# Patient Record
Sex: FEMALE | Race: WHITE | NOT HISPANIC OR LATINO | Employment: FULL TIME | URBAN - NONMETROPOLITAN AREA
[De-identification: names, ages, dates, MRNs, and addresses within clinical notes are randomized per-mention and may not be internally consistent; named-entity substitution may affect disease eponyms.]

---

## 2020-03-06 ENCOUNTER — HOSPITAL ENCOUNTER (EMERGENCY)
Facility: HOSPITAL | Age: 30
Discharge: HOME | End: 2020-03-07
Attending: STUDENT IN AN ORGANIZED HEALTH CARE EDUCATION/TRAINING PROGRAM | Admitting: STUDENT IN AN ORGANIZED HEALTH CARE EDUCATION/TRAINING PROGRAM

## 2020-03-06 ENCOUNTER — APPOINTMENT (OUTPATIENT)
Dept: ULTRASOUND IMAGING | Facility: HOSPITAL | Age: 30
End: 2020-03-06

## 2020-03-06 DIAGNOSIS — I82.411 ACUTE DEEP VEIN THROMBOSIS (DVT) OF FEMORAL VEIN OF RIGHT LOWER EXTREMITY (HCC): ICD-10-CM

## 2020-03-06 DIAGNOSIS — I26.99 ACUTE PULMONARY EMBOLISM WITHOUT ACUTE COR PULMONALE, UNSPECIFIED PULMONARY EMBOLISM TYPE (HCC): Primary | ICD-10-CM

## 2020-03-06 LAB
ALBUMIN SERPL-MCNC: 3.7 G/DL (ref 3.5–5.2)
ALBUMIN/GLOB SERPL: 1.1 G/DL
ALP SERPL-CCNC: 60 U/L (ref 39–117)
ALT SERPL W P-5'-P-CCNC: 12 U/L (ref 1–33)
ANION GAP SERPL CALCULATED.3IONS-SCNC: 12 MMOL/L (ref 5–15)
AST SERPL-CCNC: 37 U/L (ref 1–32)
BASOPHILS # BLD AUTO: 0.03 10*3/MM3 (ref 0–0.2)
BASOPHILS NFR BLD AUTO: 0.2 % (ref 0–1.5)
BILIRUB SERPL-MCNC: 0.3 MG/DL (ref 0.2–1.2)
BUN BLD-MCNC: 11 MG/DL (ref 6–20)
BUN/CREAT SERPL: 14.7 (ref 7–25)
CALCIUM SPEC-SCNC: 8.9 MG/DL (ref 8.6–10.5)
CHLORIDE SERPL-SCNC: 100 MMOL/L (ref 98–107)
CO2 SERPL-SCNC: 25 MMOL/L (ref 22–29)
CREAT BLD-MCNC: 0.75 MG/DL (ref 0.57–1)
DEPRECATED RDW RBC AUTO: 45.7 FL (ref 37–54)
EOSINOPHIL # BLD AUTO: 0.01 10*3/MM3 (ref 0–0.4)
EOSINOPHIL NFR BLD AUTO: 0.1 % (ref 0.3–6.2)
ERYTHROCYTE [DISTWIDTH] IN BLOOD BY AUTOMATED COUNT: 12.8 % (ref 12.3–15.4)
GFR SERPL CREATININE-BSD FRML MDRD: 92 ML/MIN/1.73
GLOBULIN UR ELPH-MCNC: 3.5 GM/DL
GLUCOSE BLD-MCNC: 134 MG/DL (ref 65–99)
HCT VFR BLD AUTO: 45.2 % (ref 34–46.6)
HGB BLD-MCNC: 15 G/DL (ref 12–15.9)
IMM GRANULOCYTES # BLD AUTO: 0.06 10*3/MM3 (ref 0–0.05)
IMM GRANULOCYTES NFR BLD AUTO: 0.5 % (ref 0–0.5)
LYMPHOCYTES # BLD AUTO: 1.01 10*3/MM3 (ref 0.7–3.1)
LYMPHOCYTES NFR BLD AUTO: 7.8 % (ref 19.6–45.3)
MCH RBC QN AUTO: 31.7 PG (ref 26.6–33)
MCHC RBC AUTO-ENTMCNC: 33.2 G/DL (ref 31.5–35.7)
MCV RBC AUTO: 95.6 FL (ref 79–97)
MONOCYTES # BLD AUTO: 0.84 10*3/MM3 (ref 0.1–0.9)
MONOCYTES NFR BLD AUTO: 6.4 % (ref 5–12)
NEUTROPHILS # BLD AUTO: 11.08 10*3/MM3 (ref 1.7–7)
NEUTROPHILS NFR BLD AUTO: 85 % (ref 42.7–76)
NRBC BLD AUTO-RTO: 0 /100 WBC (ref 0–0.2)
PLATELET # BLD AUTO: 278 10*3/MM3 (ref 140–450)
PMV BLD AUTO: 8.4 FL (ref 6–12)
POTASSIUM BLD-SCNC: 4.5 MMOL/L (ref 3.5–5.2)
PROT SERPL-MCNC: 7.2 G/DL (ref 6–8.5)
RBC # BLD AUTO: 4.73 10*6/MM3 (ref 3.77–5.28)
SODIUM BLD-SCNC: 137 MMOL/L (ref 136–145)
TROPONIN T SERPL-MCNC: <0.01 NG/ML (ref 0–0.03)
WBC NRBC COR # BLD: 13.03 10*3/MM3 (ref 3.4–10.8)

## 2020-03-06 PROCEDURE — 99284 EMERGENCY DEPT VISIT MOD MDM: CPT

## 2020-03-06 PROCEDURE — 85025 COMPLETE CBC W/AUTO DIFF WBC: CPT | Performed by: PHYSICIAN ASSISTANT

## 2020-03-06 PROCEDURE — 80053 COMPREHEN METABOLIC PANEL: CPT | Performed by: PHYSICIAN ASSISTANT

## 2020-03-06 PROCEDURE — 93971 EXTREMITY STUDY: CPT

## 2020-03-06 PROCEDURE — 84484 ASSAY OF TROPONIN QUANT: CPT | Performed by: PHYSICIAN ASSISTANT

## 2020-03-06 RX ORDER — SPIRONOLACTONE 100 MG/1
150 TABLET, FILM COATED ORAL DAILY
COMMUNITY
End: 2022-01-19 | Stop reason: SDUPTHER

## 2020-03-06 RX ORDER — DOXYCYCLINE 50 MG/1
200 CAPSULE ORAL 2 TIMES DAILY
Status: ON HOLD | COMMUNITY
End: 2020-03-10

## 2020-03-06 RX ORDER — SODIUM CHLORIDE 0.9 % (FLUSH) 0.9 %
10 SYRINGE (ML) INJECTION AS NEEDED
Status: DISCONTINUED | OUTPATIENT
Start: 2020-03-06 | End: 2020-03-07 | Stop reason: HOSPADM

## 2020-03-07 ENCOUNTER — APPOINTMENT (OUTPATIENT)
Dept: CT IMAGING | Facility: HOSPITAL | Age: 30
End: 2020-03-07

## 2020-03-07 VITALS
SYSTOLIC BLOOD PRESSURE: 121 MMHG | BODY MASS INDEX: 26.18 KG/M2 | DIASTOLIC BLOOD PRESSURE: 69 MMHG | RESPIRATION RATE: 18 BRPM | WEIGHT: 166.8 LBS | OXYGEN SATURATION: 97 % | TEMPERATURE: 98.8 F | HEART RATE: 97 BPM | HEIGHT: 67 IN

## 2020-03-07 PROCEDURE — 71275 CT ANGIOGRAPHY CHEST: CPT

## 2020-03-07 PROCEDURE — 25010000002 IOPAMIDOL 61 % SOLUTION: Performed by: STUDENT IN AN ORGANIZED HEALTH CARE EDUCATION/TRAINING PROGRAM

## 2020-03-07 PROCEDURE — 93005 ELECTROCARDIOGRAM TRACING: CPT | Performed by: PHYSICIAN ASSISTANT

## 2020-03-07 RX ORDER — HYDROCODONE BITARTRATE AND ACETAMINOPHEN 5; 325 MG/1; MG/1
1 TABLET ORAL ONCE
Status: COMPLETED | OUTPATIENT
Start: 2020-03-07 | End: 2020-03-07

## 2020-03-07 RX ORDER — HYDROCODONE BITARTRATE AND ACETAMINOPHEN 5; 325 MG/1; MG/1
1 TABLET ORAL EVERY 6 HOURS PRN
Qty: 20 TABLET | Refills: 0 | Status: SHIPPED | OUTPATIENT
Start: 2020-03-07 | End: 2020-07-29

## 2020-03-07 RX ADMIN — HYDROCODONE BITARTRATE AND ACETAMINOPHEN 1 TABLET: 5; 325 TABLET ORAL at 01:32

## 2020-03-07 RX ADMIN — RIVAROXABAN 20 MG: 10 TABLET, FILM COATED ORAL at 01:12

## 2020-03-07 RX ADMIN — IOPAMIDOL 100 ML: 612 INJECTION, SOLUTION INTRAVENOUS at 00:47

## 2020-03-07 NOTE — ED PROVIDER NOTES
Subjective   Is a 30-year-old female with approximately 9 to 10 days of right proximal thigh pain.  She also has some right calf tenderness.  She was seen by her PCP last Wednesday, the day after it started last Tuesday, and was told she likely strained muscle was told take NSAIDs and rest.  Patient denies any known injury.  She has no personal history of DVT or PE however her father had a DVT and she is unsure of exactly why.  She denies any chest pain.  She states she did have a fever of 102 recently but no URI symptoms or any other complaints other than the right lower extremity pain.          Review of Systems   Constitutional: Positive for fever.   HENT: Negative.    Eyes: Negative.    Respiratory: Negative.    Cardiovascular: Negative.  Negative for chest pain.   Gastrointestinal: Negative.    Genitourinary: Negative.    Musculoskeletal:        Right lower extremity pain   Skin: Negative.    Neurological: Negative.    Psychiatric/Behavioral: Negative.        History reviewed. No pertinent past medical history.    No Known Allergies    History reviewed. No pertinent surgical history.    History reviewed. No pertinent family history.    Social History     Socioeconomic History   • Marital status:      Spouse name: Not on file   • Number of children: Not on file   • Years of education: Not on file   • Highest education level: Not on file   Tobacco Use   • Smoking status: Never Smoker   Substance and Sexual Activity   • Alcohol use: Never     Frequency: Never   • Drug use: Never   • Sexual activity: Yes           Objective   Physical Exam   Constitutional: She appears well-developed and well-nourished. No distress.   HENT:   Head: Normocephalic and atraumatic.   Eyes: EOM are normal.   Neck: Normal range of motion.   Cardiovascular: Normal rate and regular rhythm.   Pulmonary/Chest: Effort normal.   Musculoskeletal: Normal range of motion.   Tenderness to the right proximal medial and lateral thigh.   Tenderness to the right calf.  Mild swelling of the right thigh compared to the left.  2+ dorsalis pedis pulse in the right.  No significant cellulitis or abscess.   Skin: Skin is warm and dry. She is not diaphoretic.   Psychiatric: She has a normal mood and affect. Her behavior is normal. Thought content normal.       Procedures           ED Course  ED Course as of Mar 07 0254   Sat Mar 07, 2020   0104 EKG shows a sinus rhythm with a rate of 95.  Nonspecific ST segments in leads III, V3, V4.  Abnormal EKG.  Interpreted by me.    [DT]      ED Course User Index  [DT] Gaudencio Guadalupe MD                                           MDM  Number of Diagnoses or Management Options  Acute deep vein thrombosis (DVT) of femoral vein of right lower extremity (CMS/HCC):   Acute pulmonary embolism without acute cor pulmonale, unspecified pulmonary embolism type (CMS/HCC):   Diagnosis management comments: CT PE is positive for PE in right lower lobe  Ultrasound shows DVT of the right common femoral vein and superficial femoral vein.  There is also thrombosis of the greater saphenous vein.  Patient's vitals are stable.  Initiated treatment with Xarelto  Given instructions to follow-up with Dr. Yan.  Also given signs and symptoms to watch for that would prompt a return.       Amount and/or Complexity of Data Reviewed  Clinical lab tests: reviewed  Discussion of test results with the performing providers: yes  Decide to obtain previous medical records or to obtain history from someone other than the patient: yes  Obtain history from someone other than the patient: yes  Review and summarize past medical records: yes  Discuss the patient with other providers: yes  Independent visualization of images, tracings, or specimens: yes        Final diagnoses:   Acute pulmonary embolism without acute cor pulmonale, unspecified pulmonary embolism type (CMS/HCC)   Acute deep vein thrombosis (DVT) of femoral vein of right lower extremity  (CMS/HCC)            Gaudencio Guadalupe MD  03/07/20 0256

## 2020-03-09 ENCOUNTER — APPOINTMENT (OUTPATIENT)
Dept: ULTRASOUND IMAGING | Facility: HOSPITAL | Age: 30
End: 2020-03-09

## 2020-03-09 ENCOUNTER — HOSPITAL ENCOUNTER (INPATIENT)
Facility: HOSPITAL | Age: 30
LOS: 2 days | Discharge: HOME | End: 2020-03-11
Attending: EMERGENCY MEDICINE | Admitting: INTERNAL MEDICINE

## 2020-03-09 DIAGNOSIS — I47.1 SUSTAINED SVT (HCC): Chronic | ICD-10-CM

## 2020-03-09 DIAGNOSIS — I82.409: ICD-10-CM

## 2020-03-09 DIAGNOSIS — I82.521 CHRONIC DEEP VEIN THROMBOSIS (DVT) OF RIGHT ILIAC VEIN (HCC): Primary | ICD-10-CM

## 2020-03-09 LAB
ALBUMIN SERPL-MCNC: 3.5 G/DL (ref 3.5–5.2)
ALBUMIN/GLOB SERPL: 0.9 G/DL
ALP SERPL-CCNC: 68 U/L (ref 39–117)
ALT SERPL W P-5'-P-CCNC: 10 U/L (ref 1–33)
ANION GAP SERPL CALCULATED.3IONS-SCNC: 12 MMOL/L (ref 5–15)
AST SERPL-CCNC: 34 U/L (ref 1–32)
B-HCG UR QL: NEGATIVE
B2 MICROGLOB SERPL-MCNC: 1.7 MG/L (ref 0.8–2.2)
BASOPHILS # BLD AUTO: 0.03 10*3/MM3 (ref 0–0.2)
BASOPHILS NFR BLD AUTO: 0.3 % (ref 0–1.5)
BILIRUB SERPL-MCNC: 0.6 MG/DL (ref 0.2–1.2)
BUN BLD-MCNC: 8 MG/DL (ref 6–20)
BUN/CREAT SERPL: 13.8 (ref 7–25)
CALCIUM SPEC-SCNC: 9.9 MG/DL (ref 8.6–10.5)
CHLORIDE SERPL-SCNC: 96 MMOL/L (ref 98–107)
CO2 SERPL-SCNC: 27 MMOL/L (ref 22–29)
CREAT BLD-MCNC: 0.58 MG/DL (ref 0.57–1)
DEPRECATED RDW RBC AUTO: 44.7 FL (ref 37–54)
EOSINOPHIL # BLD AUTO: 0.01 10*3/MM3 (ref 0–0.4)
EOSINOPHIL NFR BLD AUTO: 0.1 % (ref 0.3–6.2)
ERYTHROCYTE [DISTWIDTH] IN BLOOD BY AUTOMATED COUNT: 12.6 % (ref 12.3–15.4)
GFR SERPL CREATININE-BSD FRML MDRD: 124 ML/MIN/1.73
GLOBULIN UR ELPH-MCNC: 3.9 GM/DL
GLUCOSE BLD-MCNC: 94 MG/DL (ref 65–99)
HCT VFR BLD AUTO: 45.5 % (ref 34–46.6)
HCYS SERPL-MCNC: 4 UMOL/L (ref 0–15)
HGB BLD-MCNC: 15.3 G/DL (ref 12–15.9)
IMM GRANULOCYTES # BLD AUTO: 0.05 10*3/MM3 (ref 0–0.05)
IMM GRANULOCYTES NFR BLD AUTO: 0.4 % (ref 0–0.5)
LYMPHOCYTES # BLD AUTO: 1.29 10*3/MM3 (ref 0.7–3.1)
LYMPHOCYTES NFR BLD AUTO: 10.9 % (ref 19.6–45.3)
MCH RBC QN AUTO: 31.9 PG (ref 26.6–33)
MCHC RBC AUTO-ENTMCNC: 33.6 G/DL (ref 31.5–35.7)
MCV RBC AUTO: 95 FL (ref 79–97)
MONOCYTES # BLD AUTO: 0.81 10*3/MM3 (ref 0.1–0.9)
MONOCYTES NFR BLD AUTO: 6.9 % (ref 5–12)
NEUTROPHILS # BLD AUTO: 9.63 10*3/MM3 (ref 1.7–7)
NEUTROPHILS NFR BLD AUTO: 81.4 % (ref 42.7–76)
NRBC BLD AUTO-RTO: 0 /100 WBC (ref 0–0.2)
PLATELET # BLD AUTO: 327 10*3/MM3 (ref 140–450)
PMV BLD AUTO: 8.7 FL (ref 6–12)
POTASSIUM BLD-SCNC: 4.5 MMOL/L (ref 3.5–5.2)
PROT SERPL-MCNC: 7.4 G/DL (ref 6–8.5)
RBC # BLD AUTO: 4.79 10*6/MM3 (ref 3.77–5.28)
SODIUM BLD-SCNC: 135 MMOL/L (ref 136–145)
TSH SERPL DL<=0.05 MIU/L-ACNC: 4.07 UIU/ML (ref 0.27–4.2)
WBC NRBC COR # BLD: 11.82 10*3/MM3 (ref 3.4–10.8)

## 2020-03-09 PROCEDURE — C1876 STENT, NON-COA/NON-COV W/DEL: HCPCS | Performed by: INTERNAL MEDICINE

## 2020-03-09 PROCEDURE — 25010000002 MIDAZOLAM PER 1MG: Performed by: INTERNAL MEDICINE

## 2020-03-09 PROCEDURE — 85306 CLOT INHIBIT PROT S FREE: CPT | Performed by: INTERNAL MEDICINE

## 2020-03-09 PROCEDURE — C1757 CATH, THROMBECTOMY/EMBOLECT: HCPCS | Performed by: INTERNAL MEDICINE

## 2020-03-09 PROCEDURE — 06CM3ZZ EXTIRPATION OF MATTER FROM RIGHT FEMORAL VEIN, PERCUTANEOUS APPROACH: ICD-10-PCS | Performed by: INTERNAL MEDICINE

## 2020-03-09 PROCEDURE — 25010000002 HEPARIN (PORCINE) PER 1000 UNITS: Performed by: INTERNAL MEDICINE

## 2020-03-09 PROCEDURE — 99153 MOD SED SAME PHYS/QHP EA: CPT | Performed by: INTERNAL MEDICINE

## 2020-03-09 PROCEDURE — 25010000003 LIDOCAINE 1 % SOLUTION: Performed by: INTERNAL MEDICINE

## 2020-03-09 PROCEDURE — 25010000002 FENTANYL CITRATE (PF) 100 MCG/2ML SOLUTION: Performed by: INTERNAL MEDICINE

## 2020-03-09 PROCEDURE — 067F3DZ DILATION OF RIGHT EXTERNAL ILIAC VEIN WITH INTRALUMINAL DEVICE, PERCUTANEOUS APPROACH: ICD-10-PCS | Performed by: INTERNAL MEDICINE

## 2020-03-09 PROCEDURE — 06CF3ZZ EXTIRPATION OF MATTER FROM RIGHT EXTERNAL ILIAC VEIN, PERCUTANEOUS APPROACH: ICD-10-PCS | Performed by: INTERNAL MEDICINE

## 2020-03-09 PROCEDURE — C1760 CLOSURE DEV, VASC: HCPCS | Performed by: INTERNAL MEDICINE

## 2020-03-09 PROCEDURE — 81025 URINE PREGNANCY TEST: CPT | Performed by: INTERNAL MEDICINE

## 2020-03-09 PROCEDURE — 0 IOPAMIDOL PER 1 ML: Performed by: INTERNAL MEDICINE

## 2020-03-09 PROCEDURE — 82232 ASSAY OF BETA-2 PROTEIN: CPT | Performed by: INTERNAL MEDICINE

## 2020-03-09 PROCEDURE — C1725 CATH, TRANSLUMIN NON-LASER: HCPCS | Performed by: INTERNAL MEDICINE

## 2020-03-09 PROCEDURE — 99152 MOD SED SAME PHYS/QHP 5/>YRS: CPT | Performed by: INTERNAL MEDICINE

## 2020-03-09 PROCEDURE — 85303 CLOT INHIBIT PROT C ACTIVITY: CPT | Performed by: INTERNAL MEDICINE

## 2020-03-09 PROCEDURE — C1887 CATHETER, GUIDING: HCPCS | Performed by: INTERNAL MEDICINE

## 2020-03-09 PROCEDURE — 85300 ANTITHROMBIN III ACTIVITY: CPT | Performed by: INTERNAL MEDICINE

## 2020-03-09 PROCEDURE — 81241 F5 GENE: CPT | Performed by: INTERNAL MEDICINE

## 2020-03-09 PROCEDURE — 37252 INTRVASC US NONCORONARY 1ST: CPT | Performed by: INTERNAL MEDICINE

## 2020-03-09 PROCEDURE — B51B0ZZ FLUOROSCOPY OF RIGHT LOWER EXTREMITY VEINS USING HIGH OSMOLAR CONTRAST: ICD-10-PCS | Performed by: INTERNAL MEDICINE

## 2020-03-09 PROCEDURE — 99284 EMERGENCY DEPT VISIT MOD MDM: CPT

## 2020-03-09 PROCEDURE — 75820 VEIN X-RAY ARM/LEG: CPT | Performed by: INTERNAL MEDICINE

## 2020-03-09 PROCEDURE — C1894 INTRO/SHEATH, NON-LASER: HCPCS | Performed by: INTERNAL MEDICINE

## 2020-03-09 PROCEDURE — 93971 EXTREMITY STUDY: CPT

## 2020-03-09 PROCEDURE — C1769 GUIDE WIRE: HCPCS | Performed by: INTERNAL MEDICINE

## 2020-03-09 PROCEDURE — 85025 COMPLETE CBC W/AUTO DIFF WBC: CPT | Performed by: INTERNAL MEDICINE

## 2020-03-09 PROCEDURE — 83090 ASSAY OF HOMOCYSTEINE: CPT | Performed by: INTERNAL MEDICINE

## 2020-03-09 PROCEDURE — C1753 CATH, INTRAVAS ULTRASOUND: HCPCS | Performed by: INTERNAL MEDICINE

## 2020-03-09 PROCEDURE — 80053 COMPREHEN METABOLIC PANEL: CPT | Performed by: INTERNAL MEDICINE

## 2020-03-09 PROCEDURE — 067C3DZ DILATION OF RIGHT COMMON ILIAC VEIN WITH INTRALUMINAL DEVICE, PERCUTANEOUS APPROACH: ICD-10-PCS | Performed by: INTERNAL MEDICINE

## 2020-03-09 PROCEDURE — 84443 ASSAY THYROID STIM HORMONE: CPT | Performed by: INTERNAL MEDICINE

## 2020-03-09 PROCEDURE — 06CC3ZZ EXTIRPATION OF MATTER FROM RIGHT COMMON ILIAC VEIN, PERCUTANEOUS APPROACH: ICD-10-PCS | Performed by: INTERNAL MEDICINE

## 2020-03-09 PROCEDURE — 76937 US GUIDE VASCULAR ACCESS: CPT

## 2020-03-09 DEVICE — IMPLANTABLE DEVICE: Type: IMPLANTABLE DEVICE | Status: FUNCTIONAL

## 2020-03-09 RX ORDER — LIDOCAINE HYDROCHLORIDE 10 MG/ML
INJECTION, SOLUTION INFILTRATION; PERINEURAL AS NEEDED
Status: DISCONTINUED | OUTPATIENT
Start: 2020-03-09 | End: 2020-03-09 | Stop reason: HOSPADM

## 2020-03-09 RX ORDER — ONDANSETRON 2 MG/ML
4 INJECTION INTRAMUSCULAR; INTRAVENOUS EVERY 6 HOURS PRN
Status: DISCONTINUED | OUTPATIENT
Start: 2020-03-09 | End: 2020-03-11 | Stop reason: HOSPADM

## 2020-03-09 RX ORDER — CLOPIDOGREL BISULFATE 75 MG/1
TABLET ORAL AS NEEDED
Status: DISCONTINUED | OUTPATIENT
Start: 2020-03-09 | End: 2020-03-09 | Stop reason: HOSPADM

## 2020-03-09 RX ORDER — ONDANSETRON 4 MG/1
4 TABLET, FILM COATED ORAL EVERY 6 HOURS PRN
Status: DISCONTINUED | OUTPATIENT
Start: 2020-03-09 | End: 2020-03-11 | Stop reason: HOSPADM

## 2020-03-09 RX ORDER — MIDAZOLAM HYDROCHLORIDE 2 MG/2ML
INJECTION, SOLUTION INTRAMUSCULAR; INTRAVENOUS AS NEEDED
Status: DISCONTINUED | OUTPATIENT
Start: 2020-03-09 | End: 2020-03-09 | Stop reason: HOSPADM

## 2020-03-09 RX ORDER — HEPARIN SODIUM 1000 [USP'U]/ML
INJECTION, SOLUTION INTRAVENOUS; SUBCUTANEOUS AS NEEDED
Status: DISCONTINUED | OUTPATIENT
Start: 2020-03-09 | End: 2020-03-09 | Stop reason: HOSPADM

## 2020-03-09 RX ORDER — SODIUM CHLORIDE 9 MG/ML
100 INJECTION, SOLUTION INTRAVENOUS CONTINUOUS
Status: DISCONTINUED | OUTPATIENT
Start: 2020-03-09 | End: 2020-03-10

## 2020-03-09 RX ORDER — ACETAMINOPHEN 325 MG/1
650 TABLET ORAL EVERY 4 HOURS PRN
Status: DISCONTINUED | OUTPATIENT
Start: 2020-03-09 | End: 2020-03-11 | Stop reason: HOSPADM

## 2020-03-09 RX ORDER — SODIUM CHLORIDE 9 MG/ML
100 INJECTION, SOLUTION INTRAVENOUS CONTINUOUS
Status: DISCONTINUED | OUTPATIENT
Start: 2020-03-09 | End: 2020-03-09

## 2020-03-09 RX ORDER — TRETINOIN 0.25 MG/G
GEL TOPICAL NIGHTLY
COMMUNITY
End: 2022-05-09 | Stop reason: SDUPTHER

## 2020-03-09 RX ORDER — HYDROCODONE BITARTRATE AND ACETAMINOPHEN 5; 325 MG/1; MG/1
1 TABLET ORAL EVERY 4 HOURS PRN
Status: DISCONTINUED | OUTPATIENT
Start: 2020-03-09 | End: 2020-03-10 | Stop reason: SDUPTHER

## 2020-03-09 RX ORDER — HYDROCODONE BITARTRATE AND ACETAMINOPHEN 5; 325 MG/1; MG/1
1 TABLET ORAL EVERY 4 HOURS PRN
Status: DISCONTINUED | OUTPATIENT
Start: 2020-03-09 | End: 2020-03-11 | Stop reason: HOSPADM

## 2020-03-09 RX ORDER — MINOCYCLINE HYDROCHLORIDE 100 MG/1
100 CAPSULE ORAL 2 TIMES DAILY
COMMUNITY
End: 2022-01-19 | Stop reason: SDUPTHER

## 2020-03-09 RX ORDER — CLOPIDOGREL BISULFATE 75 MG/1
75 TABLET ORAL DAILY
Status: DISCONTINUED | OUTPATIENT
Start: 2020-03-10 | End: 2020-03-11 | Stop reason: HOSPADM

## 2020-03-09 RX ORDER — FENTANYL CITRATE 50 UG/ML
INJECTION, SOLUTION INTRAMUSCULAR; INTRAVENOUS AS NEEDED
Status: DISCONTINUED | OUTPATIENT
Start: 2020-03-09 | End: 2020-03-09 | Stop reason: HOSPADM

## 2020-03-09 RX ADMIN — SODIUM CHLORIDE 100 ML/HR: 9 INJECTION, SOLUTION INTRAVENOUS at 20:33

## 2020-03-09 RX ADMIN — RIVAROXABAN 15 MG: 15 TABLET, FILM COATED ORAL at 20:44

## 2020-03-09 RX ADMIN — HYDROCODONE BITARTRATE AND ACETAMINOPHEN 1 TABLET: 5; 325 TABLET ORAL at 20:09

## 2020-03-09 RX ADMIN — HYDROCODONE BITARTRATE AND ACETAMINOPHEN 1 TABLET: 5; 325 TABLET ORAL at 15:54

## 2020-03-09 NOTE — H&P
Dayana Pat MD      Patient Care Team:  Dayana Pat MD as PCP - General (Family Medicine)      History of present illness:     Middle-aged lady who came in to the emergency room couple days ago with complaints of pain in the leg pain was noted to have a deep venous thrombosis involving the common femoral vein up to the popliteal vein.  Was restarted on Xarelto 20 mg daily.  Now comes back in with severe excruciating pain in the right groin with worsening swelling of the lower extremity.  Unable to stand any duration of time because of heaviness in the leg.  Repeat Doppler reveals extension of the thrombus and into the external iliac vein.    Patient thinks about couple of weeks ago the symptoms started occurring it was more so in the groin area.  There is no history of travel there is no history of trauma has started a new oral contraceptive pill about 6 months ago.  Has not had any surgery or has not been laid up for any long periods of time at this time.    Review of Systems   Pertinent items are noted in HPI  Review of Systems      History    Baseline EKG: Sinus rhythm GA interval of 126 ms rate of 96 beats a minute nonspecific ST wave changes.    LV function assessment: Pending.    CAD work-up none.    Deep venous thrombosis diagnosed 3/20.    Pulmonary embolism: Right lower lobe pulmonary embolism 3/20.    Anticoagulation therapy.    Personal history:    Non-smoker does not drink alcohol does not abuse drugs functional status the patient has been good.  He is active and manages a nonprofit organization.    Review of symptoms:    There is no cough cold fever chills nausea vomiting diarrhea abdominal pain loss of consciousness PND orthopnea stroke weakness joint swelling rest of review symptoms are negative.    Family history:    Patient's father has had clot in the lungs.  No other family member has had any excessive clotting problems.        History reviewed. No pertinent surgical  "history., History reviewed. No pertinent family history., Social History     Tobacco Use   • Smoking status: Never Smoker   Substance Use Topics   • Alcohol use: Never     Frequency: Never   • Drug use: Never   ,   (Not in a hospital admission), Scheduled Meds:   , Continuous Infusions:    sodium chloride 100 mL/hr   , PRN Meds:  •  HYDROcodone-acetaminophen  •  ondansetron, Allergies:  Patient has no known allergies.     OBJECTIVE:    Vital Sign Min/Max for last 24 hours  Temp  Min: 98.4 °F (36.9 °C)  Max: 98.4 °F (36.9 °C)   BP  Min: 127/72  Max: 127/72   Pulse  Min: 104  Max: 104   Resp  Min: 16  Max: 16   SpO2  Min: 100 %  Max: 100 %   No data recorded   Weight  Min: 72.6 kg (160 lb)  Max: 72.6 kg (160 lb)     Flowsheet Rows      First Filed Value   Admission Height  170.2 cm (67\") Documented at 03/09/2020 1134   Admission Weight  72.6 kg (160 lb) Documented at 03/09/2020 1134               Physical Exam:     General Appearance:    Alert, cooperative, in no acute distress   Head:    Normocephalic, without obvious abnormality, atraumatic   Eyes:            Lids and lashes normal, conjunctivae and sclerae normal, no   icterus, no pallor, corneas clear, PERRLA   Ears:    Ears appear intact with no abnormalities noted   Throat:   No oral lesions, no thrush, oral mucosa moist   Neck:   No adenopathy, supple, trachea midline, no thyromegaly, no     carotid bruit, no JVD   Back:     No kyphosis present, no scoliosis present, no skin lesions,       erythema or scars, no tenderness to percussion or                   palpation,   range of motion normal   Lungs:     Clear to auscultation,respirations regular, even and                   unlabored    Heart:    Regular rhythm and normal rate, normal S1 and S2, no            murmur, no gallop, no rub, no click   Breast Exam:    Deferred   Abdomen:     Normal bowel sounds, no masses, no organomegaly, soft        non-tender, non-distended, no guarding, no rebound                 " tenderness   Genitalia:    Deferred   Extremities:  Right leg is 6 cm bigger than the left leg at the thigh and 3 cm bigger at the calf.  Exquisite tenderness present.  Visible veins all over.   Pulses:   Pulses palpable and equal bilaterally   Skin:   No bleeding, bruising or rash   Lymph nodes:   No palpable adenopathy   Neurologic:   Cranial nerves 2 - 12 grossly intact, sensation intact, DTR        present and equal bilaterally         LAB DATA :           WBC   Date Value Ref Range Status   03/06/2020 13.03 (H) 3.40 - 10.80 10*3/mm3 Final     RBC   Date Value Ref Range Status   03/06/2020 4.73 3.77 - 5.28 10*6/mm3 Final     Hemoglobin   Date Value Ref Range Status   03/06/2020 15.0 12.0 - 15.9 g/dL Final     Hematocrit   Date Value Ref Range Status   03/06/2020 45.2 34.0 - 46.6 % Final     MCV   Date Value Ref Range Status   03/06/2020 95.6 79.0 - 97.0 fL Final     MCH   Date Value Ref Range Status   03/06/2020 31.7 26.6 - 33.0 pg Final     MCHC   Date Value Ref Range Status   03/06/2020 33.2 31.5 - 35.7 g/dL Final     RDW   Date Value Ref Range Status   03/06/2020 12.8 12.3 - 15.4 % Final     RDW-SD   Date Value Ref Range Status   03/06/2020 45.7 37.0 - 54.0 fl Final     MPV   Date Value Ref Range Status   03/06/2020 8.4 6.0 - 12.0 fL Final     Platelets   Date Value Ref Range Status   03/06/2020 278 140 - 450 10*3/mm3 Final     Neutrophil %   Date Value Ref Range Status   03/06/2020 85.0 (H) 42.7 - 76.0 % Final     Lymphocyte %   Date Value Ref Range Status   03/06/2020 7.8 (L) 19.6 - 45.3 % Final     Monocyte %   Date Value Ref Range Status   03/06/2020 6.4 5.0 - 12.0 % Final     Eosinophil %   Date Value Ref Range Status   03/06/2020 0.1 (L) 0.3 - 6.2 % Final     Basophil %   Date Value Ref Range Status   03/06/2020 0.2 0.0 - 1.5 % Final     Immature Grans %   Date Value Ref Range Status   03/06/2020 0.5 0.0 - 0.5 % Final     Neutrophils, Absolute   Date Value Ref Range Status   03/06/2020 11.08 (H) 1.70 -  7.00 10*3/mm3 Final     Lymphocytes, Absolute   Date Value Ref Range Status   03/06/2020 1.01 0.70 - 3.10 10*3/mm3 Final     Monocytes, Absolute   Date Value Ref Range Status   03/06/2020 0.84 0.10 - 0.90 10*3/mm3 Final     Eosinophils, Absolute   Date Value Ref Range Status   03/06/2020 0.01 0.00 - 0.40 10*3/mm3 Final     Basophils, Absolute   Date Value Ref Range Status   03/06/2020 0.03 0.00 - 0.20 10*3/mm3 Final     Immature Grans, Absolute   Date Value Ref Range Status   03/06/2020 0.06 (H) 0.00 - 0.05 10*3/mm3 Final     nRBC   Date Value Ref Range Status   03/06/2020 0.0 0.0 - 0.2 /100 WBC Final       Lab Results   Component Value Date    GLUCOSE 134 (H) 03/06/2020    BUN 11 03/06/2020    CREATININE 0.75 03/06/2020    EGFRIFNONA 92 03/06/2020    BCR 14.7 03/06/2020    CO2 25.0 03/06/2020    CALCIUM 8.9 03/06/2020    ALBUMIN 3.70 03/06/2020    AST 37 (H) 03/06/2020    ALT 12 03/06/2020       Lab Results   Component Value Date    TROPONINT <0.010 03/06/2020       No results found for: DDIMER    No results found for: SITE, ALLENTEST, PHART, TKH0JCA, PO2ART, SZK3REL, BASEEXCESS, R6QEGELD, HGBBG, HCTABG, OXYHEMOGLOBI, METHHGBN, CARBOXYHGB, CO2CT, BAROMETRIC, MODALITY, FIO2  No results found for: HGBA1C      No results found for: LIPASE    IMAGING DATA:     Ct Chest Pulmonary Embolism    Result Date: 3/7/2020  Narrative: PROCEDURE: CT CHEST PULMONARY EMBOLISM-  HISTORY: soa, DVT; I26.99-Other pulmonary embolism without acute cor pulmonale; I82.411-Acute embolism and thrombosis of right femoral vein   COMPARISON: None  FINDINGS: Thin section axial CT images of the chest were obtained with contrast. 3D reformatted images were also obtained. This study was performed with techniques to keep radiation doses as low as reasonably achievable, (ALARA). Individualized dose reduction techniques using automated exposure control or adjustment of mA and/or kV according to the patient size were employed.  Pulmonary embolism is  seen involving the proximal right descending pulmonary artery. No other pulmonary embolism is identified. There is no evidence of thoracic aortic aneurysm or dissection. There is no evidence of mediastinal or hilar mass or adenopathy.  There is no evidence of pulmonary mass or suspicious nodule. A small opacity is seen in the posterior right lower lobe consistent with atelectasis or a small infarct.  Limited images of the upper abdomen  are unremarkable.       Impression:  Right lower lobe pulmonary embolism.  Mild right lower lobe atelectasis or pulmonary infarct.  No mass or localized inflammatory process.    This report was finalized on 3/7/2020 5:48 AM by Abhishek Navarrete MD.    Us Venous Doppler Lower Extremity Right (duplex)    Result Date: 3/9/2020  Narrative: PROCEDURE: US VENOUS DOPPLER LOWER EXTREMITY RIGHT (DUPLEX)-  HISTORY: increased pain with recent DVT  PROCEDURE: Multiple transverse and longitudinal scans were performed of the femoropopliteal deep venous system, with augmentation and compression maneuvers.  FINDINGS: There has been interval progression of the patient's deep venous thrombosis which currently involves the right external iliac through the popliteal veins. The calf veins are patent.  No abnormal venous collaterals are seen.      Impression: Interval progression of the patient's deep venous thrombosis which now involves the external iliac through the popliteal veins.  This report was finalized on 3/9/2020 2:59 PM by Isabel Duran M.D..    Us Venous Doppler Lower Extremity Right (duplex)    Result Date: 3/7/2020  Narrative: PROCEDURE: US VENOUS DOPPLER LOWER EXTREMITY RIGHT (DUPLEX)-  HISTORY: pain, swelling . FINDINGS: Sonographic evaluation of the right lower extremity deep venous system was performed. Compression sonography, color Doppler and duplex Doppler was performed. There is deep venous thrombosis involving the right common and superficial femoral veins. Also noted is  thrombosis of the right greater saphenous vein.      Impression: Right lower extremity deep venous thrombosis and thrombosis of the right greater saphenous vein.     This report was finalized on 3/7/2020 5:45 AM by Abhishek Navarrete MD.      ASSESSMENT PLAN:    DIAGNOSIS     DVT   Large right lower extremity DVT with local adverse effects as well as pain in the right lower extremity with swelling.  Given her age she is a high risk candidate for post thrombotic syndrome.  Option of invasive evaluation with thrombectomy was discussed she is open to this idea.  Will plan on the same for later today.      Hypercoagulable status   Will need work-up for the same.  Blood work being sent in to assess for the same.  It would be in her best interest to stay off the oral contraceptive pills.      Anticoagulation therapy   Need to be on therapeutic dose of Xarelto at least for about 3 weeks time after which bleed could be down titrated to the long-term maintenance dose.    Possibility of bleeding infection as the adverse effects of the procedure always have been discussed she expresses understanding.      Chronic deep vein thrombosis (DVT) of right iliac vein (CMS/HCC)          I discussed the patients findings and my recommendations with patient    Margarito Yan MD  03/09/20  3:47 PM

## 2020-03-09 NOTE — ED NOTES
At this time,  was contacted and transferred per .     Mayuri, [facility]  03/09/20 3220

## 2020-03-09 NOTE — ED PROVIDER NOTES
Subjective   History of Present Illness    Chief Complaint: Leg pain  History of Present Illness: 30-year-old female recent diagnosis of DVT and PE, discharged with Xarelto, having increasing pain and feeling of swelling and tightness to her right upper thigh.  Father has a history of DVT  Onset: 2 days ago  Duration: Persistent  Exacerbating / Alleviating factors: Attempted ambulation  Associated symptoms: None      Nurses Notes reviewed and agree, including vitals, allergies, social history and prior medical history.     REVIEW OF SYSTEMS: All systems reviewed and not pertinent unless noted.    Positive for: Right groin pain, some pleuritic chest pain    Negative for: Syncope hemoptysis  Review of Systems    Past Medical History:   Diagnosis Date   • Family history of blood clots        No Known Allergies    History reviewed. No pertinent surgical history.    History reviewed. No pertinent family history.    Social History     Socioeconomic History   • Marital status:      Spouse name: Not on file   • Number of children: Not on file   • Years of education: Not on file   • Highest education level: Not on file   Tobacco Use   • Smoking status: Never Smoker   Substance and Sexual Activity   • Alcohol use: Never     Frequency: Never   • Drug use: Never   • Sexual activity: Yes           Objective   Physical Exam      GENERAL APPEARANCE: Well developed, well nourished, in no acute distress.  VITAL SIGNS: per nursing, reviewed and noted  SKIN: no rashes, ulcerations or petechiae.  Tenderness palpation of the right lower extremity along the anterior thigh.  Soft compartments.  Some mild soft tissue swelling compared to left lower extremity.  Good distal pulses good cap refill.  Head: Normocephalic, atraumatic.   EYES: perrla. EOMI.  ENT:  TM clear, posterior pharynx patent.  LUNGS:  normal breath sounds. No retractions.   CARDIOVASCULAR:  regular rate and rhythm, no murmurs.  Good Peripheral pulses.  Good cap  refill.  ABDOMEN: Soft, nontender, normal bowel sounds. No hernia. No ascites.  MUSCULOSKELETAL: Tenderness palpation. Full ROM. Strength and tone normal.  NEUROLOGIC: Alert, oriented x 3. No gross deficits.   NECK: Supple, symmetric. No tenderness, no masses. Full ROM  Back: full rom, no paraspinal spasm. No CVA tenderness.   PSYCH: appropriate affect, no suicidal or homicidal ideation.  : no bladder tenderness or distention, no CVA tenderness      Procedures     No attending provider procedures were performed      ED Course  ED Course as of Mar 09 1615   Mon Mar 09, 2020   1503 PROCEDURE: US VENOUS DOPPLER LOWER EXTREMITY RIGHT (DUPLEX)-     HISTORY: increased pain with recent DVT     PROCEDURE: Multiple transverse and longitudinal scans were performed of  the femoropopliteal deep venous system, with augmentation and  compression maneuvers.     FINDINGS: There has been interval progression of the patient's deep  venous thrombosis which currently involves the right external iliac  through the popliteal veins. The calf veins are patent.  No abnormal  venous collaterals are seen.     IMPRESSION:  Interval progression of the patient's deep venous thrombosis  which now involves the external iliac through the popliteal veins.       [PF]      ED Course User Index  [PF] Darien Dominguez DO         Discussed patient's case with Dr. Yan, recommended repeat ultrasound, will evaluate patient in ER.                                  MDM  4:15 PM  Ultrasound reveals interval progression of the patient's DVT which now involves the external iliac through the popliteal veins, with initial ultrasound showing DVT of the right common and superficial femoral veins, and greater saphenous vein thrombosis.   Dr. Yan will take the patient to the Cath Lab.  Patient aware  Final diagnoses:   Progression of deep vein thrombosis (DVT) (CMS/[facility])            Darien Dominguez DO  03/09/20 1616

## 2020-03-10 LAB
ANION GAP SERPL CALCULATED.3IONS-SCNC: 14.1 MMOL/L (ref 5–15)
BASOPHILS # BLD AUTO: 0.02 10*3/MM3 (ref 0–0.2)
BASOPHILS NFR BLD AUTO: 0.2 % (ref 0–1.5)
BUN BLD-MCNC: 8 MG/DL (ref 6–20)
BUN/CREAT SERPL: 15.7 (ref 7–25)
CALCIUM SPEC-SCNC: 8.9 MG/DL (ref 8.6–10.5)
CHLORIDE SERPL-SCNC: 100 MMOL/L (ref 98–107)
CO2 SERPL-SCNC: 23.9 MMOL/L (ref 22–29)
CREAT BLD-MCNC: 0.51 MG/DL (ref 0.57–1)
DEPRECATED RDW RBC AUTO: 43.3 FL (ref 37–54)
EOSINOPHIL # BLD AUTO: 0 10*3/MM3 (ref 0–0.4)
EOSINOPHIL NFR BLD AUTO: 0 % (ref 0.3–6.2)
ERYTHROCYTE [DISTWIDTH] IN BLOOD BY AUTOMATED COUNT: 12.4 % (ref 12.3–15.4)
F5 GENE MUT ANL BLD/T: NORMAL
GFR SERPL CREATININE-BSD FRML MDRD: 144 ML/MIN/1.73
GLUCOSE BLD-MCNC: 95 MG/DL (ref 65–99)
HCT VFR BLD AUTO: 41 % (ref 34–46.6)
HGB BLD-MCNC: 13.8 G/DL (ref 12–15.9)
IMM GRANULOCYTES # BLD AUTO: 0.06 10*3/MM3 (ref 0–0.05)
IMM GRANULOCYTES NFR BLD AUTO: 0.6 % (ref 0–0.5)
LYMPHOCYTES # BLD AUTO: 1.37 10*3/MM3 (ref 0.7–3.1)
LYMPHOCYTES NFR BLD AUTO: 14 % (ref 19.6–45.3)
MCH RBC QN AUTO: 31.5 PG (ref 26.6–33)
MCHC RBC AUTO-ENTMCNC: 33.7 G/DL (ref 31.5–35.7)
MCV RBC AUTO: 93.6 FL (ref 79–97)
MONOCYTES # BLD AUTO: 0.76 10*3/MM3 (ref 0.1–0.9)
MONOCYTES NFR BLD AUTO: 7.8 % (ref 5–12)
NEUTROPHILS # BLD AUTO: 7.57 10*3/MM3 (ref 1.7–7)
NEUTROPHILS NFR BLD AUTO: 77.4 % (ref 42.7–76)
NRBC BLD AUTO-RTO: 0 /100 WBC (ref 0–0.2)
PLATELET # BLD AUTO: 317 10*3/MM3 (ref 140–450)
PMV BLD AUTO: 8.7 FL (ref 6–12)
POTASSIUM BLD-SCNC: 4 MMOL/L (ref 3.5–5.2)
RBC # BLD AUTO: 4.38 10*6/MM3 (ref 3.77–5.28)
SODIUM BLD-SCNC: 138 MMOL/L (ref 136–145)
WBC NRBC COR # BLD: 9.78 10*3/MM3 (ref 3.4–10.8)

## 2020-03-10 PROCEDURE — 85025 COMPLETE CBC W/AUTO DIFF WBC: CPT | Performed by: INTERNAL MEDICINE

## 2020-03-10 PROCEDURE — 80048 BASIC METABOLIC PNL TOTAL CA: CPT | Performed by: INTERNAL MEDICINE

## 2020-03-10 RX ORDER — METOPROLOL SUCCINATE 50 MG/1
50 TABLET, EXTENDED RELEASE ORAL
Status: DISCONTINUED | OUTPATIENT
Start: 2020-03-10 | End: 2020-03-11 | Stop reason: HOSPADM

## 2020-03-10 RX ADMIN — HYDROCODONE BITARTRATE AND ACETAMINOPHEN 1 TABLET: 5; 325 TABLET ORAL at 18:37

## 2020-03-10 RX ADMIN — METOPROLOL SUCCINATE 50 MG: 50 TABLET, EXTENDED RELEASE ORAL at 20:27

## 2020-03-10 RX ADMIN — HYDROCODONE BITARTRATE AND ACETAMINOPHEN 1 TABLET: 5; 325 TABLET ORAL at 23:38

## 2020-03-10 RX ADMIN — CLOPIDOGREL BISULFATE 75 MG: 75 TABLET ORAL at 08:39

## 2020-03-10 RX ADMIN — HYDROCODONE BITARTRATE AND ACETAMINOPHEN 1 TABLET: 5; 325 TABLET ORAL at 06:47

## 2020-03-10 RX ADMIN — RIVAROXABAN 15 MG: 15 TABLET, FILM COATED ORAL at 08:39

## 2020-03-10 RX ADMIN — RIVAROXABAN 15 MG: 15 TABLET, FILM COATED ORAL at 18:37

## 2020-03-10 NOTE — PROGRESS NOTES
Continued Stay Note   Khan     Patient Name: Christine Black  MRN: [id number]  Today's Date: 3/10/2020    Admit Date: 3/9/2020    Discharge Plan     Row Name 03/10/20 1108       Plan    Plan Spoke to pt in room.  Permisson granted to speak in front of visitors.  Has no POA or LW.  Lives alone in an apartment.  Independent with ADLS.  Has no medical equipment.  Plans to go home at discharge.  Has transportation. Denies DC needs.  Cm will continue to follow.          Discharge Codes    No documentation.       Expected Discharge Date and Time     Expected Discharge Date Expected Discharge Time    Mar 11, 2020             Leah Vega RN

## 2020-03-10 NOTE — PROGRESS NOTES
"   LOS: 1 day   Patient Care Team:  Dayana Pat MD as PCP - General (Family Medicine)      Interval History:     Patient seems to be doing reasonably well.  Has some pain in the back.  Has some tenderness behind the knee.  Review of Systems:   Pertinent items are noted in HPI.      OBJECTIVE:    Vital Sign Min/Max for last 24 hours  Temp  Min: 98.3 °F (36.8 °C)  Max: 98.9 °F (37.2 °C)   BP  Min: 108/62  Max: 131/74   Pulse  Min: 88  Max: 109   Resp  Min: 16  Max: 18   SpO2  Min: 97 %  Max: 100 %   No data recorded   No data recorded     Flowsheet Rows      First Filed Value   Admission Height  170.2 cm (67\") Documented at 03/09/2020 1134   Admission Weight  72.6 kg (160 lb) Documented at 03/09/2020 1134          Physical Exam:     General Appearance:    Alert, cooperative, in no acute distress   Head:    Normocephalic, without obvious abnormality, atraumatic   Eyes:            Lids and lashes normal, conjunctivae and sclerae normal, no   icterus, no pallor, corneas clear, PERRLA   Ears:    Ears appear intact with no abnormalities noted   Throat:   No oral lesions, no thrush, oral mucosa moist   Neck:   No adenopathy, supple, trachea midline, no thyromegaly, no     carotid bruit, no JVD   Back:     No kyphosis present, no scoliosis present, no skin lesions,       erythema or scars, no tenderness to percussion or                   palpation,   range of motion normal   Lungs:     Clear to auscultation,respirations regular, even and                   unlabored    Heart:    Regular rhythm and normal rate, normal S1 and S2, no            murmur, no gallop, no rub, no click   Breast Exam:    Deferred   Abdomen:     Normal bowel sounds, no masses, no organomegaly, soft        non-tender, non-distended, no guarding, no rebound                 tenderness   Genitalia:    Deferred   Extremities:   Moves all extremities well, no edema, no cyanosis, no              redness   Pulses:   Pulses palpable and equal " bilaterally   Skin:   No bleeding, bruising or rash   Lymph nodes:   No palpable adenopathy   Neurologic:   Cranial nerves 2 - 12 grossly intact, sensation intact, DTR        present and equal bilaterally       LAB DATA :           Laboratory results:    Results from last 7 days   Lab Units 03/10/20  0540 03/09/20  1546 03/06/20  2306   SODIUM mmol/L 138 135* 137   POTASSIUM mmol/L 4.0 4.5 4.5   CHLORIDE mmol/L 100 96* 100   CO2 mmol/L 23.9 27.0 25.0   BUN mg/dL 8 8 11   CREATININE mg/dL 0.51* 0.58 0.75   CALCIUM mg/dL 8.9 9.9 8.9   BILIRUBIN mg/dL  --  0.6 0.3   ALK PHOS U/L  --  68 60   ALT (SGPT) U/L  --  10 12   AST (SGOT) U/L  --  34* 37*   GLUCOSE mg/dL 95 94 134*     Results from last 7 days   Lab Units 03/10/20  0540 03/09/20  1546 03/06/20  2306   WBC 10*3/mm3 9.78 11.82* 13.03*   HEMOGLOBIN g/dL 13.8 15.3 15.0   HEMATOCRIT % 41.0 45.5 45.2   PLATELETS 10*3/mm3 317 327 278                 Results from last 7 days   Lab Units 03/06/20  2306   TROPONIN T ng/mL <0.010                 No results found for: HGBA1C  Results from last 7 days   Lab Units 03/09/20  1546   TSH uIU/mL 4.070           IMAGING DATA:     Us Guided Vascular Access    Result Date: 3/9/2020  Narrative: This procedure was auto-finalized with no dictation required.    Ct Chest Pulmonary Embolism    Result Date: 3/7/2020  Narrative: PROCEDURE: CT CHEST PULMONARY EMBOLISM-  HISTORY: soa, DVT; I26.99-Other pulmonary embolism without acute cor pulmonale; I82.411-Acute embolism and thrombosis of right femoral vein   COMPARISON: None  FINDINGS: Thin section axial CT images of the chest were obtained with contrast. 3D reformatted images were also obtained. This study was performed with techniques to keep radiation doses as low as reasonably achievable, (ALARA). Individualized dose reduction techniques using automated exposure control or adjustment of mA and/or kV according to the patient size were employed.  Pulmonary embolism is seen involving the  proximal right descending pulmonary artery. No other pulmonary embolism is identified. There is no evidence of thoracic aortic aneurysm or dissection. There is no evidence of mediastinal or hilar mass or adenopathy.  There is no evidence of pulmonary mass or suspicious nodule. A small opacity is seen in the posterior right lower lobe consistent with atelectasis or a small infarct.  Limited images of the upper abdomen  are unremarkable.       Impression:  Right lower lobe pulmonary embolism.  Mild right lower lobe atelectasis or pulmonary infarct.  No mass or localized inflammatory process.    This report was finalized on 3/7/2020 5:48 AM by Abhishek Navarrete MD.    Us Venous Doppler Lower Extremity Right (duplex)    Result Date: 3/9/2020  Narrative: PROCEDURE: US VENOUS DOPPLER LOWER EXTREMITY RIGHT (DUPLEX)-  HISTORY: increased pain with recent DVT  PROCEDURE: Multiple transverse and longitudinal scans were performed of the femoropopliteal deep venous system, with augmentation and compression maneuvers.  FINDINGS: There has been interval progression of the patient's deep venous thrombosis which currently involves the right external iliac through the popliteal veins. The calf veins are patent.  No abnormal venous collaterals are seen.      Impression: Interval progression of the patient's deep venous thrombosis which now involves the external iliac through the popliteal veins.  This report was finalized on 3/9/2020 2:59 PM by Isabel Duran M.D..    Us Venous Doppler Lower Extremity Right (duplex)    Result Date: 3/7/2020  Narrative: PROCEDURE: US VENOUS DOPPLER LOWER EXTREMITY RIGHT (DUPLEX)-  HISTORY: pain, swelling . FINDINGS: Sonographic evaluation of the right lower extremity deep venous system was performed. Compression sonography, color Doppler and duplex Doppler was performed. There is deep venous thrombosis involving the right common and superficial femoral veins. Also noted is thrombosis of the right  greater saphenous vein.      Impression: Right lower extremity deep venous thrombosis and thrombosis of the right greater saphenous vein.     This report was finalized on 3/7/2020 5:45 AM by Abhishek Navarrete MD.            ASSESSMENT PLAN:  #1 deep venous thrombosis:  Large right lower extremity deep venous thrombosis status post mechanical thrombectomy with intervention to the iliac and the femoral veins on the right side.  Has done well.  Needs to stay therapeutic dose of anticoagulation along with antiplatelet therapy.    2.  Tachycardia:  Patient noted to be tachycardic at rest.  Saturations are 100% on room air.  Will start her on low-dose beta-blocker therapy at this time.    3.  Hypercoagulable status  Work-up up till now has been negative she has been asked to avoid oral contraceptive medications for now.    4.  Anticoagulation therapy:  Xarelto twice daily followed by once a daily.    Tentative plan of discharge in a.m.      Chronic deep vein thrombosis (DVT) of right iliac vein (CMS/HCC)    Progression of deep vein thrombosis (DVT) (CMS/HCC)            Margarito Yan MD  03/10/20  18:52

## 2020-03-10 NOTE — PLAN OF CARE
Problem: Patient Care Overview  Goal: Plan of Care Review  Outcome: Ongoing (interventions implemented as appropriate)  Flowsheets (Taken 3/10/2020 8226)  Progress: no change  Plan of Care Reviewed With: patient  Outcome Summary: No acute events overnight. Patient has rested well. Continue to monitor.

## 2020-03-11 VITALS
OXYGEN SATURATION: 96 % | HEART RATE: 98 BPM | TEMPERATURE: 98.5 F | SYSTOLIC BLOOD PRESSURE: 123 MMHG | HEIGHT: 67 IN | BODY MASS INDEX: 25.11 KG/M2 | RESPIRATION RATE: 18 BRPM | DIASTOLIC BLOOD PRESSURE: 69 MMHG | WEIGHT: 160 LBS

## 2020-03-11 PROBLEM — I47.1 SUSTAINED SVT (HCC): Chronic | Status: ACTIVE | Noted: 2020-03-11

## 2020-03-11 PROBLEM — I47.10 SUSTAINED SVT: Chronic | Status: ACTIVE | Noted: 2020-03-11

## 2020-03-11 PROBLEM — I26.99 PULMONARY EMBOLISM WITHOUT ACUTE COR PULMONALE: Chronic | Status: ACTIVE | Noted: 2020-03-11

## 2020-03-11 LAB
AT III PPP CHRO-ACNC: 130 % (ref 75–135)
PROTEIN C-FUNCTIONAL: 120 % (ref 73–180)
PROTEIN S-FUNCTIONAL: 111 % (ref 63–140)

## 2020-03-11 RX ORDER — METOPROLOL SUCCINATE 50 MG/1
50 TABLET, EXTENDED RELEASE ORAL
Qty: 30 TABLET | Refills: 4 | Status: SHIPPED | OUTPATIENT
Start: 2020-03-11 | End: 2022-01-10

## 2020-03-11 RX ORDER — CLOPIDOGREL BISULFATE 75 MG/1
75 TABLET ORAL DAILY
Qty: 30 TABLET | Refills: 11 | Status: SHIPPED | OUTPATIENT
Start: 2020-03-11 | End: 2022-01-10

## 2020-03-11 RX ORDER — HYDROCODONE BITARTRATE AND ACETAMINOPHEN 5; 325 MG/1; MG/1
1 TABLET ORAL EVERY 4 HOURS PRN
Qty: 20 TABLET | Refills: 0 | Status: SHIPPED | OUTPATIENT
Start: 2020-03-11 | End: 2020-03-19

## 2020-03-11 RX ADMIN — RIVAROXABAN 15 MG: 15 TABLET, FILM COATED ORAL at 10:11

## 2020-03-11 RX ADMIN — METOPROLOL SUCCINATE 50 MG: 50 TABLET, EXTENDED RELEASE ORAL at 10:10

## 2020-03-11 RX ADMIN — CLOPIDOGREL BISULFATE 75 MG: 75 TABLET ORAL at 10:11

## 2020-03-11 NOTE — PLAN OF CARE
Problem: Patient Care Overview  Goal: Plan of Care Review  3/11/2020 0420 by Tank Bañuelos RN  Outcome: Ongoing (interventions implemented as appropriate)  Flowsheets (Taken 3/11/2020 0420)  Progress: no change  Plan of Care Reviewed With: patient  Outcome Summary: No acute events overnight. Still having some tenderness at incertion site and lower back. Continue to monitor.

## 2020-03-11 NOTE — PLAN OF CARE
Problem: Patient Care Overview  Goal: Plan of Care Review  Outcome: Ongoing (interventions implemented as appropriate)  Flowsheets (Taken 3/10/2020 8441)  Outcome Summary: No acute events overnight. Patient has rested well. Continue to monitor.

## 2020-03-11 NOTE — PROGRESS NOTES
Case Management Discharge Note                  Transportation Services  Private: Car    Final Discharge Disposition Code: 01 - home or self-care

## 2020-03-11 NOTE — DISCHARGE SUMMARY
Date of Discharge:  3/11/2020    Discharge Diagnosis:     1.  Deep venous thrombosis with local adverse effects.    2.  Pulmonary thromboembolism.    3.  Supraventricular tachycardia.    4.  Chronic acne on therapy.        Hospital Course  Patient is a 30 y.o. female presented with pain and swelling of the right lower extremity.  The right leg was 3 cm bigger in the calf as compared to the left leg and sick centimeters in the thigh.  Patient had local tenderness with visible veins and phlegmasia.  Was noted to have a large thrombus extending into the iliac veins.  Was taken for urgent angiography which revealed occlusive thrombus from the mid superficial femoral vein to the ostium of the common iliac vein.  Mechanical thrombectomy with subsequent angioplasty and then stenting was performed of the iliac veins as well as the common femoral vein with excellent results.  Patient has been monitored for 48 hours.  The leg is back to normal size and equal.  Phlegmasia is resolved.    Patient has been put on anticoagulation therapy needs to be on therapeutic dose for 3 weeks followed by long-term dose.  May have to stay on this lifelong.  Needs to avoid oral contraceptive pills at this time.    Patient did have pulmonary embolus in the right lower lobe when she came into the emergency room a week ago.  This also is being treated medically at this time.    Has a resting heart rate in the 100s may be related to her high-dose Aldactone intake for her acne.  We will add Toprol-XL 50 mg daily to her regimen and her heart rate is doing significantly better.      Her hypercoagulable status work-up up till now has been negative will follow follow-up on the same during her clinic visit in 3 to 4 weeks time.  She has been requested to wear compression stockings to the right lower extremity till she comes back for follow-up.  Procedures Performed  Procedure(s):  Peripheral angiography       Consults:   Consults     No orders found  from 2/9/2020 to 3/10/2020.              Condition on Discharge:  Stable     Vital Signs  Temp:  [98.5 °F (36.9 °C)-98.9 °F (37.2 °C)] 98.7 °F (37.1 °C)  Heart Rate:  [] 91  Resp:  [15-18] 15  BP: (106-130)/(66-75) 106/71    Physical Exam:     General Appearance:    Alert, cooperative, in no acute distress   Head:    Normocephalic, without obvious abnormality, atraumatic   Eyes:            Lids and lashes normal, conjunctivae and sclerae normal, no   icterus, no pallor, corneas clear, PERRLA   Ears:    Ears appear intact with no abnormalities noted   Throat:   No oral lesions, no thrush, oral mucosa moist   Neck:   No adenopathy, supple, trachea midline, no thyromegaly, no     carotid bruit, no JVD   Back:     No kyphosis present, no scoliosis present, no skin lesions,       erythema or scars, no tenderness to percussion or                   palpation,   range of motion normal   Lungs:     Clear to auscultation,respirations regular, even and                   unlabored    Heart:    Regular rhythm and normal rate, normal S1 and S2, no            murmur, no gallop, no rub, no click   Breast Exam:    Deferred   Abdomen:     Normal bowel sounds, no masses, no organomegaly, soft        non-tender, non-distended, no guarding, no rebound                 tenderness   Genitalia:    Deferred   Extremities:   Moves all extremities well, no edema, no cyanosis, no              redness   Pulses:   Pulses palpable and equal bilaterally   Skin:   No bleeding, bruising or rash   Lymph nodes:   No palpable adenopathy   Neurologic:   Cranial nerves 2 - 12 grossly intact, sensation intact, DTR        present and equal bilaterally       LAB DATA :           Laboratory results:    Results from last 7 days   Lab Units 03/10/20  0540 03/09/20  1546 03/06/20  2306   SODIUM mmol/L 138 135* 137   POTASSIUM mmol/L 4.0 4.5 4.5   CHLORIDE mmol/L 100 96* 100   CO2 mmol/L 23.9 27.0 25.0   BUN mg/dL 8 8 11   CREATININE mg/dL 0.51* 0.58 0.75    CALCIUM mg/dL 8.9 9.9 8.9   BILIRUBIN mg/dL  --  0.6 0.3   ALK PHOS U/L  --  68 60   ALT (SGPT) U/L  --  10 12   AST (SGOT) U/L  --  34* 37*   GLUCOSE mg/dL 95 94 134*     Results from last 7 days   Lab Units 03/10/20  0540 03/09/20  1546 03/06/20  2306   WBC 10*3/mm3 9.78 11.82* 13.03*   HEMOGLOBIN g/dL 13.8 15.3 15.0   HEMATOCRIT % 41.0 45.5 45.2   PLATELETS 10*3/mm3 317 327 278                 Results from last 7 days   Lab Units 03/06/20  2306   TROPONIN T ng/mL <0.010                 No results found for: HGBA1C  Results from last 7 days   Lab Units 03/09/20  1546   TSH uIU/mL 4.070           IMAGING DATA:     Us Guided Vascular Access    Result Date: 3/9/2020  Narrative: This procedure was auto-finalized with no dictation required.    Ct Chest Pulmonary Embolism    Result Date: 3/7/2020  Narrative: PROCEDURE: CT CHEST PULMONARY EMBOLISM-  HISTORY: soa, DVT; I26.99-Other pulmonary embolism without acute cor pulmonale; I82.411-Acute embolism and thrombosis of right femoral vein   COMPARISON: None  FINDINGS: Thin section axial CT images of the chest were obtained with contrast. 3D reformatted images were also obtained. This study was performed with techniques to keep radiation doses as low as reasonably achievable, (ALARA). Individualized dose reduction techniques using automated exposure control or adjustment of mA and/or kV according to the patient size were employed.  Pulmonary embolism is seen involving the proximal right descending pulmonary artery. No other pulmonary embolism is identified. There is no evidence of thoracic aortic aneurysm or dissection. There is no evidence of mediastinal or hilar mass or adenopathy.  There is no evidence of pulmonary mass or suspicious nodule. A small opacity is seen in the posterior right lower lobe consistent with atelectasis or a small infarct.  Limited images of the upper abdomen  are unremarkable.       Impression:  Right lower lobe pulmonary embolism.  Mild right  lower lobe atelectasis or pulmonary infarct.  No mass or localized inflammatory process.    This report was finalized on 3/7/2020 5:48 AM by Abhishek Navarrete MD.    Us Venous Doppler Lower Extremity Right (duplex)    Result Date: 3/9/2020  Narrative: PROCEDURE: US VENOUS DOPPLER LOWER EXTREMITY RIGHT (DUPLEX)-  HISTORY: increased pain with recent DVT  PROCEDURE: Multiple transverse and longitudinal scans were performed of the femoropopliteal deep venous system, with augmentation and compression maneuvers.  FINDINGS: There has been interval progression of the patient's deep venous thrombosis which currently involves the right external iliac through the popliteal veins. The calf veins are patent.  No abnormal venous collaterals are seen.      Impression: Interval progression of the patient's deep venous thrombosis which now involves the external iliac through the popliteal veins.  This report was finalized on 3/9/2020 2:59 PM by Isabel Duran M.D..    Us Venous Doppler Lower Extremity Right (duplex)    Result Date: 3/7/2020  Narrative: PROCEDURE: US VENOUS DOPPLER LOWER EXTREMITY RIGHT (DUPLEX)-  HISTORY: pain, swelling . FINDINGS: Sonographic evaluation of the right lower extremity deep venous system was performed. Compression sonography, color Doppler and duplex Doppler was performed. There is deep venous thrombosis involving the right common and superficial femoral veins. Also noted is thrombosis of the right greater saphenous vein.      Impression: Right lower extremity deep venous thrombosis and thrombosis of the right greater saphenous vein.     This report was finalized on 3/7/2020 5:45 AM by Abhishek Navarrete MD.      Discharge Disposition  Home or Self Care    Discharge Medications     Discharge Medications      New Medications      Instructions Start Date   clopidogrel 75 MG tablet  Commonly known as:  PLAVIX   75 mg, Oral, Daily      metoprolol succinate XL 50 MG 24 hr tablet  Commonly known as:   TOPROL-XL   50 mg, Oral, Every 24 Hours Scheduled         Changes to Medications      Instructions Start Date   HYDROcodone-acetaminophen 5-325 MG per tablet  Commonly known as:  NORCO  What changed:  Another medication with the same name was added. Make sure you understand how and when to take each.   1 tablet, Oral, Every 6 Hours PRN      HYDROcodone-acetaminophen 5-325 MG per tablet  Commonly known as:  NORCO  What changed:  You were already taking a medication with the same name, and this prescription was added. Make sure you understand how and when to take each.   1 tablet, Oral, Every 4 Hours PRN      rivaroxaban 20 MG tablet  Commonly known as:  XARELTO  What changed:  Another medication with the same name was added. Make sure you understand how and when to take each.   20 mg, Oral, Daily      rivaroxaban 15 MG tablet  Commonly known as:  XARELTO  What changed:  You were already taking a medication with the same name, and this prescription was added. Make sure you understand how and when to take each.   15 mg, Oral, 2 Times Daily With Meals         Continue These Medications      Instructions Start Date   minocycline 100 MG capsule  Commonly known as:  MINOCIN,DYNACIN   100 mg, Oral, 2 Times Daily      RETIN-A 0.025 % gel  Generic drug:  tretinoin   Topical, Nightly      spironolactone 100 MG tablet  Commonly known as:  ALDACTONE   150 mg, Oral, Daily      MARIAELENA 28 PO   1 tablet, Oral, Daily             Discharge Diet:      Regular   Activity at Discharge:     As tolerated   Follow-up Appointments    carter in 3 weeks time   Test Results Pending at Discharge       Margarito Carter MD  03/11/20  08:02

## 2020-07-19 ENCOUNTER — APPOINTMENT (OUTPATIENT)
Dept: CT IMAGING | Facility: HOSPITAL | Age: 30
End: 2020-07-19

## 2020-07-19 ENCOUNTER — APPOINTMENT (OUTPATIENT)
Dept: ULTRASOUND IMAGING | Facility: HOSPITAL | Age: 30
End: 2020-07-19

## 2020-07-19 ENCOUNTER — HOSPITAL ENCOUNTER (EMERGENCY)
Facility: HOSPITAL | Age: 30
Discharge: HOME | End: 2020-07-19
Attending: EMERGENCY MEDICINE | Admitting: EMERGENCY MEDICINE

## 2020-07-19 VITALS
DIASTOLIC BLOOD PRESSURE: 74 MMHG | OXYGEN SATURATION: 100 % | HEIGHT: 67 IN | HEART RATE: 63 BPM | WEIGHT: 175.4 LBS | SYSTOLIC BLOOD PRESSURE: 119 MMHG | RESPIRATION RATE: 16 BRPM | TEMPERATURE: 97.9 F | BODY MASS INDEX: 27.53 KG/M2

## 2020-07-19 DIAGNOSIS — N83.299 COMPLEX OVARIAN CYST: Primary | ICD-10-CM

## 2020-07-19 DIAGNOSIS — R18.8 FREE FLUID IN PELVIS: ICD-10-CM

## 2020-07-19 DIAGNOSIS — R18.8 OTHER ASCITES: ICD-10-CM

## 2020-07-19 LAB
ALBUMIN SERPL-MCNC: 4 G/DL (ref 3.5–5.2)
ALBUMIN/GLOB SERPL: 1.7 G/DL
ALP SERPL-CCNC: 54 U/L (ref 39–117)
ALT SERPL W P-5'-P-CCNC: 21 U/L (ref 1–33)
ANION GAP SERPL CALCULATED.3IONS-SCNC: 8.3 MMOL/L (ref 5–15)
APTT PPP: 31.9 SECONDS (ref 24.5–37.2)
AST SERPL-CCNC: 89 U/L (ref 1–32)
B-HCG UR QL: NEGATIVE
BASOPHILS # BLD AUTO: 0.05 10*3/MM3 (ref 0–0.2)
BASOPHILS NFR BLD AUTO: 0.5 % (ref 0–1.5)
BILIRUB SERPL-MCNC: 2.1 MG/DL (ref 0–1.2)
BILIRUB UR QL STRIP: NEGATIVE
BUN SERPL-MCNC: 11 MG/DL (ref 6–20)
BUN/CREAT SERPL: 13.4 (ref 7–25)
CALCIUM SPEC-SCNC: 9 MG/DL (ref 8.6–10.5)
CHLORIDE SERPL-SCNC: 101 MMOL/L (ref 98–107)
CLARITY UR: ABNORMAL
CO2 SERPL-SCNC: 25.7 MMOL/L (ref 22–29)
COLOR UR: YELLOW
CREAT SERPL-MCNC: 0.82 MG/DL (ref 0.57–1)
DEPRECATED RDW RBC AUTO: 45.6 FL (ref 37–54)
EOSINOPHIL # BLD AUTO: 0.05 10*3/MM3 (ref 0–0.4)
EOSINOPHIL NFR BLD AUTO: 0.5 % (ref 0.3–6.2)
ERYTHROCYTE [DISTWIDTH] IN BLOOD BY AUTOMATED COUNT: 13.4 % (ref 12.3–15.4)
GFR SERPL CREATININE-BSD FRML MDRD: 83 ML/MIN/1.73
GLOBULIN UR ELPH-MCNC: 2.4 GM/DL
GLUCOSE SERPL-MCNC: 95 MG/DL (ref 65–99)
GLUCOSE UR STRIP-MCNC: NEGATIVE MG/DL
HCT VFR BLD AUTO: 43.6 % (ref 34–46.6)
HGB BLD-MCNC: 14.7 G/DL (ref 12–15.9)
HGB UR QL STRIP.AUTO: NEGATIVE
IMM GRANULOCYTES # BLD AUTO: 0.04 10*3/MM3 (ref 0–0.05)
IMM GRANULOCYTES NFR BLD AUTO: 0.4 % (ref 0–0.5)
INR PPP: 1.82 (ref 0.9–1.1)
KETONES UR QL STRIP: ABNORMAL
LEUKOCYTE ESTERASE UR QL STRIP.AUTO: NEGATIVE
LYMPHOCYTES # BLD AUTO: 1.12 10*3/MM3 (ref 0.7–3.1)
LYMPHOCYTES NFR BLD AUTO: 11.8 % (ref 19.6–45.3)
MCH RBC QN AUTO: 31.4 PG (ref 26.6–33)
MCHC RBC AUTO-ENTMCNC: 33.7 G/DL (ref 31.5–35.7)
MCV RBC AUTO: 93.2 FL (ref 79–97)
MONOCYTES # BLD AUTO: 0.9 10*3/MM3 (ref 0.1–0.9)
MONOCYTES NFR BLD AUTO: 9.5 % (ref 5–12)
NEUTROPHILS NFR BLD AUTO: 7.34 10*3/MM3 (ref 1.7–7)
NEUTROPHILS NFR BLD AUTO: 77.3 % (ref 42.7–76)
NITRITE UR QL STRIP: NEGATIVE
NRBC BLD AUTO-RTO: 0 /100 WBC (ref 0–0.2)
PH UR STRIP.AUTO: 6 [PH] (ref 5–8)
PLATELET # BLD AUTO: 205 10*3/MM3 (ref 140–450)
PMV BLD AUTO: 9.3 FL (ref 6–12)
POTASSIUM SERPL-SCNC: 4 MMOL/L (ref 3.5–5.2)
PROT SERPL-MCNC: 6.4 G/DL (ref 6–8.5)
PROT UR QL STRIP: NEGATIVE
PROTHROMBIN TIME: 22.1 SECONDS (ref 12–15.1)
RBC # BLD AUTO: 4.68 10*6/MM3 (ref 3.77–5.28)
SODIUM SERPL-SCNC: 135 MMOL/L (ref 136–145)
SP GR UR STRIP: 1.02 (ref 1–1.03)
UROBILINOGEN UR QL STRIP: ABNORMAL
WBC # BLD AUTO: 9.5 10*3/MM3 (ref 3.4–10.8)

## 2020-07-19 PROCEDURE — 85610 PROTHROMBIN TIME: CPT | Performed by: PHYSICIAN ASSISTANT

## 2020-07-19 PROCEDURE — 93970 EXTREMITY STUDY: CPT

## 2020-07-19 PROCEDURE — 80053 COMPREHEN METABOLIC PANEL: CPT | Performed by: PHYSICIAN ASSISTANT

## 2020-07-19 PROCEDURE — 99283 EMERGENCY DEPT VISIT LOW MDM: CPT

## 2020-07-19 PROCEDURE — 76856 US EXAM PELVIC COMPLETE: CPT

## 2020-07-19 PROCEDURE — 71275 CT ANGIOGRAPHY CHEST: CPT

## 2020-07-19 PROCEDURE — 81025 URINE PREGNANCY TEST: CPT | Performed by: PHYSICIAN ASSISTANT

## 2020-07-19 PROCEDURE — 85025 COMPLETE CBC W/AUTO DIFF WBC: CPT | Performed by: PHYSICIAN ASSISTANT

## 2020-07-19 PROCEDURE — 74176 CT ABD & PELVIS W/O CONTRAST: CPT

## 2020-07-19 PROCEDURE — 25010000002 IOPAMIDOL 61 % SOLUTION: Performed by: EMERGENCY MEDICINE

## 2020-07-19 PROCEDURE — 81003 URINALYSIS AUTO W/O SCOPE: CPT | Performed by: PHYSICIAN ASSISTANT

## 2020-07-19 PROCEDURE — 85730 THROMBOPLASTIN TIME PARTIAL: CPT | Performed by: PHYSICIAN ASSISTANT

## 2020-07-19 RX ADMIN — IOPAMIDOL 100 ML: 612 INJECTION, SOLUTION INTRAVENOUS at 11:25

## 2020-07-19 NOTE — ED PROVIDER NOTES
Subjective   30-year-old female presents with lower abdominal and groin pain, and shortness of breath.  She has a significant past medical history of recent DVT in her right lower extremity and a right-sided PE in March.  She had a thrombectomy of the DVT in the right lower extremity, and she is on anticoagulation.  She has followed up with Dr. Yan and had subsequent ultrasounds of the right lower extremity, but she has not had any repeat scans of the chest.  She states this morning she woke up and had low abdominal pain, and felt short of breath.  The pain in her lower abdomen and groin area feels like pressure.  She denies any urinary symptoms.      History provided by:  Patient   used: No        Review of Systems   Respiratory: Positive for shortness of breath.    Gastrointestinal: Positive for abdominal pain.   All other systems reviewed and are negative.      Past Medical History:   Diagnosis Date   • Family history of blood clots        No Known Allergies    Past Surgical History:   Procedure Laterality Date   • CARDIAC CATHETERIZATION Right 3/9/2020    Procedure: Peripheral angiography;  Surgeon: Margarito Yan MD;  Location: [facility] CATH INVASIVE LOCATION;  Service: Cardiovascular;  Laterality: Right;  Prone/ R. Popliteal Access       History reviewed. No pertinent family history.    Social History     Socioeconomic History   • Marital status:      Spouse name: Not on file   • Number of children: Not on file   • Years of education: Not on file   • Highest education level: Not on file   Tobacco Use   • Smoking status: Never Smoker   Substance and Sexual Activity   • Alcohol use: Never     Frequency: Never   • Drug use: Never   • Sexual activity: Yes           Objective   Physical Exam   Constitutional: She is oriented to person, place, and time. She appears well-developed and well-nourished.   HENT:   Head: Normocephalic.   Eyes: EOM are normal.   Neck: Normal range of  motion. Neck supple.   Cardiovascular: Normal rate and regular rhythm.   Pulmonary/Chest: Effort normal and breath sounds normal.   Abdominal: Soft. Bowel sounds are normal.   Musculoskeletal: Normal range of motion.   Neurological: She is alert and oriented to person, place, and time. She has normal reflexes.   Skin: Skin is warm and dry.   Psychiatric: She has a normal mood and affect.   Nursing note and vitals reviewed.      Procedures           ED Course  ED Course as of Jul 23 1301   Sun Jul 19, 2020   1258 CT scan and ultrasounds clear, patient is still in pain, pain appears to be more suprapubic at this time than in her groin, I will order CT scan abdomen pelvis.    [CS]      ED Course User Index  [CS] Abhishek Taylor Jr., PA-C                                           MDM  Number of Diagnoses or Management Options  Complex ovarian cyst: new and requires workup  Free fluid in pelvis: new and requires workup  Other ascites: new and requires workup     Amount and/or Complexity of Data Reviewed  Clinical lab tests: reviewed  Tests in the radiology section of CPT®: reviewed    Risk of Complications, Morbidity, and/or Mortality  Presenting problems: minimal  Diagnostic procedures: minimal  Management options: minimal    Patient Progress  Patient progress: stable      Final diagnoses:   Complex ovarian cyst   Free fluid in pelvis   Other ascites            Abhishek Taylor Jr., PA-C  07/23/20 1301

## 2020-07-29 ENCOUNTER — OFFICE VISIT (OUTPATIENT)
Dept: OBSTETRICS AND GYNECOLOGY | Facility: CLINIC | Age: 30
End: 2020-07-29

## 2020-07-29 VITALS
WEIGHT: 171 LBS | SYSTOLIC BLOOD PRESSURE: 118 MMHG | HEIGHT: 67 IN | DIASTOLIC BLOOD PRESSURE: 70 MMHG | BODY MASS INDEX: 26.84 KG/M2

## 2020-07-29 DIAGNOSIS — N83.209 CYST OF OVARY, UNSPECIFIED LATERALITY: Primary | ICD-10-CM

## 2020-07-29 PROCEDURE — 99203 OFFICE O/P NEW LOW 30 MIN: CPT | Performed by: OBSTETRICS & GYNECOLOGY

## 2020-07-29 NOTE — PROGRESS NOTES
Subjective   Chief Complaint   Patient presents with   • ER Follow UP     pt was seen in ER 2020, Pt has h/o of blood clots, TVS/Ct scan was done showed ruptured ovarian cyst and was told to follow up with OBGYN      Christine Black is a 30 y.o. year old .  Patient's last menstrual period was 2020 (approximate).  She presents to be seen because of ruptured ovarian cyst.     OTHER COMPLAINTS:  Nothing else    The following portions of the patient's history were reviewed and updated as appropriate:  She  has a past medical history of Family history of blood clots, History of blood clots, and Ovarian cyst.  She does not have any pertinent problems on file.  She  has a past surgical history that includes Cardiac catheterization (Right, 3/9/2020) and Other surgical history.  Her family history includes Breast cancer in her paternal aunt.  She  reports that she has never smoked. She has never used smokeless tobacco. She reports that she does not drink alcohol or use drugs.  Current Outpatient Medications   Medication Sig Dispense Refill   • clopidogrel (PLAVIX) 75 MG tablet Take 1 tablet by mouth Daily. 30 tablet 11   • metoprolol succinate XL (TOPROL-XL) 50 MG 24 hr tablet Take 1 tablet by mouth Daily. 30 tablet 4   • minocycline (MINOCIN,DYNACIN) 100 MG capsule Take 100 mg by mouth 2 (Two) Times a Day.     • rivaroxaban (XARELTO) 20 MG tablet Take 1 tablet by mouth Daily. 30 tablet 0   • Rivaroxaban (XARELTO) tablet therapy pack starter pack Take one 15 mg tablet twice daily with food for 21 days.  Followed by one 20 mg tablet by mouth once daily with food. 51 each 0   • spironolactone (ALDACTONE) 100 MG tablet Take 150 mg by mouth Daily.     • tretinoin (RETIN-A) 0.025 % gel Apply  topically to the appropriate area as directed Every Night.       No current facility-administered medications for this visit.      Current Outpatient Medications on File Prior to Visit   Medication Sig   • clopidogrel  "(PLAVIX) 75 MG tablet Take 1 tablet by mouth Daily.   • metoprolol succinate XL (TOPROL-XL) 50 MG 24 hr tablet Take 1 tablet by mouth Daily.   • minocycline (MINOCIN,DYNACIN) 100 MG capsule Take 100 mg by mouth 2 (Two) Times a Day.   • rivaroxaban (XARELTO) 20 MG tablet Take 1 tablet by mouth Daily.   • Rivaroxaban (XARELTO) tablet therapy pack starter pack Take one 15 mg tablet twice daily with food for 21 days.  Followed by one 20 mg tablet by mouth once daily with food.   • spironolactone (ALDACTONE) 100 MG tablet Take 150 mg by mouth Daily.   • tretinoin (RETIN-A) 0.025 % gel Apply  topically to the appropriate area as directed Every Night.   • [DISCONTINUED] Drospirenone-Ethinyl Estradiol (MARIAELENA 28 PO) Take 1 tablet by mouth Daily.   • [DISCONTINUED] HYDROcodone-acetaminophen (NORCO) 5-325 MG per tablet Take 1 tablet by mouth Every 6 (Six) Hours As Needed for Severe Pain .     No current facility-administered medications on file prior to visit.      She has No Known Allergies.    Social History    Tobacco Use      Smoking status: Never Smoker      Smokeless tobacco: Never Used    Review of Systems  Consitutional POS: nothing reported    NEG: anorexia or night sweats   Gastointestinal POS: nothing reported    NEG: bloating, change in bowel habits, melena or reflux symptoms   Genitourinary POS: nothing reported    NEG: dysuria or hematuria   Integument POS: nothing reported    NEG: moles that are changing in size, shape, color or rashes   Breast POS: nothing reported    NEG: persistent breast lump, skin dimpling or nipple discharge         Respiratory: negative  Cardiovascular: negative          Objective   /70   Ht 170.2 cm (67\")   Wt 77.6 kg (171 lb)   LMP 07/01/2020 (Approximate)   BMI 26.78 kg/m²     General:  well developed; well nourished  no acute distress   Skin:  No suspicious lesions seen   Thyroid: normal to inspection and palpation   Lungs:  breathing is unlabored  clear to auscultation " bilaterally   Heart:  regular rate and rhythm, S1, S2 normal, no murmur, click, rub or gallop   Breasts:  Not performed.   Abdomen: soft, non-tender; no masses  no umbilical or inguinal hernias are present  no hepato-splenomegaly   Pelvis: Not performed.     Psychiatric: Alert and oriented ×3, mood and affect appropriate  HEENT: Atraumatic, normocephalic, normal scleral icterus  Extremities: 2+ pulses bilaterally, no edema      Lab Review   CBC and CMP    Imaging   Pelvic ultrasound report        Assessment   1. REoslving ruptures ovarian cyst     Plan   1. PRecuaitons given  2. Discussed IUD  3.     No orders of the defined types were placed in this encounter.         This note was electronically signed.      July 29, 2020

## 2021-10-28 ENCOUNTER — OFFICE VISIT (OUTPATIENT)
Dept: OBSTETRICS AND GYNECOLOGY | Facility: CLINIC | Age: 31
End: 2021-10-28

## 2021-10-28 VITALS
SYSTOLIC BLOOD PRESSURE: 116 MMHG | HEIGHT: 67 IN | BODY MASS INDEX: 28.09 KG/M2 | DIASTOLIC BLOOD PRESSURE: 74 MMHG | WEIGHT: 179 LBS

## 2021-10-28 DIAGNOSIS — N92.1 BREAKTHROUGH BLEEDING WITH IUD: Primary | ICD-10-CM

## 2021-10-28 DIAGNOSIS — Z97.5 BREAKTHROUGH BLEEDING WITH IUD: Primary | ICD-10-CM

## 2021-10-28 PROCEDURE — 99213 OFFICE O/P EST LOW 20 MIN: CPT | Performed by: OBSTETRICS & GYNECOLOGY

## 2021-10-28 NOTE — PROGRESS NOTES
Subjective   Chief Complaint   Patient presents with   • AUB with paragard     paragard insertion from PCP 2020, c/o of heavy bleeding with clots, changingtampon every 30 mins, hx of bloodclots and is currently on blood thinner      Christine Black is a 31 y.o. year old .  No LMP recorded. Patient has had an implant.  She presents to be seen because of persistent menorrhagia.  Patient has history of DVTs PEs requiring thrombectomy and stent removal.  She had a negative coagulation work-up.  She has stents right femoral.  She will be on lifelong anticoagulation.  She is on Xarelto currently.  She is off Plavix.  She had a ParaGard placed 1 year ago has had worsening worsening periods which is to be expected.     OTHER COMPLAINTS:  Nothing else    The following portions of the patient's history were reviewed and updated as appropriate:  She  has a past medical history of Family history of blood clots, History of blood clots, and Ovarian cyst.  She does not have any pertinent problems on file.  She  has a past surgical history that includes Cardiac catheterization (Right, 3/9/2020) and Other surgical history.  Her family history includes Breast cancer in her paternal aunt.  She  reports that she has never smoked. She has never used smokeless tobacco. She reports that she does not drink alcohol and does not use drugs.  Current Outpatient Medications   Medication Sig Dispense Refill   • clopidogrel (PLAVIX) 75 MG tablet Take 1 tablet by mouth Daily. 30 tablet 11   • metoprolol succinate XL (TOPROL-XL) 50 MG 24 hr tablet Take 1 tablet by mouth Daily. 30 tablet 4   • minocycline (MINOCIN,DYNACIN) 100 MG capsule Take 100 mg by mouth 2 (Two) Times a Day.     • rivaroxaban (XARELTO) 20 MG tablet Take 1 tablet by mouth Daily. 30 tablet 0   • Rivaroxaban (XARELTO) tablet therapy pack starter pack Take one 15 mg tablet twice daily with food for 21 days.  Followed by one 20 mg tablet by mouth once daily with food. 51 each  "0   • spironolactone (ALDACTONE) 100 MG tablet Take 150 mg by mouth Daily.     • tretinoin (RETIN-A) 0.025 % gel Apply  topically to the appropriate area as directed Every Night.       No current facility-administered medications for this visit.     Current Outpatient Medications on File Prior to Visit   Medication Sig   • clopidogrel (PLAVIX) 75 MG tablet Take 1 tablet by mouth Daily.   • metoprolol succinate XL (TOPROL-XL) 50 MG 24 hr tablet Take 1 tablet by mouth Daily.   • minocycline (MINOCIN,DYNACIN) 100 MG capsule Take 100 mg by mouth 2 (Two) Times a Day.   • rivaroxaban (XARELTO) 20 MG tablet Take 1 tablet by mouth Daily.   • Rivaroxaban (XARELTO) tablet therapy pack starter pack Take one 15 mg tablet twice daily with food for 21 days.  Followed by one 20 mg tablet by mouth once daily with food.   • spironolactone (ALDACTONE) 100 MG tablet Take 150 mg by mouth Daily.   • tretinoin (RETIN-A) 0.025 % gel Apply  topically to the appropriate area as directed Every Night.     No current facility-administered medications on file prior to visit.     She has No Known Allergies.    Social History    Tobacco Use      Smoking status: Never Smoker      Smokeless tobacco: Never Used    Review of Systems  Consitutional POS: nothing reported    NEG: anorexia or night sweats   Gastointestinal POS: nothing reported    NEG: bloating, change in bowel habits, melena or reflux symptoms   Genitourinary POS: nothing reported    NEG: dysuria or hematuria   Integument POS: nothing reported    NEG: moles that are changing in size, shape, color or rashes   Breast POS: nothing reported    NEG: persistent breast lump, skin dimpling or nipple discharge         Respiratory: negative  Cardiovascular: negative          Objective   /74   Ht 170.2 cm (67\")   Wt 81.2 kg (179 lb)   BMI 28.04 kg/m²     General:  well developed; well nourished  no acute distress   Skin:  Not performed.   Thyroid: normal to inspection and palpation "   Lungs:  breathing is unlabored  clear to auscultation bilaterally   Heart:  Not performed.   Breasts:  Not performed.   Abdomen: soft, non-tender; no masses  no umbilical or inguinal hernias are present  no hepato-splenomegaly   Pelvis: Not performed.     Psychiatric: Alert and oriented ×3, mood and affect appropriate  HEENT: Atraumatic, normocephalic, normal scleral icterus  Extremities: 2+ pulses bilaterally, no edema      Lab Review   No data reviewed    Imaging   No data reviewed        Assessment   1. History of DVT PE and stent placement.  Lifelong anticoagulation  2. ParaGard with severe menorrhagia secondary to above as well as this     Plan   1. Mirena has been ordered.  Patient will return at her convenience for placement  2.     No orders of the defined types were placed in this encounter.         This note was electronically signed.      October 28, 2021

## 2022-01-10 ENCOUNTER — OFFICE VISIT (OUTPATIENT)
Dept: INTERNAL MEDICINE | Facility: CLINIC | Age: 32
End: 2022-01-10

## 2022-01-10 VITALS
OXYGEN SATURATION: 99 % | HEART RATE: 81 BPM | SYSTOLIC BLOOD PRESSURE: 122 MMHG | HEIGHT: 67 IN | TEMPERATURE: 97.1 F | WEIGHT: 190 LBS | DIASTOLIC BLOOD PRESSURE: 80 MMHG | BODY MASS INDEX: 29.82 KG/M2

## 2022-01-10 DIAGNOSIS — Z86.718 HISTORY OF BLOOD CLOTS: ICD-10-CM

## 2022-01-10 DIAGNOSIS — I82.409: ICD-10-CM

## 2022-01-10 DIAGNOSIS — I82.521 CHRONIC DEEP VEIN THROMBOSIS (DVT) OF RIGHT ILIAC VEIN: ICD-10-CM

## 2022-01-10 DIAGNOSIS — N83.201 CYST OF RIGHT OVARY: ICD-10-CM

## 2022-01-10 DIAGNOSIS — Z97.5 IUD (INTRAUTERINE DEVICE) IN PLACE: ICD-10-CM

## 2022-01-10 DIAGNOSIS — Z00.00 ANNUAL PHYSICAL EXAM: ICD-10-CM

## 2022-01-10 DIAGNOSIS — I47.1 SUSTAINED SVT: Chronic | ICD-10-CM

## 2022-01-10 DIAGNOSIS — I26.99 OTHER PULMONARY EMBOLISM WITHOUT ACUTE COR PULMONALE, UNSPECIFIED CHRONICITY: Primary | Chronic | ICD-10-CM

## 2022-01-10 DIAGNOSIS — L70.8 OTHER ACNE: ICD-10-CM

## 2022-01-10 PROCEDURE — 99385 PREV VISIT NEW AGE 18-39: CPT | Performed by: INTERNAL MEDICINE

## 2022-01-10 PROCEDURE — 99213 OFFICE O/P EST LOW 20 MIN: CPT | Performed by: INTERNAL MEDICINE

## 2022-01-10 NOTE — PROGRESS NOTES
Subjective   Christine Black is a 32 y.o. female and is here for a comprehensive physical exam.   Patient here to establish care also for physical.  Patient has history of blood clots in lower extremity and in the lungs.  Patient is on Xarelto for life.  History of SVT resolved without medication.  Patient was seen by cardiologist in [facility].  Patient does have IUD now and also history of right ovary cyst rupture.  Acne is on medication stable.  Do you take any herbs or supplements that were not prescribed by a doctor? no  Are you taking calcium supplements? no  Are you taking aspirin daily? no      The following portions of the patient's history were reviewed and updated as appropriate: allergies, current medications, past family history, past medical history, past social history, past surgical history and problem list.      Review of Systems   Constitutional: Negative.    HENT: Negative.    Eyes: Negative.    Respiratory: Negative.    Cardiovascular: Negative.    Gastrointestinal: Negative.    Endocrine: Negative.    Genitourinary: Negative.    Musculoskeletal: Negative.    Skin: Negative.    Allergic/Immunologic: Negative.    Neurological: Negative.    Hematological: Negative.    Psychiatric/Behavioral: Negative.          Physical Exam  Constitutional:       Appearance: She is well-developed.   HENT:      Head: Normocephalic and atraumatic.      Right Ear: External ear normal.      Left Ear: External ear normal.      Nose: Nose normal.   Eyes:      Conjunctiva/sclera: Conjunctivae normal.      Pupils: Pupils are equal, round, and reactive to light.   Cardiovascular:      Rate and Rhythm: Normal rate and regular rhythm.      Heart sounds: Normal heart sounds.   Pulmonary:      Effort: Pulmonary effort is normal.      Breath sounds: Normal breath sounds.   Abdominal:      General: Bowel sounds are normal.      Palpations: Abdomen is soft.   Genitourinary:     Vagina: Normal.    Musculoskeletal:         General: Normal range of motion.      Cervical back: Normal range of motion and neck supple.   Skin:     General: Skin is warm and dry.   Neurological:      Mental Status: She is alert and oriented to person, place, and time.      Deep Tendon Reflexes: Reflexes are normal and symmetric.   Psychiatric:         Behavior: Behavior normal.         Thought Content: Thought content normal.         Judgment: Judgment normal.         All  tests have been reviewed.    Assessment/Plan          1. Patient Counseling:  --Nutrition: Stressed importance of moderation in sodium/caffeine intake, saturated fat and cholesterol, caloric balance, sufficient intake of fresh fruits, vegetables, fiber, calcium and iron.  --Exercise: Stressed the importance of regular exercise.   --Injury prevention: Discussed safety belts, safety helmets, smoke detector, smoking near bedding or upholstery.   --Dental health: Discussed importance of regular tooth brushing, flossing, and dental visits.  --Immunizations reviewed.  --Discussed benefits of screening colonoscopy.  --After hours service discussed with patient    2. Discussed the patient's BMI with her.            Other pulmonary embolism without acute cor pulmonale, unspecified chronicity (HCC) cont xarelto    Progression of deep vein thrombosis (DVT) (HCC)    History of blood clots    Chronic deep vein thrombosis (DVT) of right iliac vein (HCC)    Sustained SVT (HCC) stable without med seen by cardio    Other acne cont med    IUD (intrauterine device) in place follow gyn    Cyst of right ovary hx    Annual physical exam  -     CBC & Differential  -     Comprehensive Metabolic Panel  -     Lipid Panel  -     TSH  -     Hepatitis C Antibody    Other orders  -     NON FORMULARY; Paraguard IUD    Annual phys

## 2022-01-11 LAB
ALBUMIN SERPL-MCNC: 4.7 G/DL (ref 3.5–5.2)
ALBUMIN/GLOB SERPL: 2.2 G/DL
ALP SERPL-CCNC: 60 U/L (ref 39–117)
ALT SERPL-CCNC: 23 U/L (ref 1–33)
AST SERPL-CCNC: 66 U/L (ref 1–32)
BASOPHILS # BLD AUTO: 0.02 10*3/MM3 (ref 0–0.2)
BASOPHILS NFR BLD AUTO: 0.4 % (ref 0–1.5)
BILIRUB SERPL-MCNC: 1 MG/DL (ref 0–1.2)
BUN SERPL-MCNC: 15 MG/DL (ref 6–20)
BUN/CREAT SERPL: 17 (ref 7–25)
CALCIUM SERPL-MCNC: 9.4 MG/DL (ref 8.6–10.5)
CHLORIDE SERPL-SCNC: 103 MMOL/L (ref 98–107)
CHOLEST SERPL-MCNC: 187 MG/DL (ref 0–200)
CO2 SERPL-SCNC: 26.9 MMOL/L (ref 22–29)
CREAT SERPL-MCNC: 0.88 MG/DL (ref 0.57–1)
EOSINOPHIL # BLD AUTO: 0.12 10*3/MM3 (ref 0–0.4)
EOSINOPHIL NFR BLD AUTO: 2.7 % (ref 0.3–6.2)
ERYTHROCYTE [DISTWIDTH] IN BLOOD BY AUTOMATED COUNT: 13.8 % (ref 12.3–15.4)
GLOBULIN SER CALC-MCNC: 2.1 GM/DL
GLUCOSE SERPL-MCNC: 90 MG/DL (ref 65–99)
HCT VFR BLD AUTO: 44.3 % (ref 34–46.6)
HCV AB S/CO SERPL IA: <0.1 S/CO RATIO (ref 0–0.9)
HDLC SERPL-MCNC: 80 MG/DL (ref 40–60)
HGB BLD-MCNC: 14.4 G/DL (ref 12–15.9)
IMM GRANULOCYTES # BLD AUTO: 0.01 10*3/MM3 (ref 0–0.05)
IMM GRANULOCYTES NFR BLD AUTO: 0.2 % (ref 0–0.5)
LDLC SERPL CALC-MCNC: 97 MG/DL (ref 0–100)
LYMPHOCYTES # BLD AUTO: 1.18 10*3/MM3 (ref 0.7–3.1)
LYMPHOCYTES NFR BLD AUTO: 26.5 % (ref 19.6–45.3)
MCH RBC QN AUTO: 27.7 PG (ref 26.6–33)
MCHC RBC AUTO-ENTMCNC: 32.5 G/DL (ref 31.5–35.7)
MCV RBC AUTO: 85.4 FL (ref 79–97)
MONOCYTES # BLD AUTO: 0.47 10*3/MM3 (ref 0.1–0.9)
MONOCYTES NFR BLD AUTO: 10.6 % (ref 5–12)
NEUTROPHILS # BLD AUTO: 2.65 10*3/MM3 (ref 1.7–7)
NEUTROPHILS NFR BLD AUTO: 59.6 % (ref 42.7–76)
NRBC BLD AUTO-RTO: 0 /100 WBC (ref 0–0.2)
PLATELET # BLD AUTO: 260 10*3/MM3 (ref 140–450)
POTASSIUM SERPL-SCNC: 4.6 MMOL/L (ref 3.5–5.2)
PROT SERPL-MCNC: 6.8 G/DL (ref 6–8.5)
RBC # BLD AUTO: 5.19 10*6/MM3 (ref 3.77–5.28)
SODIUM SERPL-SCNC: 139 MMOL/L (ref 136–145)
TRIGL SERPL-MCNC: 51 MG/DL (ref 0–150)
TSH SERPL DL<=0.005 MIU/L-ACNC: 2.56 UIU/ML (ref 0.27–4.2)
VLDLC SERPL CALC-MCNC: 10 MG/DL (ref 5–40)
WBC # BLD AUTO: 4.45 10*3/MM3 (ref 3.4–10.8)

## 2022-01-14 ENCOUNTER — TELEPHONE (OUTPATIENT)
Dept: INTERNAL MEDICINE | Facility: CLINIC | Age: 32
End: 2022-01-14

## 2022-01-14 ENCOUNTER — PATIENT ROUNDING (BHMG ONLY) (OUTPATIENT)
Dept: INTERNAL MEDICINE | Facility: CLINIC | Age: 32
End: 2022-01-14

## 2022-01-14 NOTE — TELEPHONE ENCOUNTER
Caller: Christine Black    Relationship: Self    Best call back number:    [phone]    Requested Prescriptions:   Requested Prescriptions     Pending Prescriptions Disp Refills   • rivaroxaban (XARELTO) 20 MG tablet 30 tablet 0     Sig: Take 1 tablet by mouth Daily.       minocycline (MINOCIN,DYNACIN) 100 MG capsule     spironolactone (ALDACTONE) 100 MG tablet    PATIENT STATED SHE TAKES (150) MG OF THE SPIRONOLACTONE DAILY    IT WAS NOTED THAT THE TWO MEDICATIONS LISTED DIRECTLY ABOVE NEED TO BE REORDERED    Pharmacy where request should be sent:      [facility]    TELEPHONE CONTACT:    [phone]    Additional details provided by patient:     PATIENT STATED SHE HAS APPROXIMATELY A (5) DAY SUPPLY LEFT ON MEDICATIONS    Does the patient have less than a 3 day supply:  [] Yes  [x] No    Missael Huddleston Rep   01/14/22 13:57 EST     DR WILLIAM

## 2022-01-14 NOTE — PROGRESS NOTES
A [employer] message has been sent to patient for PATIENT ROUNDING with [facility].

## 2022-01-19 NOTE — TELEPHONE ENCOUNTER
Caller: Christine Black    Relationship: Self    Best call back number: [phone]     Requested Prescriptions:   Requested Prescriptions     Pending Prescriptions Disp Refills   • minocycline (MINOCIN,DYNACIN) 100 MG capsule       Sig: Take 1 capsule by mouth.   • spironolactone (ALDACTONE) 100 MG tablet       Sig: Take 1.5 tablets by mouth Daily.        Pharmacy where request should be sent: [facility] [phone] [facility] [phone] [facility]     Additional details provided by patient: OUT OF MEDICATION     Does the patient have less than a 3 day supply:  [x] Yes  [] No    Missael Handley Rep   01/19/22 16:15 EST

## 2022-01-19 NOTE — TELEPHONE ENCOUNTER
[facility] [address] AT [facility] - [phone] [facility] [phone] FX    Relationship: Pharmacy        Requested Prescriptions:   Requested Prescriptions     Pending Prescriptions Disp Refills   • minocycline (MINOCIN,DYNACIN) 100 MG capsule       Sig: Take 1 capsule by mouth.   • spironolactone (ALDACTONE) 100 MG tablet       Sig: Take 1.5 tablets by mouth Daily.   • rivaroxaban (XARELTO) 20 MG tablet 30 tablet 0     Sig: Take 1 tablet by mouth Daily.            Does the patient have less than a 3 day supply:  [x] Yes  [] No    Missael Chatterjee Rep   01/19/22 16:28 EST     ”

## 2022-01-20 NOTE — TELEPHONE ENCOUNTER
Rx Refill Note  Requested Prescriptions     Pending Prescriptions Disp Refills   • minocycline (MINOCIN,DYNACIN) 100 MG capsule 180 capsule 0     Sig: Take 1 capsule by mouth 2 (Two) Times a Day.   • spironolactone (ALDACTONE) 100 MG tablet 45 tablet 3     Sig: Take 1.5 tablets by mouth Daily.      Last office visit with prescribing clinician: 1/10/2022      Next office visit with prescribing clinician: 1/19/2022            Silvia Ramesh MA  01/20/22, 10:02 EST       Please advise since you have not prescribed medication in past.

## 2022-01-20 NOTE — TELEPHONE ENCOUNTER
Rx Refill Note  Requested Prescriptions     Pending Prescriptions Disp Refills   • minocycline (MINOCIN,DYNACIN) 100 MG capsule 180 capsule 0     Sig: Take 1 capsule by mouth 2 (Two) Times a Day.   • spironolactone (ALDACTONE) 100 MG tablet 45 tablet 3     Sig: Take 1.5 tablets by mouth Daily.      Last office visit with prescribing clinician: 1/10/2022      Next office visit with prescribing clinician: 1/19/2022            Silvia Ramesh MA  01/20/22, 10:03 EST

## 2022-01-21 RX ORDER — MINOCYCLINE HYDROCHLORIDE 100 MG/1
100 CAPSULE ORAL 2 TIMES DAILY
Qty: 180 CAPSULE | Refills: 0 | Status: SHIPPED | OUTPATIENT
Start: 2022-01-21 | End: 2022-07-08 | Stop reason: SDUPTHER

## 2022-01-21 RX ORDER — SPIRONOLACTONE 100 MG/1
150 TABLET, FILM COATED ORAL DAILY
Qty: 45 TABLET | Refills: 3 | Status: SHIPPED | OUTPATIENT
Start: 2022-01-21 | End: 2022-08-10 | Stop reason: SDUPTHER

## 2022-02-14 NOTE — TELEPHONE ENCOUNTER
Rx Refill Note  Requested Prescriptions     Pending Prescriptions Disp Refills   • rivaroxaban (XARELTO) 20 MG tablet 30 tablet 0     Sig: Take 1 tablet by mouth Daily.      Last office visit with prescribing clinician: 1/10/2022      Next office visit with prescribing clinician: Visit date not found            Silvia Ramesh MA  02/14/22, 09:00 EST

## 2022-04-30 DIAGNOSIS — R18.8 OTHER ASCITES: ICD-10-CM

## 2022-04-30 DIAGNOSIS — E27.8 ADRENAL CYST: Primary | ICD-10-CM

## 2022-05-02 ENCOUNTER — TELEPHONE (OUTPATIENT)
Dept: INTERNAL MEDICINE | Facility: CLINIC | Age: 32
End: 2022-05-02

## 2022-05-02 NOTE — TELEPHONE ENCOUNTER
----- Message from Vinita Hall sent at 5/2/2022  3:10 PM EDT -----  Patient called and states she received a call to schedule her for an u/s complete ordered by dr alcantara. She states she has not seen dr alcantara since January and not sure why this was ordered sat 4/30/22. Was this an error?

## 2022-05-02 NOTE — TELEPHONE ENCOUNTER
Per Dr. Urban:;    Abnormal liver enzyme and a history of ascites. adrenal gland lesion ultrasound ordered    Contacted patient; patient is scheduled 05/09/2022 with PCP for further evaluation

## 2022-05-09 ENCOUNTER — OFFICE VISIT (OUTPATIENT)
Dept: INTERNAL MEDICINE | Facility: CLINIC | Age: 32
End: 2022-05-09

## 2022-05-09 VITALS
DIASTOLIC BLOOD PRESSURE: 74 MMHG | SYSTOLIC BLOOD PRESSURE: 120 MMHG | WEIGHT: 174 LBS | OXYGEN SATURATION: 99 % | BODY MASS INDEX: 27.31 KG/M2 | HEIGHT: 67 IN | TEMPERATURE: 97.1 F | HEART RATE: 83 BPM

## 2022-05-09 DIAGNOSIS — L70.9 ACNE, UNSPECIFIED ACNE TYPE: ICD-10-CM

## 2022-05-09 DIAGNOSIS — R74.8 ABNORMAL LIVER ENZYMES: Primary | ICD-10-CM

## 2022-05-09 PROCEDURE — 99214 OFFICE O/P EST MOD 30 MIN: CPT | Performed by: INTERNAL MEDICINE

## 2022-05-09 RX ORDER — TRETINOIN 0.25 MG/G
GEL TOPICAL NIGHTLY
Qty: 20 G | Refills: 0 | Status: SHIPPED | OUTPATIENT
Start: 2022-05-09

## 2022-05-09 NOTE — PROGRESS NOTES
Subjective   Christine Black is a 32 y.o. female.     Chief Complaint   Patient presents with   • Follow-up     Recent lab results   • Adrenal Problem     Imaging showed lesion        History of Present Illness   Patient here for follow-up. Blood test showed ALT elevated. Patient also complains acting needs medication. History of a ovarian lesion ascites needs to be followed up.    Current Outpatient Medications:   •  minocycline (MINOCIN,DYNACIN) 100 MG capsule, Take 1 capsule by mouth 2 (Two) Times a Day., Disp: 180 capsule, Rfl: 0  •  NON FORMULARY, Paraguard IUD, Disp: , Rfl:   •  rivaroxaban (XARELTO) 20 MG tablet, Take 1 tablet by mouth Daily., Disp: 90 tablet, Rfl: 3  •  spironolactone (ALDACTONE) 100 MG tablet, Take 1.5 tablets by mouth Daily., Disp: 45 tablet, Rfl: 3  •  tretinoin (RETIN-A) 0.025 % gel, Apply  topically to the appropriate area as directed Every Night., Disp: 20 g, Rfl: 0    The following portions of the patient's history were reviewed and updated as appropriate: allergies, current medications, past family history, past medical history, past social history, past surgical history and problem list.    Review of Systems   Constitutional: Negative.    Respiratory: Negative.    Cardiovascular: Negative.    Gastrointestinal: Negative.    Musculoskeletal: Negative.    Skin: Negative.    Neurological: Negative.    Psychiatric/Behavioral: Negative.        Objective   Physical Exam  Cardiovascular:      Rate and Rhythm: Normal rate and regular rhythm.      Heart sounds: Normal heart sounds.   Pulmonary:      Effort: Pulmonary effort is normal.      Breath sounds: Normal breath sounds.   Abdominal:      General: Bowel sounds are normal.   Musculoskeletal:      Cervical back: Neck supple.   Skin:     General: Skin is warm.   Neurological:      Mental Status: She is alert and oriented to person, place, and time.         All tests have been reviewed.    Assessment/Plan   Diagnoses and all orders for this  visit:    Abnormal liver enzymes  -     US Liver  -     Ferritin  -     FLIP  -     Ceruloplasmin  -     Mitochondrial Antibodies, M2  -     Anti-Smooth Muscle Antibody Titer  -     Hepatitis B Surface Antibody  -     Hepatitis B surface antigen  -     ALT    Acne, unspecified acne type  -     tretinoin (RETIN-A) 0.025 % gel; Apply  topically to the appropriate area as directed Every Night.          Other pulmonary embolism without acute cor pulmonale, unspecified chronicity (HCC) cont xarelto     Progression of deep vein thrombosis (DVT) (HCC)     History of blood clots    ALT elevated      Ovarian lesion repeat US      Chronic deep vein thrombosis (DVT) of right iliac vein (HCC)     Sustained SVT (HCC) stable without med seen by cardio     Other acne cont med     IUD (intrauterine device) in place follow gyn     Annual phys   Answers for HPI/ROS submitted by the patient on 5/8/2022  Please describe your symptoms.: None  Have you had these symptoms before?: No  How long have you been having these symptoms?: 1-4 days  Please list any medications you are currently taking for this condition.: Xarelto, Spironolactone, Minocycline, Retina  What is the primary reason for your visit?: Other

## 2022-05-10 LAB
ALT SERPL-CCNC: 12 U/L (ref 1–33)
ANA SER QL: NEGATIVE
CERULOPLASMIN SERPL-MCNC: 25.2 MG/DL (ref 19–39)
FERRITIN SERPL-MCNC: 7.05 NG/ML (ref 13–150)
HBV SURFACE AB SER QL: NON REACTIVE
HBV SURFACE AG SERPL QL IA: NEGATIVE
MITOCHONDRIA M2 IGG SER-ACNC: <20 UNITS (ref 0–20)
SMA IGG SER-ACNC: 7 UNITS (ref 0–19)

## 2022-05-11 ENCOUNTER — HOSPITAL ENCOUNTER (OUTPATIENT)
Dept: ULTRASOUND IMAGING | Facility: HOSPITAL | Age: 32
Discharge: HOME | End: 2022-05-11
Admitting: INTERNAL MEDICINE

## 2022-05-11 DIAGNOSIS — R79.0 DECREASED FERRITIN: Primary | ICD-10-CM

## 2022-05-11 DIAGNOSIS — R74.01 ELEVATED AST (SGOT): Primary | ICD-10-CM

## 2022-05-11 DIAGNOSIS — R79.0 LOW FERRITIN: Primary | ICD-10-CM

## 2022-05-11 PROCEDURE — 76705 ECHO EXAM OF ABDOMEN: CPT

## 2022-05-14 LAB
AST SERPL-CCNC: 51 U/L (ref 1–32)
FERRITIN SERPL-MCNC: 7.15 NG/ML (ref 13–150)
IRON SATN MFR SERPL: 5 % (ref 20–50)
IRON SERPL-MCNC: 23 MCG/DL (ref 37–145)
TIBC SERPL-MCNC: 475 MCG/DL
UIBC SERPL-MCNC: 452 MCG/DL (ref 112–346)

## 2022-06-30 ENCOUNTER — OFFICE VISIT (OUTPATIENT)
Dept: INTERNAL MEDICINE | Facility: CLINIC | Age: 32
End: 2022-06-30

## 2022-06-30 VITALS
BODY MASS INDEX: 27.31 KG/M2 | HEIGHT: 67 IN | OXYGEN SATURATION: 99 % | WEIGHT: 174 LBS | DIASTOLIC BLOOD PRESSURE: 62 MMHG | TEMPERATURE: 97.1 F | SYSTOLIC BLOOD PRESSURE: 122 MMHG | HEART RATE: 73 BPM

## 2022-06-30 DIAGNOSIS — E61.1 IRON DEFICIENCY: ICD-10-CM

## 2022-06-30 DIAGNOSIS — I47.1 SUSTAINED SVT: Primary | Chronic | ICD-10-CM

## 2022-06-30 DIAGNOSIS — Z97.5 IUD (INTRAUTERINE DEVICE) IN PLACE: ICD-10-CM

## 2022-06-30 DIAGNOSIS — Z00.00 ANNUAL PHYSICAL EXAM: ICD-10-CM

## 2022-06-30 PROCEDURE — 99214 OFFICE O/P EST MOD 30 MIN: CPT | Performed by: INTERNAL MEDICINE

## 2022-06-30 NOTE — PROGRESS NOTES
Subjective   Christine Black is a 32 y.o. female.     Chief Complaint   Patient presents with   • Follow-up     Lab result       History of Present Illness   Patient here for follow-up. Liver enzymes still elevated patient denies alcohol use. Autoimmune disease labs unremarkable. Acne improved after medication PE on medication stable patient yet to do ultrasound for pelvis blood test showed iron deficiency patient has heavy menstrual.    Current Outpatient Medications:   •  minocycline (MINOCIN,DYNACIN) 100 MG capsule, Take 1 capsule by mouth 2 (Two) Times a Day., Disp: 180 capsule, Rfl: 0  •  NON FORMULARY, Paraguard IUD, Disp: , Rfl:   •  rivaroxaban (XARELTO) 20 MG tablet, Take 1 tablet by mouth Daily., Disp: 90 tablet, Rfl: 3  •  spironolactone (ALDACTONE) 100 MG tablet, Take 1.5 tablets by mouth Daily., Disp: 45 tablet, Rfl: 3  •  tretinoin (RETIN-A) 0.025 % gel, Apply  topically to the appropriate area as directed Every Night., Disp: 20 g, Rfl: 0    The following portions of the patient's history were reviewed and updated as appropriate: allergies, current medications, past family history, past medical history, past social history, past surgical history and problem list.    Review of Systems   Constitutional: Negative.    Respiratory: Negative.    Cardiovascular: Negative.    Gastrointestinal: Negative.    Musculoskeletal: Negative.    Skin: Negative.    Neurological: Negative.    Psychiatric/Behavioral: Negative.        Objective   Physical Exam  Cardiovascular:      Rate and Rhythm: Normal rate and regular rhythm.      Heart sounds: Normal heart sounds.   Pulmonary:      Effort: Pulmonary effort is normal.      Breath sounds: Normal breath sounds.   Abdominal:      General: Bowel sounds are normal.   Musculoskeletal:      Cervical back: Neck supple.   Skin:     General: Skin is warm.   Neurological:      Mental Status: She is alert and oriented to person, place, and time.         All tests have been  reviewed.    Assessment & Plan   Diagnoses and all orders for this visit:           Abnormal liver enzymes work up lab normal, US normal patient denies any significant alcohol use     Acne, improve on med  -     tretinoin (RETIN-A) 0.025 % gel; Apply  topically to the appropriate area as directed Every Night.  Cont med      Other pulmonary embolism without acute cor pulmonale, unspecified chronicity (HCC) cont xarelto     Progression of deep vein thrombosis (DVT) (HCC) right iliac vein (HCC)   follow [facility]     Ovarian lesion repeat US      Chronic deep vein thrombosis (DVT) of   Sustained SVT (HCC) stable without med seen by cardio     IUD (intrauterine device) in place follow gyn    Iron def ,start supplement due to heavy mense. Follow gyn     3 mo PE after labs            Answers for HPI/ROS submitted by the patient on 6/30/2022  Please describe your symptoms.: None  Have you had these symptoms before?: No  How long have you been having these symptoms?: 1-4 days  What is the primary reason for your visit?: Other

## 2022-07-08 NOTE — TELEPHONE ENCOUNTER
Rx Refill Note  Requested Prescriptions     Pending Prescriptions Disp Refills   • minocycline (MINOCIN,DYNACIN) 100 MG capsule 180 capsule 0     Sig: Take 1 capsule by mouth 2 (Two) Times a Day.      Last office visit with prescribing clinician: 6/30/2022      Next office visit with prescribing clinician: 1/9/2023            Kizzy Ramesh LPN  07/08/22, 17:36 EDT

## 2022-07-10 RX ORDER — MINOCYCLINE HYDROCHLORIDE 100 MG/1
100 CAPSULE ORAL 2 TIMES DAILY
Qty: 180 CAPSULE | Refills: 0 | Status: SHIPPED | OUTPATIENT
Start: 2022-07-10 | End: 2022-11-15 | Stop reason: SDUPTHER

## 2022-08-11 RX ORDER — SPIRONOLACTONE 100 MG/1
150 TABLET, FILM COATED ORAL DAILY
Qty: 45 TABLET | Refills: 3 | Status: SHIPPED | OUTPATIENT
Start: 2022-08-11 | End: 2023-01-17

## 2022-08-11 NOTE — TELEPHONE ENCOUNTER
Rx Refill Note  Requested Prescriptions     Pending Prescriptions Disp Refills   • spironolactone (ALDACTONE) 100 MG tablet 45 tablet 3     Sig: Take 1.5 tablets by mouth Daily.      Last office visit with prescribing clinician: 6/30/2022      Next office visit with prescribing clinician: 1/9/2023            Silvia Ramesh MA  08/11/22, 08:43 EDT

## 2022-11-09 RX ORDER — MINOCYCLINE HYDROCHLORIDE 100 MG/1
100 CAPSULE ORAL 2 TIMES DAILY
Qty: 180 CAPSULE | Refills: 0 | OUTPATIENT
Start: 2022-11-09

## 2022-11-09 NOTE — TELEPHONE ENCOUNTER
Rx Refill Note  Requested Prescriptions     Pending Prescriptions Disp Refills   • minocycline (MINOCIN,DYNACIN) 100 MG capsule [Pharmacy Med Name: MINOCYCLINE 100 MG CAPSULE] 180 capsule 0     Sig: TAKE ONE CAPSULE BY MOUTH TWICE A DAY      Last office visit with prescribing clinician: 6/30/2022      Next office visit with prescribing clinician: 11/9/2022            Silvia Ramesh MA  11/09/22, 14:43 EST

## 2022-11-11 NOTE — TELEPHONE ENCOUNTER
PATIENT CALLING BACK ABOUT MISSING A PHONE CALL. ATTEMPTED TO WARM TRANSFER TO FRONT OFFICE. PATIENT IS STILL TAKING THIS MEDICATION.

## 2022-11-11 NOTE — TELEPHONE ENCOUNTER
Left detailed voice mail requesting a call back to let us know if she is currently taking this medication

## 2022-11-15 RX ORDER — MINOCYCLINE HYDROCHLORIDE 100 MG/1
100 CAPSULE ORAL 2 TIMES DAILY
Qty: 180 CAPSULE | Refills: 0 | Status: SHIPPED | OUTPATIENT
Start: 2022-11-15 | End: 2023-02-09

## 2022-11-15 NOTE — TELEPHONE ENCOUNTER
I spoke to Christine - Dr. Urban prescribed minocycline in July for acne. She has labs ordered to be done before her appointment in January.     I discussed with a provider in office - we are sending enough medication to last until her follow up appointment in [place]

## 2022-11-29 RX ORDER — MINOCYCLINE HYDROCHLORIDE 100 MG/1
CAPSULE ORAL
Qty: 180 CAPSULE | Refills: 0 | OUTPATIENT
Start: 2022-11-29

## 2023-01-17 RX ORDER — SPIRONOLACTONE 100 MG/1
TABLET, FILM COATED ORAL
Qty: 45 TABLET | Refills: 3 | Status: SHIPPED | OUTPATIENT
Start: 2023-01-17

## 2023-01-17 NOTE — TELEPHONE ENCOUNTER
Rx Refill Note  Requested Prescriptions     Pending Prescriptions Disp Refills   • spironolactone (ALDACTONE) 100 MG tablet [Pharmacy Med Name: SPIRONOLACTONE 100 MG TABLET] 45 tablet 3     Sig: TAKE ONE AND ONE-HALF TABLETS  BY MOUTH DAILY      Last office visit with prescribing clinician: 6/30/2022   Last telemedicine visit with prescribing clinician: 2/16/2023   Next office visit with prescribing clinician: 2/16/2023                         Would you like a call back once the refill request has been completed: [] Yes [] No    If the office needs to give you a call back, can they leave a voicemail: [] Yes [] No    Kizzy Ramesh LPN  01/17/23, 10:15 EST

## 2023-01-25 ENCOUNTER — OFFICE VISIT (OUTPATIENT)
Dept: OBSTETRICS AND GYNECOLOGY | Facility: CLINIC | Age: 33
End: 2023-01-25
Payer: COMMERCIAL

## 2023-01-25 VITALS — BODY MASS INDEX: 29.72 KG/M2 | SYSTOLIC BLOOD PRESSURE: 110 MMHG | WEIGHT: 189.8 LBS | DIASTOLIC BLOOD PRESSURE: 62 MMHG

## 2023-01-25 DIAGNOSIS — Z01.419 ENCOUNTER FOR GYNECOLOGICAL EXAMINATION WITHOUT ABNORMAL FINDING: Primary | ICD-10-CM

## 2023-01-25 PROCEDURE — 99395 PREV VISIT EST AGE 18-39: CPT | Performed by: OBSTETRICS & GYNECOLOGY

## 2023-01-25 NOTE — PROGRESS NOTES
Subjective   Chief Complaint   Patient presents with   • Gynecologic Exam     Yearly exam and pap smear     Christine Black is a 33 y.o. year old .  No LMP recorded. Patient has had an implant.  She presents to be seen because of annual exam.  Patient lifelong anticoagulation secondary to DVT.  Menses are heavy and irregular.  History of iron deficient anemia.  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    OTHER COMPLAINTS:  Nothing else    The following portions of the patient's history were reviewed and updated as appropriate:  She  has a past medical history of Clotting disorder (HCC) (3/7/2020), Deep vein thrombosis (HCC) (3/7/2020), Family history of blood clots, History of blood clots, Ovarian cyst, and Pulmonary embolism (HCC) (2020).  She does not have any pertinent problems on file.  She  has a past surgical history that includes Cardiac catheterization (Right, 3/9/2020) and Other surgical history.  Her family history includes Breast cancer in her paternal aunt and paternal aunt.  She  reports that she has never smoked. She has never used smokeless tobacco. She reports current alcohol use of about 1.0 standard drink per week. She reports that she does not use drugs.  Current Outpatient Medications   Medication Sig Dispense Refill   • minocycline (MINOCIN,DYNACIN) 100 MG capsule Take 1 capsule by mouth 2 (Two) Times a Day. 180 capsule 0   • NON FORMULARY Paraguard IUD     • rivaroxaban (XARELTO) 20 MG tablet Take 1 tablet by mouth Daily. 90 tablet 3   • spironolactone (ALDACTONE) 100 MG tablet TAKE ONE AND ONE-HALF TABLETS  BY MOUTH DAILY 45 tablet 3   • tretinoin (RETIN-A) 0.025 % gel Apply  topically to the appropriate area as directed Every Night. 20 g 0   • Iron, Ferrous Gluconate, 256 (28 Fe) MG tablet 1 daily po 30 tablet 2     No current facility-administered medications for this visit.     Current Outpatient Medications on File Prior to Visit   Medication  Sig   • minocycline (MINOCIN,DYNACIN) 100 MG capsule Take 1 capsule by mouth 2 (Two) Times a Day.   • NON FORMULARY Paraguard IUD   • rivaroxaban (XARELTO) 20 MG tablet Take 1 tablet by mouth Daily.   • spironolactone (ALDACTONE) 100 MG tablet TAKE ONE AND ONE-HALF TABLETS  BY MOUTH DAILY   • tretinoin (RETIN-A) 0.025 % gel Apply  topically to the appropriate area as directed Every Night.   • Iron, Ferrous Gluconate, 256 (28 Fe) MG tablet 1 daily po     No current facility-administered medications on file prior to visit.     She has No Known Allergies.    Social History    Tobacco Use      Smoking status: Never      Smokeless tobacco: Never    Review of Systems  Consitutional POS: nothing reported    NEG: anorexia or night sweats   Gastointestinal POS: nothing reported    NEG: bloating, change in bowel habits, melena or reflux symptoms   Genitourinary POS: nothing reported    NEG: dysuria or hematuria   Integument POS: nothing reported    NEG: moles that are changing in size, shape, color or rashes   Breast POS: nothing reported    NEG: persistent breast lump, skin dimpling or nipple discharge         Respiratory: negative  Cardiovascular: negative          Objective   /62   Wt 86.1 kg (189 lb 12.8 oz)   BMI 29.72 kg/m²     General:  well developed; well nourished  no acute distress   Skin:  No suspicious lesions seen   Thyroid: normal to inspection and palpation   Lungs:  breathing is unlabored  clear to auscultation bilaterally   Heart:  regular rate and rhythm, S1, S2 normal, no murmur, click, rub or gallop   Breasts:  Examined in supine position  Symmetric without masses or skin dimpling  Nipples normal without inversion, lesions or discharge  There are no palpable axillary nodes   Abdomen: soft, non-tender; no masses  no umbilical or inguinal hernias are present  no hepato-splenomegaly   Pelvis: Clinical staff was present for exam  External genitalia:  normal appearance of the external genitalia  including Bartholin's and [place]'s glands.  :  urethral meatus normal;  Vaginal:  normal pink mucosa without prolapse or lesions.  Cervix:  normal appearance. IUD string present - 1.5 cms in length;  Uterus:  normal size, shape and consistency.  Adnexa:  normal bimanual exam of the adnexa.  Rectal:  digital rectal exam not performed; anus visually normal appearing.     Psychiatric: Alert and oriented ×3, mood and affect appropriate  HEENT: Atraumatic, normocephalic, normal scleral icterus  Extremities: 2+ pulses bilaterally, no edema      Lab Review   No data reviewed    Imaging   No data reviewed        Assessment   1. Physical exam within normal limits  2. For DVT with lifelong Anticoagulation and resultant menorrhagia     Plan   1. Pap smear done, Mirena ordered.  Placed with next menses.  CBC today.  2. Diet/exercise    No orders of the defined types were placed in this encounter.         This note was electronically signed.      January 25, 2023

## 2023-01-26 LAB
BASOPHILS # BLD AUTO: 0.03 10*3/MM3 (ref 0–0.2)
BASOPHILS NFR BLD AUTO: 0.6 % (ref 0–1.5)
EOSINOPHIL # BLD AUTO: 0.09 10*3/MM3 (ref 0–0.4)
EOSINOPHIL NFR BLD AUTO: 1.7 % (ref 0.3–6.2)
ERYTHROCYTE [DISTWIDTH] IN BLOOD BY AUTOMATED COUNT: 15.1 % (ref 12.3–15.4)
HCT VFR BLD AUTO: 45.1 % (ref 34–46.6)
HGB BLD-MCNC: 14.6 G/DL (ref 12–15.9)
IMM GRANULOCYTES # BLD AUTO: 0.01 10*3/MM3 (ref 0–0.05)
IMM GRANULOCYTES NFR BLD AUTO: 0.2 % (ref 0–0.5)
LYMPHOCYTES # BLD AUTO: 1.35 10*3/MM3 (ref 0.7–3.1)
LYMPHOCYTES NFR BLD AUTO: 26.1 % (ref 19.6–45.3)
MCH RBC QN AUTO: 27.2 PG (ref 26.6–33)
MCHC RBC AUTO-ENTMCNC: 32.4 G/DL (ref 31.5–35.7)
MCV RBC AUTO: 84.1 FL (ref 79–97)
MONOCYTES # BLD AUTO: 1.02 10*3/MM3 (ref 0.1–0.9)
MONOCYTES NFR BLD AUTO: 19.7 % (ref 5–12)
NEUTROPHILS # BLD AUTO: 2.68 10*3/MM3 (ref 1.7–7)
NEUTROPHILS NFR BLD AUTO: 51.7 % (ref 42.7–76)
NRBC BLD AUTO-RTO: 0 /100 WBC (ref 0–0.2)
PLATELET # BLD AUTO: 245 10*3/MM3 (ref 140–450)
RBC # BLD AUTO: 5.36 10*6/MM3 (ref 3.77–5.28)
WBC # BLD AUTO: 5.18 10*3/MM3 (ref 3.4–10.8)

## 2023-02-01 LAB — REF LAB TEST METHOD: NORMAL

## 2023-02-02 ENCOUNTER — OFFICE VISIT (OUTPATIENT)
Dept: OBSTETRICS AND GYNECOLOGY | Facility: CLINIC | Age: 33
End: 2023-02-02
Payer: COMMERCIAL

## 2023-02-02 VITALS — WEIGHT: 187.4 LBS | SYSTOLIC BLOOD PRESSURE: 104 MMHG | DIASTOLIC BLOOD PRESSURE: 60 MMHG | BODY MASS INDEX: 29.34 KG/M2

## 2023-02-02 DIAGNOSIS — Z30.433 ENCOUNTER FOR IUD REMOVAL AND REINSERTION: Primary | ICD-10-CM

## 2023-02-02 PROCEDURE — 58301 REMOVE INTRAUTERINE DEVICE: CPT | Performed by: OBSTETRICS & GYNECOLOGY

## 2023-02-02 PROCEDURE — 58300 INSERT INTRAUTERINE DEVICE: CPT | Performed by: OBSTETRICS & GYNECOLOGY

## 2023-02-02 NOTE — PROGRESS NOTES
IUD Removal and Immediate Reinsertion    No LMP recorded. Patient has had an implant.    Date of procedure:  2/2/2023    Risks and benefits discussed? yes  All questions answered? yes  Consents given by the patient  Written consent obtained? yes  Reason for removal: Device expiration    Local anesthesia used:  no    Procedure documentation:    A speculum was placed in order to view the cervix.  A tenaculum did need to be placed on the anterior cervical lip.  Cervical dilation did not need to be performed in order to access the string.  The IUD string was easily seen.  The string was grasped and the IUD was removed without difficulty.  The IUD did not appear to be adherent to the uterine cavity. It was removed intact.    A sterile speculum was replaced and the cervix was cleansed with an antiseptic solution.  Vaginal discharge was scant.  The anterior lip of the cervix was grasped with a tenaculum and the uterine cavity was gently sounded.  There was no difficulty passing the sound through the cervix.  Cervical dilation did not need to be performed prior to placing the IUD.  The uterus was anteverted and sounded to 7 cms.  The Mirena was then prepared per the manufacturers instructions.    The Mirena was advanced to a point 2 cms from the fundus and then the arms were released from the sheath.  The device was advanced to the fundus and the device was released fully from the sheath.. The string was cut 2 cms in length.  Bleeding from the cervix was scant.    She tolerated the procedure without any difficulty.     Post procedure instructions: It was reviewed that the Mirena will not alter the timing of when she bleeds but it may decrease the quantity of flow and cramps.  Roughly 30% of people will be amenorrheic over time.  Efficacy rate of 99.2% over 8 years was discussed.  Spontaneous expulsion rate of 1-2% was also discussed.  If she has any issue with fever or excessive bleeding or pain she is to call the office  immediately.  Otherwise I would like to see her back in 6 weeks with an ultrasound to confirm correct placement.    This note was electronically signed.    Gavin Kilgore MD

## 2023-02-09 RX ORDER — MINOCYCLINE HYDROCHLORIDE 100 MG/1
CAPSULE ORAL
Qty: 180 CAPSULE | Refills: 0 | Status: SHIPPED | OUTPATIENT
Start: 2023-02-09

## 2023-02-09 RX ORDER — RIVAROXABAN 20 MG/1
TABLET, FILM COATED ORAL
Qty: 90 TABLET | Refills: 3 | Status: SHIPPED | OUTPATIENT
Start: 2023-02-09

## 2023-02-09 NOTE — TELEPHONE ENCOUNTER
Rx Refill Note  Requested Prescriptions     Pending Prescriptions Disp Refills   • Xarelto 20 MG tablet [Pharmacy Med Name: XARELTO 20 MG TABLET] 30 tablet      Sig: TAKE ONE TABLET BY MOUTH DAILY   • minocycline (MINOCIN,DYNACIN) 100 MG capsule [Pharmacy Med Name: MINOCYCLINE 100 MG CAPSULE] 180 capsule 0     Sig: TAKE ONE CAPSULE BY MOUTH TWICE A DAY      Last office visit with prescribing clinician: 6/30/2022   Last telemedicine visit with prescribing clinician:   Next office visit with prescribing clinician: 2/16/2023       Faith Lloyd MA  02/09/23, 09:13 EST

## 2023-02-14 ENCOUNTER — APPOINTMENT (OUTPATIENT)
Dept: GENERAL RADIOLOGY | Facility: HOSPITAL | Age: 33
End: 2023-02-14
Payer: COMMERCIAL

## 2023-02-14 ENCOUNTER — HOSPITAL ENCOUNTER (EMERGENCY)
Facility: HOSPITAL | Age: 33
Discharge: HOME | End: 2023-02-14
Attending: EMERGENCY MEDICINE | Admitting: EMERGENCY MEDICINE
Payer: COMMERCIAL

## 2023-02-14 VITALS
OXYGEN SATURATION: 100 % | SYSTOLIC BLOOD PRESSURE: 116 MMHG | HEIGHT: 67 IN | HEART RATE: 64 BPM | WEIGHT: 170 LBS | DIASTOLIC BLOOD PRESSURE: 67 MMHG | TEMPERATURE: 98.4 F | RESPIRATION RATE: 18 BRPM | BODY MASS INDEX: 26.68 KG/M2

## 2023-02-14 DIAGNOSIS — M25.512 LEFT SHOULDER PAIN, UNSPECIFIED CHRONICITY: Primary | ICD-10-CM

## 2023-02-14 LAB
ALBUMIN SERPL-MCNC: 4.4 G/DL (ref 3.5–5.2)
ALBUMIN/GLOB SERPL: 1.4 G/DL
ALP SERPL-CCNC: 70 U/L (ref 39–117)
ALT SERPL W P-5'-P-CCNC: 14 U/L (ref 1–33)
ANION GAP SERPL CALCULATED.3IONS-SCNC: 13.2 MMOL/L (ref 5–15)
AST SERPL-CCNC: 60 U/L (ref 1–32)
BASOPHILS # BLD AUTO: 0.03 10*3/MM3 (ref 0–0.2)
BASOPHILS NFR BLD AUTO: 0.5 % (ref 0–1.5)
BILIRUB SERPL-MCNC: 1.3 MG/DL (ref 0–1.2)
BUN SERPL-MCNC: 14 MG/DL (ref 6–20)
BUN/CREAT SERPL: 15.6 (ref 7–25)
CALCIUM SPEC-SCNC: 9.9 MG/DL (ref 8.6–10.5)
CHLORIDE SERPL-SCNC: 104 MMOL/L (ref 98–107)
CO2 SERPL-SCNC: 23.8 MMOL/L (ref 22–29)
CREAT SERPL-MCNC: 0.9 MG/DL (ref 0.57–1)
D DIMER PPP FEU-MCNC: <0.27 MCGFEU/ML (ref 0–0.5)
DEPRECATED RDW RBC AUTO: 50.1 FL (ref 37–54)
EGFRCR SERPLBLD CKD-EPI 2021: 87.8 ML/MIN/1.73
EOSINOPHIL # BLD AUTO: 0.02 10*3/MM3 (ref 0–0.4)
EOSINOPHIL NFR BLD AUTO: 0.4 % (ref 0.3–6.2)
ERYTHROCYTE [DISTWIDTH] IN BLOOD BY AUTOMATED COUNT: 16.6 % (ref 12.3–15.4)
GLOBULIN UR ELPH-MCNC: 3.2 GM/DL
GLUCOSE SERPL-MCNC: 85 MG/DL (ref 65–99)
HCT VFR BLD AUTO: 45.7 % (ref 34–46.6)
HGB BLD-MCNC: 14.8 G/DL (ref 12–15.9)
IMM GRANULOCYTES # BLD AUTO: 0.01 10*3/MM3 (ref 0–0.05)
IMM GRANULOCYTES NFR BLD AUTO: 0.2 % (ref 0–0.5)
LYMPHOCYTES # BLD AUTO: 1.44 10*3/MM3 (ref 0.7–3.1)
LYMPHOCYTES NFR BLD AUTO: 25.4 % (ref 19.6–45.3)
MCH RBC QN AUTO: 27 PG (ref 26.6–33)
MCHC RBC AUTO-ENTMCNC: 32.4 G/DL (ref 31.5–35.7)
MCV RBC AUTO: 83.4 FL (ref 79–97)
MONOCYTES # BLD AUTO: 0.46 10*3/MM3 (ref 0.1–0.9)
MONOCYTES NFR BLD AUTO: 8.1 % (ref 5–12)
NEUTROPHILS NFR BLD AUTO: 3.72 10*3/MM3 (ref 1.7–7)
NEUTROPHILS NFR BLD AUTO: 65.4 % (ref 42.7–76)
NRBC BLD AUTO-RTO: 0 /100 WBC (ref 0–0.2)
PLATELET # BLD AUTO: 248 10*3/MM3 (ref 140–450)
PMV BLD AUTO: 9 FL (ref 6–12)
POTASSIUM SERPL-SCNC: 4.1 MMOL/L (ref 3.5–5.2)
PROT SERPL-MCNC: 7.6 G/DL (ref 6–8.5)
RBC # BLD AUTO: 5.48 10*6/MM3 (ref 3.77–5.28)
SODIUM SERPL-SCNC: 141 MMOL/L (ref 136–145)
TROPONIN T SERPL HS-MCNC: <6 NG/L
WBC NRBC COR # BLD: 5.68 10*3/MM3 (ref 3.4–10.8)

## 2023-02-14 PROCEDURE — 71045 X-RAY EXAM CHEST 1 VIEW: CPT

## 2023-02-14 PROCEDURE — 73030 X-RAY EXAM OF SHOULDER: CPT

## 2023-02-14 PROCEDURE — 85379 FIBRIN DEGRADATION QUANT: CPT

## 2023-02-14 PROCEDURE — 85025 COMPLETE CBC W/AUTO DIFF WBC: CPT

## 2023-02-14 PROCEDURE — 99283 EMERGENCY DEPT VISIT LOW MDM: CPT

## 2023-02-14 PROCEDURE — 80053 COMPREHEN METABOLIC PANEL: CPT

## 2023-02-14 PROCEDURE — 84484 ASSAY OF TROPONIN QUANT: CPT

## 2023-02-14 PROCEDURE — 93005 ELECTROCARDIOGRAM TRACING: CPT

## 2023-02-14 PROCEDURE — 99282 EMERGENCY DEPT VISIT SF MDM: CPT

## 2023-02-14 PROCEDURE — 36415 COLL VENOUS BLD VENIPUNCTURE: CPT

## 2023-02-14 RX ORDER — LIDOCAINE 50 MG/G
1 PATCH TOPICAL EVERY 24 HOURS
Qty: 15 PATCH | Refills: 0 | Status: SHIPPED | OUTPATIENT
Start: 2023-02-14

## 2023-02-14 RX ORDER — ACETAMINOPHEN 325 MG/1
975 TABLET ORAL ONCE
Status: COMPLETED | OUTPATIENT
Start: 2023-02-14 | End: 2023-02-14

## 2023-02-14 RX ADMIN — ACETAMINOPHEN 975 MG: 325 TABLET, FILM COATED ORAL at 13:12

## 2023-02-14 NOTE — ED PROVIDER NOTES
Subjective  History of Present Illness:    This is a 33-year-old female presents emergency room today for chief complaint of left shoulder pain.  Patient reports onset of left shoulder pain was this morning.  Reports that she went to the gym and the left shoulder pain began shortly after that.  No known injuries or falls or trauma.  Patient does have a history of a clotting disorder and DVT in addition to PE, on Xarelto once daily at 20 mg, has been faithful to taking her medications as she reports.  Additionally reported a sharp pain that lasted around 1 hour under the right breast this morning.  She reports that her shoulder pain on the posterior aspect of her left shoulder feels like a deep pain that is worse with movement and inspiration.  Denies any chest pain or shortness of air.  Does not report any unilateral leg swelling.  She has not had any medications for pain prior to arrival.        Nurses Notes reviewed and agree, including vitals, allergies, social history and prior medical history.     REVIEW OF SYSTEMS: All systems reviewed and not pertinent unless noted.  Review of Systems   Respiratory: Negative for shortness of breath.    Cardiovascular: Negative for chest pain.   Musculoskeletal:        Reports left shoulder pain.  Reports pain under the right breast, close to axillary line.   All other systems reviewed and are negative.      Past Medical History:   Diagnosis Date   • Clotting disorder (HCC) 3/7/2020    Blood clot in leg and lung. Removal of blood clot in leg   • Deep vein thrombosis (HCC) 3/7/2020   • Family history of blood clots    • History of blood clots    • Ovarian cyst    • Pulmonary embolism (HCC) 03/07/2020    Blood clots in right leg and lungs       Allergies:    Patient has no known allergies.      Past Surgical History:   Procedure Laterality Date   • CARDIAC CATHETERIZATION Right 3/9/2020    Procedure: Peripheral angiography;  Surgeon: Margarito Yan MD;  Location:   "[facility];  Service: Cardiovascular;  Laterality: Right;  Prone/ R. Popliteal Access   • OTHER SURGICAL HISTORY      Blood clot removal- Right Leg           Social History     Socioeconomic History   • Marital status:    Tobacco Use   • Smoking status: Never   • Smokeless tobacco: Never   Vaping Use   • Vaping Use: Never used   Substance and Sexual Activity   • Alcohol use: Yes     Alcohol/week: 1.0 standard drink     Types: 1 Glasses of wine per week   • Drug use: Never   • Sexual activity: Yes     Partners: Male     Birth control/protection: I.U.D.         Family History   Problem Relation Age of Onset   • Breast cancer Paternal Aunt    • Breast cancer Paternal Aunt        Objective  Physical Exam:  /69   Pulse 68   Temp 98.4 °F (36.9 °C) (Oral)   Resp 18   Ht 170.2 cm (67\")   Wt 77.1 kg (170 lb)   LMP 01/17/2023 (Approximate)   SpO2 100%   BMI 26.63 kg/m²      Physical Exam  Vitals and nursing note reviewed.   Constitutional:       General: She is not in acute distress.     Appearance: Normal appearance. She is normal weight. She is not ill-appearing, toxic-appearing or diaphoretic.   HENT:      Head: Normocephalic and atraumatic.      Nose: Nose normal. No congestion or rhinorrhea.      Mouth/Throat:      Pharynx: Oropharynx is clear.   Eyes:      Extraocular Movements: Extraocular movements intact.   Cardiovascular:      Rate and Rhythm: Normal rate and regular rhythm.      Pulses: Normal pulses.      Heart sounds: Normal heart sounds.   Pulmonary:      Effort: Pulmonary effort is normal. No respiratory distress.      Breath sounds: Normal breath sounds. No stridor. No wheezing, rhonchi or rales.   Chest:      Chest wall: No tenderness.   Abdominal:      Palpations: Abdomen is soft.   Musculoskeletal:         General: No swelling. Normal range of motion.      Cervical back: Normal range of motion and neck supple.      Comments: There is some tenderness palpated over " the lower left posterior shoulder.  No overlying skin changes or signs of trauma.   Skin:     General: Skin is warm and dry.      Capillary Refill: Capillary refill takes less than 2 seconds.   Neurological:      General: No focal deficit present.      Mental Status: She is alert and oriented to person, place, and time.   Psychiatric:         Mood and Affect: Mood normal.         Behavior: Behavior normal.         Thought Content: Thought content normal.         Judgment: Judgment normal.           Procedures    ED Course:    ED Course as of 02/14/23 1426   Tue Feb 14, 2023   1315 EKG interpreted by me.  Sinus rhythm.  Bradycardic.  Rate of 59.  No obvious ST or T wave changes.  Normal EKG. [CG]   1415 Given Tylenol for pain.  Work-up here is negative today.  We will follow-up with primary care.  Return precautions given. [JR]   1416 With negative D-dimer, do not believe that CT PE is warranted especially given faithfulness to anticoagulation. [JR]      ED Course User Index  [CG] Elliott Nielsen,   [JR] Willi Darby, HAMZAH       Lab Results (last 24 hours)     Procedure Component Value Units Date/Time    CBC Auto Differential [[id number]]  (Abnormal) Collected: 02/14/23 1319    Specimen: Blood Updated: 02/14/23 1326     WBC 5.68 10*3/mm3      RBC 5.48 10*6/mm3      Hemoglobin 14.8 g/dL      Hematocrit 45.7 %      MCV 83.4 fL      MCH 27.0 pg      MCHC 32.4 g/dL      RDW 16.6 %      RDW-SD 50.1 fl      MPV 9.0 fL      Platelets 248 10*3/mm3      Neutrophil % 65.4 %      Lymphocyte % 25.4 %      Monocyte % 8.1 %      Eosinophil % 0.4 %      Basophil % 0.5 %      Immature Grans % 0.2 %      Neutrophils, Absolute 3.72 10*3/mm3      Lymphocytes, Absolute 1.44 10*3/mm3      Monocytes, Absolute 0.46 10*3/mm3      Eosinophils, Absolute 0.02 10*3/mm3      Basophils, Absolute 0.03 10*3/mm3      Immature Grans, Absolute 0.01 10*3/mm3      nRBC 0.0 /100 WBC     Comprehensive Metabolic Panel [[id number]]  (Abnormal)  "Collected: 02/14/23 1319    Specimen: Blood Updated: 02/14/23 1341     Glucose 85 mg/dL      BUN 14 mg/dL      Creatinine 0.90 mg/dL      Sodium 141 mmol/L      Potassium 4.1 mmol/L      Chloride 104 mmol/L      CO2 23.8 mmol/L      Calcium 9.9 mg/dL      Total Protein 7.6 g/dL      Albumin 4.4 g/dL      ALT (SGPT) 14 U/L      AST (SGOT) 60 U/L      Alkaline Phosphatase 70 U/L      Total Bilirubin 1.3 mg/dL      Globulin 3.2 gm/dL      A/G Ratio 1.4 g/dL      BUN/Creatinine Ratio 15.6     Anion Gap 13.2 mmol/L      eGFR 87.8 mL/min/1.73     Narrative:      GFR Normal >60  Chronic Kidney Disease <60  Kidney Failure <15      D-dimer, Quantitative [[id number]]  (Normal) Collected: 02/14/23 1319    Specimen: Blood Updated: 02/14/23 1359     D-Dimer, Quantitative <0.27 MCGFEU/mL     Narrative:      According to the assay 's published package insert, a normal (<0.50 MCGFEU/mL) D-dimer result in conjunction with a non-high clinical probability assessment, excludes deep vein thrombosis (DVT) and pulmonary embolism (PE) with high sensitivity.    D-dimer values increase with age and this can make VTE exclusion of an older population difficult. To address this, the [employer], based on best available evidence and recent guidelines, recommends that clinicians use age-adjusted D-dimer thresholds in patients greater than 50 years of age with: a) a low probability of PE who do not meet all Pulmonary Embolism Rule Out Criteria, or b) in those with intermediate probability of PE.   The formula for an age-adjusted D-dimer cut-off is \"age/100\".  For example, a 60 year old patient would have an age-adjusted cut-off of 0.60 MCGFEU/mL and an 80 year old 0.80 MCGFEU/mL.    High Sensitivity Troponin T [[id number]]  (Normal) Collected: 02/14/23 1319    Specimen: Blood Updated: 02/14/23 1343     HS Troponin T <6 ng/L     Narrative:      High Sensitive Troponin T Reference Range:  <10.0 ng/L- Negative Female for " AMI  <15.0 ng/L- Negative Male for AMI  >=10 - Abnormal Female indicating possible myocardial injury.  >=15 - Abnormal Male indicating possible myocardial injury.   Clinicians would have to utilize clinical acumen, EKG, Troponin, and serial changes to determine if it is an Acute Myocardial Infarction or myocardial injury due to an underlying chronic condition.                No radiology results from the last 24 hrs       MDM    Initial impression of presenting illness: This is a 33-year-old female presents emergency room today for chief complaint of left shoulder pain.  Patient reports onset of left shoulder pain was this morning.  Reports that she went to the gym and the left shoulder pain began shortly after that.  No known injuries or falls or trauma.  Patient does have a history of a clotting disorder and DVT in addition to PE, on Xarelto once daily at 20 mg, has been faithful to taking her medications as she reports.  Additionally reported a sharp pain that lasted around 1 hour under the right breast this morning.  She reports that her shoulder pain on the posterior aspect of her left shoulder feels like a deep pain that is worse with movement and inspiration.  Denies any chest pain or shortness of air.  Does not report any unilateral leg swelling.  She has not had any medications for pain prior to arrival.      DDX: includes but is not limited to: PE, DVT, ACS, musculoskeletal sprain/strain, costochondritis, osseous abnormality, pneumothroax.    Patient arrives hemodynamically stable, nontachycardic, afebrile, nonhypoxic, nontoxic-appearing with vitals interpreted by myself.     Pertinent features from physical exam: There is some tenderness palpated to the posterior aspect of the left shoulder, there is no overlying ecchymosis, bruising, or signs of trauma.  Patient has full range of motion.  Pain is somewhat worse with movement.  Lungs clear to auscultation bilaterally, cardiac auscultation regular rate and  rhythm.  No unilateral leg swelling noted or calf tenderness palpated.    Initial diagnostic plan: D-dimer, EKG, portable chest x-ray, EKG, CBC, CMP, left shoulder x-ray    Results from initial plan were reviewed and interpreted by me revealing CBC without abnormality.  EKG per attending interpretation with sinus rhythm rate of 59, no obvious ST or T wave changes, normal EKG per attending interpretation.  High sensitive troponin less than 6, within normal limits.  Nonspecific elevation in AST.  D-dimer within normal limits.  Plain films as interpreted by me with no acute cardiopulmonary process on 1 view of the chest, no acute fracture or dislocation on plain film of the left shoulder.    Diagnostic information from other sources: N/A    Interventions / Re-evaluation: Given Tylenol for pain.  Patient stable for discharge home at this time.    Results/clinical rationale were discussed with patient at bedside.  Given her faithfulness to her anticoagulation in conjunction with negative D-dimer, believe that PE DVT can be reasonably be accounted for much less suspicion especially in the absence of any chest pain, shortness of air, being nontachycardic, nonhypoxic.  Given that shoulder pain began after going to the gym and working out this morning much higher suspicion for musculoskeletal sprain/strain.  Heart score of 0, no current suspicion for ACS based on EKG and troponins and absence of chest pain.    Consultations/Discussion of results with other physicians: N/A    Disposition plan: Discharge home.  Follow-up with primary care. strict Return precautions discussed.  Patient agreeable plan at bedside.  Discharged home in good condition.  -----    Final diagnoses:   Left shoulder pain, unspecified chronicity        Willi Darby PA-C  02/14/23 1231

## 2023-02-14 NOTE — DISCHARGE INSTRUCTIONS
Return to emergency room for any worsening symptoms.  Have sent lidocaine patches to your pharmacy for further relief of what we believe is musculoskeletal pain.  Utilize Tylenol for further pain relief.  Apply ice and heat to the affected area as needed.  If utilizing heating pad, do not utilize for any more than 15 minutes to prevent burns to the affected area.  Suggest following back up with primary care for further evaluation in the next several days.

## 2023-05-01 RX ORDER — MINOCYCLINE HYDROCHLORIDE 100 MG/1
CAPSULE ORAL
Qty: 180 CAPSULE | Refills: 0 | Status: SHIPPED | OUTPATIENT
Start: 2023-05-01

## 2023-05-01 NOTE — TELEPHONE ENCOUNTER
Rx Refill Note  Requested Prescriptions     Pending Prescriptions Disp Refills   • minocycline (MINOCIN,DYNACIN) 100 MG capsule [Pharmacy Med Name: MINOCYCLINE 100 MG CAPSULE] 180 capsule 0     Sig: TAKE ONE CAPSULE BY MOUTH TWICE A DAY      Last office visit with prescribing clinician: 6/30/2022   Next office visit with prescribing clinician: 6/8/23   {    Faith Lloyd MA  05/01/23, 14:51 EDT

## 2023-05-08 ENCOUNTER — TELEPHONE (OUTPATIENT)
Dept: INTERNAL MEDICINE | Facility: CLINIC | Age: 33
End: 2023-05-08
Payer: COMMERCIAL

## 2023-05-08 NOTE — TELEPHONE ENCOUNTER
Patient did not complete US Pelvis Complete  ordered on 5/9/22 . This order is too old to be used and has been cancelled.

## 2023-08-16 RX ORDER — SPIRONOLACTONE 100 MG/1
TABLET, FILM COATED ORAL
Qty: 45 TABLET | Refills: 0 | OUTPATIENT
Start: 2023-08-16

## 2023-09-15 NOTE — TELEPHONE ENCOUNTER
Caller: Marjorie Black    Relationship: Self    Best call back number:  [phone]     Requested Prescriptions:   Requested Prescriptions      No prescriptions requested or ordered in this encounter    spironolactone (ALDACTONE) 100 MG tablet   minocycline (MINOCIN,DYNACIN) 100 MG capsule     Pharmacy where request should be sent: [facility] [id number] [address] AT [facility] [phone] [facility] [phone] [facility]     Last office visit with prescribing clinician: 6/30/2022   Last telemedicine visit with prescribing clinician: Visit date not found   Next office visit with prescribing clinician: Visit date not found     Additional details provided by patient: MARJORIE HAS A NEW PATIENT APPOINTMENT WITH [facility]DB[facility]G ON 10-6-23 AND WILL RUN OUT OF MEDICATION BEFORE THEN   SHE HAS 5 TABLETS LEFT     Does the patient have less than a 3 day supply:  [] Yes  [x] No    Would you like a call back once the refill request has been completed: [] Yes [x] No    If the office needs to give you a call back, can they leave a voicemail: [] Yes [x] No

## 2023-09-19 RX ORDER — MINOCYCLINE HYDROCHLORIDE 100 MG/1
100 CAPSULE ORAL 2 TIMES DAILY
Qty: 60 CAPSULE | Refills: 2 | Status: SHIPPED | OUTPATIENT
Start: 2023-09-19

## 2023-09-19 RX ORDER — SPIRONOLACTONE 100 MG/1
TABLET, FILM COATED ORAL
Qty: 45 TABLET | Refills: 0 | OUTPATIENT
Start: 2023-09-19

## 2023-09-19 RX ORDER — SPIRONOLACTONE 100 MG/1
150 TABLET, FILM COATED ORAL DAILY
Qty: 45 TABLET | Refills: 2 | Status: SHIPPED | OUTPATIENT
Start: 2023-09-19

## 2023-10-06 ENCOUNTER — OFFICE VISIT (OUTPATIENT)
Dept: INTERNAL MEDICINE | Facility: CLINIC | Age: 33
End: 2023-10-06
Payer: COMMERCIAL

## 2023-10-06 ENCOUNTER — TELEPHONE (OUTPATIENT)
Dept: INTERNAL MEDICINE | Facility: CLINIC | Age: 33
End: 2023-10-06

## 2023-10-06 VITALS
TEMPERATURE: 98.4 F | BODY MASS INDEX: 29.82 KG/M2 | HEART RATE: 75 BPM | OXYGEN SATURATION: 98 % | DIASTOLIC BLOOD PRESSURE: 66 MMHG | SYSTOLIC BLOOD PRESSURE: 112 MMHG | HEIGHT: 67 IN | WEIGHT: 190 LBS

## 2023-10-06 DIAGNOSIS — L70.9 ACNE, UNSPECIFIED ACNE TYPE: ICD-10-CM

## 2023-10-06 DIAGNOSIS — I82.521 CHRONIC DEEP VEIN THROMBOSIS (DVT) OF RIGHT ILIAC VEIN: ICD-10-CM

## 2023-10-06 DIAGNOSIS — N83.201 CYST OF RIGHT OVARY: ICD-10-CM

## 2023-10-06 DIAGNOSIS — R79.89 HIGH SERUM HIGH DENSITY LIPOPROTEIN (HDL): ICD-10-CM

## 2023-10-06 DIAGNOSIS — E61.1 IRON DEFICIENCY: ICD-10-CM

## 2023-10-06 DIAGNOSIS — Z13.29 SCREENING FOR ENDOCRINE, METABOLIC AND IMMUNITY DISORDER: ICD-10-CM

## 2023-10-06 DIAGNOSIS — Z13.228 SCREENING FOR ENDOCRINE, METABOLIC AND IMMUNITY DISORDER: ICD-10-CM

## 2023-10-06 DIAGNOSIS — Z86.718 HISTORY OF BLOOD CLOTS: Primary | ICD-10-CM

## 2023-10-06 DIAGNOSIS — Z13.0 SCREENING FOR ENDOCRINE, METABOLIC AND IMMUNITY DISORDER: ICD-10-CM

## 2023-10-06 NOTE — PROGRESS NOTES
"     Office Visit      Patient Name: Christine Black  : 1990   MRN: [id number]     Chief Complaint:    Chief Complaint   Patient presents with    Annual Exam       History of Present Illness: Christine Black is a 33 y.o. female who is here today to establish care with me, since her PCP (Dr Urban) retired earlier this year.  Requesting refills of medications.  Not UTD on labs.  Feels tired often.     Blood clots, diagnosed in 2020. H/O sustained SVT.  Takes xarelto as prescribed.       Acne.  Taking minocycyline 100 mg BID, spironolactone 100 mg .  Prescribed Retin-A gel, doesn't use it often as it causes significant tight skin and redness.    Goes to the gym every day.      Pap smear .  No h/o abnormal pap smear.   Mirena implant.  Right ovarian cyst, being monitored.      Subjective      I have reviewed and the following portions of the patient's history were updated as appropriate: past family history, past medical history, past social history, past surgical history and problem list.      Current Outpatient Medications:     Levonorgestrel (MIRENA, 52 MG, IU), by Intrauterine route., Disp: , Rfl:     minocycline (MINOCIN,DYNACIN) 100 MG capsule, Take 1 capsule by mouth 2 (Two) Times a Day., Disp: 60 capsule, Rfl: 2    spironolactone (ALDACTONE) 100 MG tablet, Take 1.5 tablets by mouth Daily., Disp: 45 tablet, Rfl: 2    tretinoin (RETIN-A) 0.025 % gel, Apply  topically to the appropriate area as directed Every Night., Disp: 20 g, Rfl: 0    Xarelto 20 MG tablet, TAKE ONE TABLET BY MOUTH DAILY, Disp: 90 tablet, Rfl: 3    No Known Allergies    Objective     Physical Exam:  Vital Signs:   Vitals:    10/06/23 1101   BP: 112/66   Pulse: 75   Temp: 98.4 °F (36.9 °C)   SpO2: 98%   Weight: 86.2 kg (190 lb)   Height: 170.2 cm (67.01\")     Body mass index is 29.75 kg/m².    Physical Exam  Constitutional:       Appearance: She is not ill-appearing.   HENT:      Head: Normocephalic.      Right Ear: External " ear normal.      Left Ear: External ear normal.   Eyes:      Conjunctiva/sclera: Conjunctivae normal.      Pupils: Pupils are equal, round, and reactive to light.   Cardiovascular:      Rate and Rhythm: Normal rate and regular rhythm.      Pulses:           Radial pulses are 2+ on the right side and 2+ on the left side.        Dorsalis pedis pulses are 2+ on the right side and 2+ on the left side.      Heart sounds: Normal heart sounds.   Pulmonary:      Effort: Pulmonary effort is normal.      Breath sounds: Normal breath sounds.   Musculoskeletal:      Cervical back: Normal range of motion and neck supple.      Right lower leg: No edema.      Left lower leg: No edema.   Skin:     General: Skin is warm.      Capillary Refill: Capillary refill takes less than 2 seconds.   Neurological:      Mental Status: She is alert and oriented to person, place, and time.      Coordination: Coordination normal.      Gait: Gait normal.   Psychiatric:         Mood and Affect: Mood normal.         Behavior: Behavior normal.         Thought Content: Thought content normal.             Assessment / Plan      Assessment/Plan:   Diagnoses and all orders for this visit:    1. History of blood clots (Primary)  -     Ambulatory Referral to Hematology  - Continue Xarelto    2. Chronic deep vein thrombosis (DVT) of right iliac vein  -     Ambulatory Referral to Hematology    3. Cyst of right ovary        - Follow up with gynecology as scheduled     4. Acne, unspecified acne type  -     Ambulatory Referral to Dermatology    5. Iron deficiency  -     Iron  -     Ferritin  -     CBC and Differential  -     Vitamin B12 and Folate    6. High serum high density lipoprotein (HDL)  -     Lipid Panel    7. Screening for endocrine, metabolic and immunity disorder  -     Comprehensive Metabolic Panel             Follow Up:   Return for Annual.    Patient was given instructions and counseling regarding her condition or for health maintenance advice.  Please see specific information pulled into the AVS if appropriate.       [facility] [address] [facility]     Please note that portions of this note may have been completed with a voice recognition program. Efforts were made to edit dictation, but occasionally words are mistranscribed.

## 2023-10-07 LAB
ALBUMIN SERPL-MCNC: 5.1 G/DL (ref 3.5–5.2)
ALBUMIN/GLOB SERPL: 2.1 G/DL
ALP SERPL-CCNC: 63 U/L (ref 39–117)
ALT SERPL-CCNC: 20 U/L (ref 1–33)
AST SERPL-CCNC: 75 U/L (ref 1–32)
BASOPHILS # BLD AUTO: 0.05 10*3/MM3 (ref 0–0.2)
BASOPHILS NFR BLD AUTO: 0.9 % (ref 0–1.5)
BILIRUB SERPL-MCNC: 1.7 MG/DL (ref 0–1.2)
BUN SERPL-MCNC: 15 MG/DL (ref 6–20)
BUN/CREAT SERPL: 16 (ref 7–25)
CALCIUM SERPL-MCNC: 10.4 MG/DL (ref 8.6–10.5)
CHLORIDE SERPL-SCNC: 102 MMOL/L (ref 98–107)
CHOLEST SERPL-MCNC: 203 MG/DL (ref 0–200)
CO2 SERPL-SCNC: 25.4 MMOL/L (ref 22–29)
CREAT SERPL-MCNC: 0.94 MG/DL (ref 0.57–1)
EGFRCR SERPLBLD CKD-EPI 2021: 83.4 ML/MIN/1.73
EOSINOPHIL # BLD AUTO: 0.06 10*3/MM3 (ref 0–0.4)
EOSINOPHIL NFR BLD AUTO: 1 % (ref 0.3–6.2)
ERYTHROCYTE [DISTWIDTH] IN BLOOD BY AUTOMATED COUNT: 13.2 % (ref 12.3–15.4)
FERRITIN SERPL-MCNC: 28.6 NG/ML (ref 13–150)
FOLATE SERPL-MCNC: 17.6 NG/ML (ref 4.78–24.2)
GLOBULIN SER CALC-MCNC: 2.4 GM/DL
GLUCOSE SERPL-MCNC: 81 MG/DL (ref 65–99)
HCT VFR BLD AUTO: 54 % (ref 34–46.6)
HDLC SERPL-MCNC: 70 MG/DL (ref 40–60)
HGB BLD-MCNC: 18.3 G/DL (ref 12–15.9)
IMM GRANULOCYTES # BLD AUTO: 0.01 10*3/MM3 (ref 0–0.05)
IMM GRANULOCYTES NFR BLD AUTO: 0.2 % (ref 0–0.5)
IRON SERPL-MCNC: 168 MCG/DL (ref 37–145)
LDLC SERPL CALC-MCNC: 120 MG/DL (ref 0–100)
LYMPHOCYTES # BLD AUTO: 1.56 10*3/MM3 (ref 0.7–3.1)
LYMPHOCYTES NFR BLD AUTO: 26.6 % (ref 19.6–45.3)
MCH RBC QN AUTO: 31.6 PG (ref 26.6–33)
MCHC RBC AUTO-ENTMCNC: 33.9 G/DL (ref 31.5–35.7)
MCV RBC AUTO: 93.3 FL (ref 79–97)
MONOCYTES # BLD AUTO: 0.44 10*3/MM3 (ref 0.1–0.9)
MONOCYTES NFR BLD AUTO: 7.5 % (ref 5–12)
NEUTROPHILS # BLD AUTO: 3.74 10*3/MM3 (ref 1.7–7)
NEUTROPHILS NFR BLD AUTO: 63.8 % (ref 42.7–76)
NRBC BLD AUTO-RTO: 0 /100 WBC (ref 0–0.2)
PLATELET # BLD AUTO: 266 10*3/MM3 (ref 140–450)
POTASSIUM SERPL-SCNC: 4.2 MMOL/L (ref 3.5–5.2)
PROT SERPL-MCNC: 7.5 G/DL (ref 6–8.5)
RBC # BLD AUTO: 5.79 10*6/MM3 (ref 3.77–5.28)
SODIUM SERPL-SCNC: 140 MMOL/L (ref 136–145)
TRIGL SERPL-MCNC: 75 MG/DL (ref 0–150)
VIT B12 SERPL-MCNC: 1140 PG/ML (ref 211–946)
VLDLC SERPL CALC-MCNC: 13 MG/DL (ref 5–40)
WBC # BLD AUTO: 5.86 10*3/MM3 (ref 3.4–10.8)

## 2023-10-30 ENCOUNTER — LAB (OUTPATIENT)
Dept: LAB | Facility: HOSPITAL | Age: 33
End: 2023-10-30
Payer: COMMERCIAL

## 2023-10-30 ENCOUNTER — CONSULT (OUTPATIENT)
Dept: ONCOLOGY | Facility: CLINIC | Age: 33
End: 2023-10-30
Payer: COMMERCIAL

## 2023-10-30 VITALS
BODY MASS INDEX: 30.13 KG/M2 | TEMPERATURE: 98.2 F | HEIGHT: 67 IN | WEIGHT: 192 LBS | HEART RATE: 76 BPM | DIASTOLIC BLOOD PRESSURE: 69 MMHG | RESPIRATION RATE: 12 BRPM | SYSTOLIC BLOOD PRESSURE: 140 MMHG | OXYGEN SATURATION: 99 %

## 2023-10-30 DIAGNOSIS — D75.1 POLYCYTHEMIA: ICD-10-CM

## 2023-10-30 DIAGNOSIS — I26.99 PULMONARY EMBOLISM WITHOUT ACUTE COR PULMONALE, UNSPECIFIED CHRONICITY, UNSPECIFIED PULMONARY EMBOLISM TYPE: Primary | Chronic | ICD-10-CM

## 2023-10-30 DIAGNOSIS — I26.99 PULMONARY EMBOLISM WITHOUT ACUTE COR PULMONALE, UNSPECIFIED CHRONICITY, UNSPECIFIED PULMONARY EMBOLISM TYPE: ICD-10-CM

## 2023-10-30 PROCEDURE — 82728 ASSAY OF FERRITIN: CPT

## 2023-10-30 PROCEDURE — 86147 CARDIOLIPIN ANTIBODY EA IG: CPT

## 2023-10-30 PROCEDURE — 84466 ASSAY OF TRANSFERRIN: CPT

## 2023-10-30 PROCEDURE — 85025 COMPLETE CBC W/AUTO DIFF WBC: CPT

## 2023-10-30 PROCEDURE — 83540 ASSAY OF IRON: CPT

## 2023-10-30 PROCEDURE — 80053 COMPREHEN METABOLIC PANEL: CPT

## 2023-10-30 PROCEDURE — 81240 F2 GENE: CPT

## 2023-10-30 PROCEDURE — 82248 BILIRUBIN DIRECT: CPT

## 2023-10-30 PROCEDURE — 36415 COLL VENOUS BLD VENIPUNCTURE: CPT

## 2023-10-30 PROCEDURE — 86146 BETA-2 GLYCOPROTEIN ANTIBODY: CPT

## 2023-10-30 NOTE — PROGRESS NOTES
Hematology and Oncology [place]  Office number [phone]    Fax number [phone]    New Patient Office Visit      Date: 10/30/2023     Patient Name: Christine Black  MRN: [id number]  : 1990    Referred by: Erika Curiel NP    Chief Complaint: VTE, polycythemia    History of Present Illness: Christine Black is a pleasant 33 y.o. female who presents today for evaluation of VTE.    In 2020 she presented with buttock pain and shoulder pain and was diagnosed with a DVT and PE.  She underwent thrombectomy with stent placement.  She had been recently initiated on OCPs in January of that year.  She had no preceding history of high risk travel or surgery.  She was initiated on Xarelto which she continues with good tolerance.  Her menses were heavy with a ParaGard, but are light since she had a Mirena placed in 2023.    She had a limited hypercoagulable panel obtained at the time of her presentationThis was notable for Antithrombin 3 levels which were normal; normal homocystine levels.  Protein C&S levels were normal.  Factor V Leiden gene mutation was negative.    Recently she was noted to have polycythemia with a hemoglobin of 18.  Notably, she had only had coffee on the morning of her blood draw.  Review of serial CBCs dating back to  show high normal hemoglobin on prior checks.    Notably she  has a family history of a provoked blood clot in her father at the age of 30 following a hip fracture with repair.  He also experienced a blood clot in his 60s with travel.  She also has a family history of early onset breast cancer in her paternal aunt at age 49.  No prior cancer genetic testing that she is aware of.    Review of Systems:   I have reviewed the review of systems completed by the patient. This is negative for clinically significant symptoms except as noted below. This document has been scanned into the patient's chart. Negative    Past Medical History:   Past Medical History:    Diagnosis Date    Clotting disorder 3/7/2020    Blood clot in leg and lung. Removal of blood clot in leg    Deep vein thrombosis 3/7/2020    Family history of blood clots     History of blood clots     Ovarian cyst     Pulmonary embolism 03/07/2020    Blood clots in right leg and lungs       Past Surgical History:   Past Surgical History:   Procedure Laterality Date    CARDIAC CATHETERIZATION Right 3/9/2020    Procedure: Peripheral angiography;  Surgeon: Margarito Yan MD;  Location: [facility] CATH INVASIVE LOC[facility];  Service: Cardiovascular;  Laterality: Right;  Prone/ R. Popliteal Access    OTHER SURGICAL HISTORY      Blood clot removal- Right Leg         Family History:   Family History   Problem Relation Age of Onset    Breast cancer Paternal Aunt     Breast cancer Paternal Aunt        Social History:   Social History     Socioeconomic History    Marital status: Single   Tobacco Use    Smoking status: Never    Smokeless tobacco: Never   Vaping Use    Vaping Use: Never used   Substance and Sexual Activity    Alcohol use: Yes     Alcohol/week: 1.0 standard drink of alcohol     Types: 1 Glasses of wine per week    Drug use: Never    Sexual activity: Yes     Partners: Male     Birth control/protection: I.U.D.       Medications:     Current Outpatient Medications:     Levonorgestrel (MIRENA, 52 MG, IU), by Intrauterine route., Disp: , Rfl:     minocycline (MINOCIN,DYNACIN) 100 MG capsule, Take 1 capsule by mouth 2 (Two) Times a Day., Disp: 60 capsule, Rfl: 2    spironolactone (ALDACTONE) 100 MG tablet, Take 1.5 tablets by mouth Daily., Disp: 45 tablet, Rfl: 2    tretinoin (RETIN-A) 0.025 % gel, Apply  topically to the appropriate area as directed Every Night., Disp: 20 g, Rfl: 0    Xarelto 20 MG tablet, TAKE ONE TABLET BY MOUTH DAILY, Disp: 90 tablet, Rfl: 3    Allergies:   No Known Allergies    Objective     Vital Signs:   Vitals:    10/30/23 1501   BP: 140/69   Pulse: 76   Resp: 12   Temp: 98.2 °F  "(36.8 °C)   TempSrc: Temporal   SpO2: 99%   Weight: 87.1 kg (192 lb)   Height: 170.2 cm (67\")   PainSc: 0-No pain    Body mass index is 30.07 kg/m².   Pain Score    10/30/23 1501   PainSc: 0-No pain       Physical Exam:   General: No acute distress. Well appearing   HEENT: Normocephalic, atraumatic. Sclera anicteric.   Neck: supple, no adenopathy.   Cardiovascular: regular rate and rhythm. No murmurs.   Respiratory: Normal rate. Clear to auscultation bilaterally.  Abdomen: Soft, nontender, non distended with normoactive bowel sounds. No hepatosplenomegaly  Lymph: no cervical, supraclavicular or axillary adenopathy.  Neuro: Alert and oriented x 3. No focal deficits.   Ext: Symmetric, no swelling.   Psych: Euthymic      Laboratory/Imaging Reviewed:   No visits with results within 2 Week(s) from this visit.   Latest known visit with results is:   Office Visit on 10/06/2023   Component Date Value Ref Range Status    Iron 10/06/2023 168 (H)  37 - 145 mcg/dL Final    Ferritin 10/06/2023 28.60  13.00 - 150.00 ng/mL Final    Results may be falsely decreased if patient taking Biotin.    WBC 10/06/2023 5.86  3.40 - 10.80 10*3/mm3 Final    RBC 10/06/2023 5.79 (H)  3.77 - 5.28 10*6/mm3 Final    Hemoglobin 10/06/2023 18.3 (H)  12.0 - 15.9 g/dL Final    Hematocrit 10/06/2023 54.0 (H)  34.0 - 46.6 % Final    MCV 10/06/2023 93.3  79.0 - 97.0 fL Final    MCH 10/06/2023 31.6  26.6 - 33.0 pg Final    MCHC 10/06/2023 33.9  31.5 - 35.7 g/dL Final    RDW 10/06/2023 13.2  12.3 - 15.4 % Final    Platelets 10/06/2023 266  140 - 450 10*3/mm3 Final    Neutrophil Rel % 10/06/2023 63.8  42.7 - 76.0 % Final    Lymphocyte Rel % 10/06/2023 26.6  19.6 - 45.3 % Final    Monocyte Rel % 10/06/2023 7.5  5.0 - 12.0 % Final    Eosinophil Rel % 10/06/2023 1.0  0.3 - 6.2 % Final    Basophil Rel % 10/06/2023 0.9  0.0 - 1.5 % Final    Neutrophils Absolute 10/06/2023 3.74  1.70 - 7.00 10*3/mm3 Final    Lymphocytes Absolute 10/06/2023 1.56  0.70 - 3.10 " 10*3/mm3 Final    Monocytes Absolute 10/06/2023 0.44  0.10 - 0.90 10*3/mm3 Final    Eosinophils Absolute 10/06/2023 0.06  0.00 - 0.40 10*3/mm3 Final    Basophils Absolute 10/06/2023 0.05  0.00 - 0.20 10*3/mm3 Final    Immature Granulocyte Rel % 10/06/2023 0.2  0.0 - 0.5 % Final    Immature Grans Absolute 10/06/2023 0.01  0.00 - 0.05 10*3/mm3 Final    nRBC 10/06/2023 0.0  0.0 - 0.2 /100 WBC Final    Vitamin B-12 10/06/2023 1,140 (H)  211 - 946 pg/mL Final    Results may be falsely increased if patient taking Biotin.    Folate 10/06/2023 17.60  4.78 - 24.20 ng/mL Final    Results may be falsely increased if patient taking Biotin.    Glucose 10/06/2023 81  65 - 99 mg/dL Final    BUN 10/06/2023 15  6 - 20 mg/dL Final    Creatinine 10/06/2023 0.94  0.57 - 1.00 mg/dL Final    EGFR Result 10/06/2023 83.4  >60.0 mL/min/1.73 Final    Comment: GFR Normal >60  Chronic Kidney Disease <60  Kidney Failure <15      BUN/Creatinine Ratio 10/06/2023 16.0  7.0 - 25.0 Final    Sodium 10/06/2023 140  136 - 145 mmol/L Final    Potassium 10/06/2023 4.2  3.5 - 5.2 mmol/L Final    Chloride 10/06/2023 102  98 - 107 mmol/L Final    Total CO2 10/06/2023 25.4  22.0 - 29.0 mmol/L Final    Calcium 10/06/2023 10.4  8.6 - 10.5 mg/dL Final    Total Protein 10/06/2023 7.5  6.0 - 8.5 g/dL Final    Albumin 10/06/2023 5.1  3.5 - 5.2 g/dL Final    Globulin 10/06/2023 2.4  gm/dL Final    A/G Ratio 10/06/2023 2.1  g/dL Final    Total Bilirubin 10/06/2023 1.7 (H)  0.0 - 1.2 mg/dL Final    Alkaline Phosphatase 10/06/2023 63  39 - 117 U/L Final    AST (SGOT) 10/06/2023 75 (H)  1 - 32 U/L Final    ALT (SGPT) 10/06/2023 20  1 - 33 U/L Final    Total Cholesterol 10/06/2023 203 (H)  0 - 200 mg/dL Final    Comment: Cholesterol Reference Ranges  (U.S. Department of Health and Human Services ATP III  Classifications)  Desirable          <200 mg/dL  Borderline High    200-239 mg/dL  High Risk          >240 mg/dL  Triglyceride Reference Ranges  (U.S. Department of  Health and Human Services ATP III  Classifications)  Normal           <150 mg/dL  Borderline High  150-199 mg/dL  High             200-499 mg/dL  Very High        >500 mg/dL  HDL Reference Ranges  (U.S. Department of Health and Human Services ATP III  Classifications)  Low     <40 mg/dl (major risk factor for CHD)  High    >60 mg/dl ('negative' risk factor for CHD)  LDL Reference Ranges  (U.S. Department of Health and Human Services ATP III  Classifications)  Optimal          <100 mg/dL  Near Optimal     100-129 mg/dL  Borderline High  130-159 mg/dL  High             160-189 mg/dL  Very High        >189 mg/dL      Triglycerides 10/06/2023 75  0 - 150 mg/dL Final    HDL Cholesterol 10/06/2023 70 (H)  40 - 60 mg/dL Final    VLDL Cholesterol Cuco 10/06/2023 13  5 - 40 mg/dL Final    LDL Chol Calc (NIH) 10/06/2023 120 (H)  0 - 100 mg/dL Final     Component      Latest Ref Rng 3/9/2020   WBC      3.40 - 10.80 10*3/mm3 11.82 (H)    RBC      3.77 - 5.28 10*6/mm3 4.79    Hemoglobin      12.0 - 15.9 g/dL 15.3    Hematocrit      34.0 - 46.6 % 45.5    MCV      79.0 - 97.0 fL 95.0    MCH      26.6 - 33.0 pg 31.9    MCHC      31.5 - 35.7 g/dL 33.6    RDW      12.3 - 15.4 % 12.6    RDW-SD      37.0 - 54.0 fl 44.7    MPV      6.0 - 12.0 fL 8.7    Platelets      140 - 450 10*3/mm3 327    Neutrophil Rel %      42.7 - 76.0 % 81.4 (H)    Lymphocyte Rel %      19.6 - 45.3 % 10.9 (L)    Monocyte Rel %      5.0 - 12.0 % 6.9    Eosinophil Rel %      0.3 - 6.2 % 0.1 (L)    Basophil Rel %      0.0 - 1.5 % 0.3    Immature Granulocyte Rel %      0.0 - 0.5 % 0.4    Neutrophils Absolute      1.70 - 7.00 10*3/mm3 9.63 (H)    Lymphocytes Absolute      0.70 - 3.10 10*3/mm3 1.29    Monocytes Absolute      0.10 - 0.90 10*3/mm3 0.81    Eosinophils Absolute      0.00 - 0.40 10*3/mm3 0.01    Basophils Absolute      0.00 - 0.20 10*3/mm3 0.03    Immature Grans, Absolute      0.00 - 0.05 10*3/mm3 0.05    nRBC      0.0 - 0.2 /100 WBC 0.0    Glucose      65  - 99 mg/dL 94    BUN      6 - 20 mg/dL 8    Creatinine      0.57 - 1.00 mg/dL 0.58    Sodium      136 - 145 mmol/L 135 (L)    Potassium      3.5 - 5.2 mmol/L 4.5    Chloride      98 - 107 mmol/L 96 (L)    CO2      22.0 - 29.0 mmol/L 27.0    Calcium      8.6 - 10.5 mg/dL 9.9    Total Protein      6.0 - 8.5 g/dL 7.4    Albumin      3.50 - 5.20 g/dL 3.50    ALT (SGPT)      1 - 33 U/L 10    AST (SGOT)      1 - 32 U/L 34 (H)    Alkaline Phosphatase      39 - 117 U/L 68    Total Bilirubin      0.2 - 1.2 mg/dL 0.6    eGFR Non African Am      >60 mL/min/1.73 124    Globulin      gm/dL 3.9    A/G Ratio      g/dL 0.9    BUN/Creatinine Ratio      7.0 - 25.0  13.8    Anion Gap      5.0 - 15.0 mmol/L 12.0    Protein C-Functional      73 - 180 % 120    Protein S Functional      63 - 140 % 111    TSH Baseline      0.270 - 4.200 uIU/mL 4.070    Factor V Leiden      Normal  Normal    Beta-2 Microglobulin      0.8 - 2.2 mg/L 1.7    Homocysteine, Plasma      0.0 - 15.0 umol/L 4.0    AntiThromb III Func      75 - 135 % 130     No results found.  Component      Latest Ref Rng 10/6/2023   WBC      3.40 - 10.80 10*3/mm3 5.86    RBC      3.77 - 5.28 10*6/mm3 5.79 (H)    Hemoglobin      12.0 - 15.9 g/dL 18.3 (H)    Hematocrit      34.0 - 46.6 % 54.0 (H)    MCV      79.0 - 97.0 fL 93.3    MCH      26.6 - 33.0 pg 31.6    MCHC      31.5 - 35.7 g/dL 33.9    RDW      12.3 - 15.4 % 13.2    Platelets      140 - 450 10*3/mm3 266    Neutrophil Rel %      42.7 - 76.0 % 63.8    Lymphocyte Rel %      19.6 - 45.3 % 26.6    Monocyte Rel %      5.0 - 12.0 % 7.5    Eosinophil Rel %      0.3 - 6.2 % 1.0    Basophil Rel %      0.0 - 1.5 % 0.9    Neutrophils Absolute      1.70 - 7.00 10*3/mm3 3.74    Lymphocytes Absolute      0.70 - 3.10 10*3/mm3 1.56    Monocytes Absolute      0.10 - 0.90 10*3/mm3 0.44    Eosinophils Absolute      0.00 - 0.40 10*3/mm3 0.06    Basophils Absolute      0.00 - 0.20 10*3/mm3 0.05    Immature Granulocyte Rel %      0.0 - 0.5 % 0.2     Immature Grans, Absolute      0.00 - 0.05 10*3/mm3 0.01    nRBC      0.0 - 0.2 /100 WBC 0.0    Glucose      65 - 99 mg/dL 81    BUN      6 - 20 mg/dL 15    Creatinine      0.57 - 1.00 mg/dL 0.94    EGFR Result      >60.0 mL/min/1.73 83.4    BUN/Creatinine Ratio      7.0 - 25.0  16.0    Sodium      136 - 145 mmol/L 140    Potassium      3.5 - 5.2 mmol/L 4.2    Chloride      98 - 107 mmol/L 102    CO2      22.0 - 29.0 mmol/L 25.4    Calcium      8.6 - 10.5 mg/dL 10.4    Total Protein      6.0 - 8.5 g/dL 7.5    Albumin      3.5 - 5.2 g/dL 5.1    Globulin      gm/dL 2.4    A/G Ratio      g/dL 2.1    Total Bilirubin      0.0 - 1.2 mg/dL 1.7 (H)    Alkaline Phosphatase      39 - 117 U/L 63    AST (SGOT)      1 - 32 U/L 75 (H)    ALT (SGPT)      1 - 33 U/L 20    Vitamin B-12      211 - 946 pg/mL 1,140 (H)    Folate      4.78 - 24.20 ng/mL 17.60    Iron      37 - 145 mcg/dL 168 (H)    Ferritin      13.00 - 150.00 ng/mL 28.60       Component      Latest Ref Rng 3/6/2020 3/9/2020 3/10/2020 7/19/2020 1/10/2022   WBC      3.40 - 10.80 10*3/mm3 13.03 (H)  11.82 (H)  9.78  9.50  4.45    RBC      3.77 - 5.28 10*6/mm3 4.73  4.79  4.38  4.68  5.19    Hemoglobin      12.0 - 15.9 g/dL 15.0  15.3  13.8  14.7  14.4    Hematocrit      34.0 - 46.6 % 45.2  45.5  41.0  43.6  44.3    MCV      79.0 - 97.0 fL 95.6  95.0  93.6  93.2  85.4    MCH      26.6 - 33.0 pg 31.7  31.9  31.5  31.4  27.7    MCHC      31.5 - 35.7 g/dL 33.2  33.6  33.7  33.7  32.5    RDW      12.3 - 15.4 % 12.8  12.6  12.4  13.4  13.8    RDW-SD      37.0 - 54.0 fl 45.7  44.7  43.3  45.6     MPV      6.0 - 12.0 fL 8.4  8.7  8.7  9.3     Platelets      140 - 450 10*3/mm3 278  327  317  205  260    Neutrophil Rel %      42.7 - 76.0 % 85.0 (H)  81.4 (H)  77.4 (H)  77.3 (H)  59.6    Lymphocyte Rel %      19.6 - 45.3 % 7.8 (L)  10.9 (L)  14.0 (L)  11.8 (L)  26.5    Monocyte Rel %      5.0 - 12.0 % 6.4  6.9  7.8  9.5  10.6    Eosinophil Rel %      0.3 - 6.2 % 0.1 (L)  0.1 (L)   0.0 (L)  0.5  2.7    Basophil Rel %      0.0 - 1.5 % 0.2  0.3  0.2  0.5  0.4    Immature Granulocyte Rel %      0.0 - 0.5 % 0.5  0.4  0.6 (H)  0.4  0.2    Neutrophils Absolute      1.70 - 7.00 10*3/mm3 11.08 (H)  9.63 (H)  7.57 (H)  7.34 (H)  2.65    Lymphocytes Absolute      0.70 - 3.10 10*3/mm3 1.01  1.29  1.37  1.12  1.18    Monocytes Absolute      0.10 - 0.90 10*3/mm3 0.84  0.81  0.76  0.90  0.47    Eosinophils Absolute      0.00 - 0.40 10*3/mm3 0.01  0.01  0.00  0.05  0.12    Basophils Absolute      0.00 - 0.20 10*3/mm3 0.03  0.03  0.02  0.05  0.02    Immature Grans, Absolute      0.00 - 0.05 10*3/mm3 0.06 (H)  0.05  0.06 (H)  0.04  0.01    nRBC      0.0 - 0.2 /100 WBC 0.0  0.0  0.0  0.0  0.0      Component      Latest Ref Rng 1/25/2023 2/14/2023 10/6/2023   WBC      3.40 - 10.80 10*3/mm3 5.18  5.68  5.86    RBC      3.77 - 5.28 10*6/mm3 5.36 (H)  5.48 (H)  5.79 (H)    Hemoglobin      12.0 - 15.9 g/dL 14.6  14.8  18.3 (H)    Hematocrit      34.0 - 46.6 % 45.1  45.7  54.0 (H)    MCV      79.0 - 97.0 fL 84.1  83.4  93.3    MCH      26.6 - 33.0 pg 27.2  27.0  31.6    MCHC      31.5 - 35.7 g/dL 32.4  32.4  33.9    RDW      12.3 - 15.4 % 15.1  16.6 (H)  13.2    RDW-SD      37.0 - 54.0 fl  50.1     MPV      6.0 - 12.0 fL  9.0     Platelets      140 - 450 10*3/mm3 245  248  266    Neutrophil Rel %      42.7 - 76.0 % 51.7  65.4  63.8    Lymphocyte Rel %      19.6 - 45.3 % 26.1  25.4  26.6    Monocyte Rel %      5.0 - 12.0 % 19.7 (H)  8.1  7.5    Eosinophil Rel %      0.3 - 6.2 % 1.7  0.4  1.0    Basophil Rel %      0.0 - 1.5 % 0.6  0.5  0.9    Immature Granulocyte Rel %      0.0 - 0.5 % 0.2  0.2  0.2    Neutrophils Absolute      1.70 - 7.00 10*3/mm3 2.68  3.72  3.74    Lymphocytes Absolute      0.70 - 3.10 10*3/mm3 1.35  1.44  1.56    Monocytes Absolute      0.10 - 0.90 10*3/mm3 1.02 (H)  0.46  0.44    Eosinophils Absolute      0.00 - 0.40 10*3/mm3 0.09  0.02  0.06    Basophils Absolute      0.00 - 0.20 10*3/mm3 0.03   0.03  0.05    Immature Grans, Absolute      0.00 - 0.05 10*3/mm3 0.01  0.01  0.01    nRBC      0.0 - 0.2 /100 WBC 0.0  0.0  0.0     Only had coffee on day of blood draw  Assessment / Plan      Assessment/Plan:     1. Pulmonary embolism, provoked in the context of oral contraceptives  2.  Family history of recurrent VTE in her father  -I reviewed the patient's medical history as outlined above.  She presented with a provoked VTE event in the context of oral contraceptive use.  Because of her family history she has been continued on indefinite anticoagulation.  We are going to proceed with a more comprehensive hypercoagulable work-up including the following:  -     Prothrombin gene mutation; Future  -     Anticardiolipin Antibody, IgG / M, Qn; Future  -     Beta-2 Glycoprotein Antibodies; Future  -     Iron Profile; Future  -     Ferritin; Future  -     CBC & Differential; Future  -     Peripheral Blood Smear; Future  -     Comprehensive Metabolic Panel; Future  -     Bilirubin, Direct; Future  -We will follow-up in 4 weeks with results.    3.  Polycythemia  -This is a new finding on her most recent blood work, although I do note that her blood counts had previously been in the high end of normal.  We will obtain repeat labs.  If polycythemia persists would recommend JAK2 testing, particularly in the context of her underlying thrombotic history.  Discussed potential management strategy if primary polycythemia were confirmed.       Follow Up:   4 weeks      Reshma Baig MD  Hematology and Oncology     Time spent on the day of service was 45 minutes inclusive of time before, during, and after office visit on record review, medically appropriate history and physical, counseling patient, ordering tests, documenting in the medical record.

## 2023-10-30 NOTE — LETTER
2023       No Recipients    Patient: Christine Black   YOB: 1990   Date of Visit: 10/30/2023     Dear MAVERICK Vasquez:       Thank you for referring Christine Black to me for evaluation. Below are the relevant portions of my assessment and plan of care.    If you have questions, please do not hesitate to call me. I look forward to following Christine along with you.         Sincerely,        Reshma Baig MD        CC:   No Recipients    Reshma Baig MD  [phone]  Sign when Signing Visit       H[facility]ology and Oncology [place]  Office number [phone]    Fax number [phone]    New Patient Office Visit      Date: 10/30/2023     Patient Name: Christine Black  MRN: [id number]  : 1990    Referred by: Erika Curiel NP    Chief Complaint: VTE, polycythemia    History of Present Illness: Christine Black is a pleasant 33 y.o. female who presents today for evaluation of VTE.    In 2020 she presented with buttock pain and shoulder pain and was diagnosed with a DVT and PE.  She underwent thrombectomy with stent placement.  She had been recently initiated on OCPs in January of that year.  She had no preceding history of high risk travel or surgery.  She was initiated on Xarelto which she continues with good tolerance.  Her menses were heavy with a ParaGard, but are light since she had a Mirena placed in 2023.    She had a limited hypercoagulable panel obtained at the time of her presentationThis was notable for Antithrombin 3 levels which were normal; normal homocystine levels.  Protein C&S levels were normal.  Factor V Leiden gene mutation was negative.    Recently she was noted to have polycythemia with a hemoglobin of 18.  Notably, she had only had coffee on the morning of her blood draw.  Review of serial CBCs dating back to  show high normal hemoglobin on prior checks.    Notably she  has a family history of a provoked blood clot in her father  at the age of 30 following a hip fracture with repair.  He also experienced a blood clot in his 60s with travel.  She also has a family history of early onset breast cancer in her paternal aunt at age 49.  No prior cancer genetic testing that she is aware of.    Review of Systems:   I have reviewed the review of systems completed by the patient. This is negative for clinically significant symptoms except as noted below. This document has been scanned into the patient's chart. Negative    Past Medical History:   Past Medical History:   Diagnosis Date   • Clotting disorder 3/7/2020    Blood clot in leg and lung. Removal of blood clot in leg   • Deep vein thrombosis 3/7/2020   • Family history of blood clots    • History of blood clots    • Ovarian cyst    • Pulmonary embolism 03/07/2020    Blood clots in right leg and lungs       Past Surgical History:   Past Surgical History:   Procedure Laterality Date   • CARDIAC CATHETERIZATION Right 3/9/2020    Procedure: Peripheral angiography;  Surgeon: Margarito Yan MD;  Location: [facility] CATH INVASIVE LOCATION;  Service: Cardiovascular;  Laterality: Right;  Prone/ R. Popliteal Access   • OTHER SURGICAL HISTORY      Blood clot removal- Right Leg         Family History:   Family History   Problem Relation Age of Onset   • Breast cancer Paternal Aunt    • Breast cancer Paternal Aunt        Social History:   Social History     Socioeconomic History   • Marital status: Single   Tobacco Use   • Smoking status: Never   • Smokeless tobacco: Never   Vaping Use   • Vaping Use: Never used   Substance and Sexual Activity   • Alcohol use: Yes     Alcohol/week: 1.0 standard drink of alcohol     Types: 1 Glasses of wine per week   • Drug use: Never   • Sexual activity: Yes     Partners: Male     Birth control/protection: I.U.D.       Medications:     Current Outpatient Medications:   •  Levonorgestrel (MIRENA, 52 MG, IU), by Intrauterine route., Disp: , Rfl:   •   "minocycline (MINOCIN,DYNACIN) 100 MG capsule, Take 1 capsule by mouth 2 (Two) Times a Day., Disp: 60 capsule, Rfl: 2  •  spironolactone (ALDACTONE) 100 MG tablet, Take 1.5 tablets by mouth Daily., Disp: 45 tablet, Rfl: 2  •  tretinoin (RETIN-A) 0.025 % gel, Apply  topically to the appropriate area as directed Every Night., Disp: 20 g, Rfl: 0  •  Xarelto 20 MG tablet, TAKE ONE TABLET BY MOUTH DAILY, Disp: 90 tablet, Rfl: 3    Allergies:   No Known Allergies    Objective     Vital Signs:   Vitals:    10/30/23 1501   BP: 140/69   Pulse: 76   Resp: 12   Temp: 98.2 °F (36.8 °C)   TempSrc: Temporal   SpO2: 99%   Weight: 87.1 kg (192 lb)   Height: 170.2 cm (67\")   PainSc: 0-No pain    Body mass index is 30.07 kg/m².   Pain Score    10/30/23 1501   PainSc: 0-No pain       Physical Exam:   General: No acute distress. Well appearing   HEENT: Normocephalic, atraumatic. Sclera anicteric.   Neck: supple, no adenopathy.   Cardiovascular: regular rate and rhythm. No murmurs.   Respiratory: Normal rate. Clear to auscultation bilaterally.  Abdomen: Soft, nontender, non distended with normoactive bowel sounds. No hepatosplenomegaly  Lymph: no cervical, supraclavicular or axillary adenopathy.  Neuro: Alert and oriented x 3. No focal deficits.   Ext: Symmetric, no swelling.   Psych: Euthymic      Laboratory/Imaging Reviewed:   No visits with results within 2 Week(s) from this visit.   Latest known visit with results is:   Office Visit on 10/06/2023   Component Date Value Ref Range Status   • Iron 10/06/2023 168 (H)  37 - 145 mcg/dL Final   • Ferritin 10/06/2023 28.60  13.00 - 150.00 ng/mL Final    Results may be falsely decreased if patient taking Biotin.   • WBC 10/06/2023 5.86  3.40 - 10.80 10*3/mm3 Final   • RBC 10/06/2023 5.79 (H)  3.77 - 5.28 10*6/mm3 Final   • Hemoglobin 10/06/2023 18.3 (H)  12.0 - 15.9 g/dL Final   • Hematocrit 10/06/2023 54.0 (H)  34.0 - 46.6 % Final   • MCV 10/06/2023 93.3  79.0 - 97.0 fL Final   • MCH " 10/06/2023 31.6  26.6 - 33.0 pg Final   • MCHC 10/06/2023 33.9  31.5 - 35.7 g/dL Final   • RDW 10/06/2023 13.2  12.3 - 15.4 % Final   • Platelets 10/06/2023 266  140 - 450 10*3/mm3 Final   • Neutrophil Rel % 10/06/2023 63.8  42.7 - 76.0 % Final   • Lymphocyte Rel % 10/06/2023 26.6  19.6 - 45.3 % Final   • Monocyte Rel % 10/06/2023 7.5  5.0 - 12.0 % Final   • Eosinophil Rel % 10/06/2023 1.0  0.3 - 6.2 % Final   • Basophil Rel % 10/06/2023 0.9  0.0 - 1.5 % Final   • Neutrophils Absolute 10/06/2023 3.74  1.70 - 7.00 10*3/mm3 Final   • Lymphocytes Absolute 10/06/2023 1.56  0.70 - 3.10 10*3/mm3 Final   • Monocytes Absolute 10/06/2023 0.44  0.10 - 0.90 10*3/mm3 Final   • Eosinophils Absolute 10/06/2023 0.06  0.00 - 0.40 10*3/mm3 Final   • Basophils Absolute 10/06/2023 0.05  0.00 - 0.20 10*3/mm3 Final   • Immature Granulocyte Rel % 10/06/2023 0.2  0.0 - 0.5 % Final   • Immature Grans Absolute 10/06/2023 0.01  0.00 - 0.05 10*3/mm3 Final   • nRBC 10/06/2023 0.0  0.0 - 0.2 /100 WBC Final   • Vitamin B-12 10/06/2023 1,140 (H)  211 - 946 pg/mL Final    Results may be falsely increased if patient taking Biotin.   • Folate 10/06/2023 17.60  4.78 - 24.20 ng/mL Final    Results may be falsely increased if patient taking Biotin.   • Glucose 10/06/2023 81  65 - 99 mg/dL Final   • BUN 10/06/2023 15  6 - 20 mg/dL Final   • Creatinine 10/06/2023 0.94  0.57 - 1.00 mg/dL Final   • EGFR Result 10/06/2023 83.4  >60.0 mL/min/1.73 Final    Comment: GFR Normal >60  Chronic Kidney Disease <60  Kidney Failure <15     • BUN/Creatinine Ratio 10/06/2023 16.0  7.0 - 25.0 Final   • Sodium 10/06/2023 140  136 - 145 mmol/L Final   • Potassium 10/06/2023 4.2  3.5 - 5.2 mmol/L Final   • Chloride 10/06/2023 102  98 - 107 mmol/L Final   • Total CO2 10/06/2023 25.4  22.0 - 29.0 mmol/L Final   • Calcium 10/06/2023 10.4  8.6 - 10.5 mg/dL Final   • Total Protein 10/06/2023 7.5  6.0 - 8.5 g/dL Final   • Albumin 10/06/2023 5.1  3.5 - 5.2 g/dL Final   • Globulin  10/06/2023 2.4  gm/dL Final   • A/G Ratio 10/06/2023 2.1  g/dL Final   • Total Bilirubin 10/06/2023 1.7 (H)  0.0 - 1.2 mg/dL Final   • Alkaline Phosphatase 10/06/2023 63  39 - 117 U/L Final   • AST (SGOT) 10/06/2023 75 (H)  1 - 32 U/L Final   • ALT (SGPT) 10/06/2023 20  1 - 33 U/L Final   • Total Cholesterol 10/06/2023 203 (H)  0 - 200 mg/dL Final    Comment: Cholesterol Reference Ranges  (U.S. Department of Health and Human Services ATP III  Classifications)  Desirable          <200 mg/dL  Borderline High    200-239 mg/dL  High Risk          >240 mg/dL  Triglyceride Reference Ranges  (U.S. Department of Health and Human Services ATP III  Classifications)  Normal           <150 mg/dL  Borderline High  150-199 mg/dL  High             200-499 mg/dL  Very High        >500 mg/dL  HDL Reference Ranges  (U.S. Department of Health and Human Services ATP III  Classifications)  Low     <40 mg/dl (major risk factor for CHD)  High    >60 mg/dl ('negative' risk factor for CHD)  LDL Reference Ranges  (U.S. Department of Health and Human Services ATP III  Classifications)  Optimal          <100 mg/dL  Near Optimal     100-129 mg/dL  Borderline High  130-159 mg/dL  High             160-189 mg/dL  Very High        >189 mg/dL     • Triglycerides 10/06/2023 75  0 - 150 mg/dL Final   • HDL Cholesterol 10/06/2023 70 (H)  40 - 60 mg/dL Final   • VLDL Cholesterol Cuco 10/06/2023 13  5 - 40 mg/dL Final   • LDL Chol Calc ([facility]) 10/06/2023 120 (H)  0 - 100 mg/dL Final     Component      Latest Ref Rng 3/9/2020   WBC      3.40 - 10.80 10*3/mm3 11.82 (H)    RBC      3.77 - 5.28 10*6/mm3 4.79    Hemoglobin      12.0 - 15.9 g/dL 15.3    Hematocrit      34.0 - 46.6 % 45.5    MCV      79.0 - 97.0 fL 95.0    MCH      26.6 - 33.0 pg 31.9    MCHC      31.5 - 35.7 g/dL 33.6    RDW      12.3 - 15.4 % 12.6    RDW-SD      37.0 - 54.0 fl 44.7    MPV      6.0 - 12.0 fL 8.7    Platelets      140 - 450 10*3/mm3 327    Neutrophil Rel %      42.7 - 76.0 % 81.4  (H)    Lymphocyte Rel %      19.6 - 45.3 % 10.9 (L)    Monocyte Rel %      5.0 - 12.0 % 6.9    Eosinophil Rel %      0.3 - 6.2 % 0.1 (L)    Basophil Rel %      0.0 - 1.5 % 0.3    Immature Granulocyte Rel %      0.0 - 0.5 % 0.4    Neutrophils Absolute      1.70 - 7.00 10*3/mm3 9.63 (H)    Lymphocytes Absolute      0.70 - 3.10 10*3/mm3 1.29    Monocytes Absolute      0.10 - 0.90 10*3/mm3 0.81    Eosinophils Absolute      0.00 - 0.40 10*3/mm3 0.01    Basophils Absolute      0.00 - 0.20 10*3/mm3 0.03    Immature Grans, Absolute      0.00 - 0.05 10*3/mm3 0.05    nRBC      0.0 - 0.2 /100 WBC 0.0    Glucose      65 - 99 mg/dL 94    BUN      6 - 20 mg/dL 8    Creatinine      0.57 - 1.00 mg/dL 0.58    Sodium      136 - 145 mmol/L 135 (L)    Potassium      3.5 - 5.2 mmol/L 4.5    Chloride      98 - 107 mmol/L 96 (L)    CO2      22.0 - 29.0 mmol/L 27.0    Calcium      8.6 - 10.5 mg/dL 9.9    Total Protein      6.0 - 8.5 g/dL 7.4    Albumin      3.50 - 5.20 g/dL 3.50    ALT (SGPT)      1 - 33 U/L 10    AST (SGOT)      1 - 32 U/L 34 (H)    Alkaline Phosphatase      39 - 117 U/L 68    Total Bilirubin      0.2 - 1.2 mg/dL 0.6    eGFR Non African Am      >60 mL/min/1.73 124    Globulin      gm/dL 3.9    A/G Ratio      g/dL 0.9    BUN/Creatinine Ratio      7.0 - 25.0  13.8    Anion Gap      5.0 - 15.0 mmol/L 12.0    Protein C-Functional      73 - 180 % 120    Protein S Functional      63 - 140 % 111    TSH Baseline      0.270 - 4.200 uIU/mL 4.070    Factor V Leiden      Normal  Normal    Beta-2 Microglobulin      0.8 - 2.2 mg/L 1.7    Homocysteine, Plasma      0.0 - 15.0 umol/L 4.0    AntiThromb III Func      75 - 135 % 130     No results found.  Component      Latest Ref Rng 10/6/2023   WBC      3.40 - 10.80 10*3/mm3 5.86    RBC      3.77 - 5.28 10*6/mm3 5.79 (H)    Hemoglobin      12.0 - 15.9 g/dL 18.3 (H)    Hematocrit      34.0 - 46.6 % 54.0 (H)    MCV      79.0 - 97.0 fL 93.3    MCH      26.6 - 33.0 pg 31.6    MCHC      31.5 -  35.7 g/dL 33.9    RDW      12.3 - 15.4 % 13.2    Platelets      140 - 450 10*3/mm3 266    Neutrophil Rel %      42.7 - 76.0 % 63.8    Lymphocyte Rel %      19.6 - 45.3 % 26.6    Monocyte Rel %      5.0 - 12.0 % 7.5    Eosinophil Rel %      0.3 - 6.2 % 1.0    Basophil Rel %      0.0 - 1.5 % 0.9    Neutrophils Absolute      1.70 - 7.00 10*3/mm3 3.74    Lymphocytes Absolute      0.70 - 3.10 10*3/mm3 1.56    Monocytes Absolute      0.10 - 0.90 10*3/mm3 0.44    Eosinophils Absolute      0.00 - 0.40 10*3/mm3 0.06    Basophils Absolute      0.00 - 0.20 10*3/mm3 0.05    Immature Granulocyte Rel %      0.0 - 0.5 % 0.2    Immature Grans, Absolute      0.00 - 0.05 10*3/mm3 0.01    nRBC      0.0 - 0.2 /100 WBC 0.0    Glucose      65 - 99 mg/dL 81    BUN      6 - 20 mg/dL 15    Creatinine      0.57 - 1.00 mg/dL 0.94    EGFR Result      >60.0 mL/min/1.73 83.4    BUN/Creatinine Ratio      7.0 - 25.0  16.0    Sodium      136 - 145 mmol/L 140    Potassium      3.5 - 5.2 mmol/L 4.2    Chloride      98 - 107 mmol/L 102    CO2      22.0 - 29.0 mmol/L 25.4    Calcium      8.6 - 10.5 mg/dL 10.4    Total Protein      6.0 - 8.5 g/dL 7.5    Albumin      3.5 - 5.2 g/dL 5.1    Globulin      gm/dL 2.4    A/G Ratio      g/dL 2.1    Total Bilirubin      0.0 - 1.2 mg/dL 1.7 (H)    Alkaline Phosphatase      39 - 117 U/L 63    AST (SGOT)      1 - 32 U/L 75 (H)    ALT (SGPT)      1 - 33 U/L 20    Vitamin B-12      211 - 946 pg/mL 1,140 (H)    Folate      4.78 - 24.20 ng/mL 17.60    Iron      37 - 145 mcg/dL 168 (H)    Ferritin      13.00 - 150.00 ng/mL 28.60       Component      Latest Ref Rng 3/6/2020 3/9/2020 3/10/2020 7/19/2020 1/10/2022   WBC      3.40 - 10.80 10*3/mm3 13.03 (H)  11.82 (H)  9.78  9.50  4.45    RBC      3.77 - 5.28 10*6/mm3 4.73  4.79  4.38  4.68  5.19    Hemoglobin      12.0 - 15.9 g/dL 15.0  15.3  13.8  14.7  14.4    Hematocrit      34.0 - 46.6 % 45.2  45.5  41.0  43.6  44.3    MCV      79.0 - 97.0 fL 95.6  95.0  93.6  93.2   85.4    MCH      26.6 - 33.0 pg 31.7  31.9  31.5  31.4  27.7    MCHC      31.5 - 35.7 g/dL 33.2  33.6  33.7  33.7  32.5    RDW      12.3 - 15.4 % 12.8  12.6  12.4  13.4  13.8    RDW-SD      37.0 - 54.0 fl 45.7  44.7  43.3  45.6     MPV      6.0 - 12.0 fL 8.4  8.7  8.7  9.3     Platelets      140 - 450 10*3/mm3 278  327  317  205  260    Neutrophil Rel %      42.7 - 76.0 % 85.0 (H)  81.4 (H)  77.4 (H)  77.3 (H)  59.6    Lymphocyte Rel %      19.6 - 45.3 % 7.8 (L)  10.9 (L)  14.0 (L)  11.8 (L)  26.5    Monocyte Rel %      5.0 - 12.0 % 6.4  6.9  7.8  9.5  10.6    Eosinophil Rel %      0.3 - 6.2 % 0.1 (L)  0.1 (L)  0.0 (L)  0.5  2.7    Basophil Rel %      0.0 - 1.5 % 0.2  0.3  0.2  0.5  0.4    Immature Granulocyte Rel %      0.0 - 0.5 % 0.5  0.4  0.6 (H)  0.4  0.2    Neutrophils Absolute      1.70 - 7.00 10*3/mm3 11.08 (H)  9.63 (H)  7.57 (H)  7.34 (H)  2.65    Lymphocytes Absolute      0.70 - 3.10 10*3/mm3 1.01  1.29  1.37  1.12  1.18    Monocytes Absolute      0.10 - 0.90 10*3/mm3 0.84  0.81  0.76  0.90  0.47    Eosinophils Absolute      0.00 - 0.40 10*3/mm3 0.01  0.01  0.00  0.05  0.12    Basophils Absolute      0.00 - 0.20 10*3/mm3 0.03  0.03  0.02  0.05  0.02    Immature Grans, Absolute      0.00 - 0.05 10*3/mm3 0.06 (H)  0.05  0.06 (H)  0.04  0.01    nRBC      0.0 - 0.2 /100 WBC 0.0  0.0  0.0  0.0  0.0      Component      Latest Ref Rng 1/25/2023 2/14/2023 10/6/2023   WBC      3.40 - 10.80 10*3/mm3 5.18  5.68  5.86    RBC      3.77 - 5.28 10*6/mm3 5.36 (H)  5.48 (H)  5.79 (H)    Hemoglobin      12.0 - 15.9 g/dL 14.6  14.8  18.3 (H)    Hematocrit      34.0 - 46.6 % 45.1  45.7  54.0 (H)    MCV      79.0 - 97.0 fL 84.1  83.4  93.3    MCH      26.6 - 33.0 pg 27.2  27.0  31.6    MCHC      31.5 - 35.7 g/dL 32.4  32.4  33.9    RDW      12.3 - 15.4 % 15.1  16.6 (H)  13.2    RDW-SD      37.0 - 54.0 fl  50.1     MPV      6.0 - 12.0 fL  9.0     Platelets      140 - 450 10*3/mm3 245  248  266    Neutrophil Rel %      42.7 -  76.0 % 51.7  65.4  63.8    Lymphocyte Rel %      19.6 - 45.3 % 26.1  25.4  26.6    Monocyte Rel %      5.0 - 12.0 % 19.7 (H)  8.1  7.5    Eosinophil Rel %      0.3 - 6.2 % 1.7  0.4  1.0    Basophil Rel %      0.0 - 1.5 % 0.6  0.5  0.9    Immature Granulocyte Rel %      0.0 - 0.5 % 0.2  0.2  0.2    Neutrophils Absolute      1.70 - 7.00 10*3/mm3 2.68  3.72  3.74    Lymphocytes Absolute      0.70 - 3.10 10*3/mm3 1.35  1.44  1.56    Monocytes Absolute      0.10 - 0.90 10*3/mm3 1.02 (H)  0.46  0.44    Eosinophils Absolute      0.00 - 0.40 10*3/mm3 0.09  0.02  0.06    Basophils Absolute      0.00 - 0.20 10*3/mm3 0.03  0.03  0.05    Immature Grans, Absolute      0.00 - 0.05 10*3/mm3 0.01  0.01  0.01    nRBC      0.0 - 0.2 /100 WBC 0.0  0.0  0.0     Only had coffee on day of blood draw  Assessment / Plan      Assessment/Plan:     1. Pulmonary embolism, provoked in the context of oral contraceptives  2.  Family history of recurrent VTE in her father  -I reviewed the patient's medical history as outlined above.  She presented with a provoked VTE event in the context of oral contraceptive use.  Because of her family history she has been continued on indefinite anticoagulation.  We are going to proceed with a more comprehensive hypercoagulable work-up including the following:  -     Prothrombin gene mutation; Future  -     Anticardiolipin Antibody, IgG / M, Qn; Future  -     Beta-2 Glycoprotein Antibodies; Future  -     Iron Profile; Future  -     Ferritin; Future  -     CBC & Differential; Future  -     Peripheral Blood Smear; Future  -     Comprehensive Metabolic Panel; Future  -     Bilirubin, Direct; Future  -We will follow-up in 4 weeks with results.    3.  Polycythemia  -This is a new finding on her most recent blood work, although I do note that her blood counts had previously been in the high end of normal.  We will obtain repeat labs.  If polycythemia persists would recommend JAK2 testing, particularly in the context  of her underlying thrombotic history.  Discussed potential management strategy if primary polycythemia were confirmed.       Follow Up:   4 weeks      Reshma Baig MD  Hematology and Oncology     Time spent on the day of service was 45 minutes inclusive of time before, during, and after office visit on record review, medically appropriate history and physical, counseling patient, ordering tests, documenting in the medical record.

## 2023-10-31 LAB
ALBUMIN SERPL-MCNC: 4.7 G/DL (ref 3.5–5.2)
ALBUMIN/GLOB SERPL: 1.6 G/DL
ALP SERPL-CCNC: 60 U/L (ref 39–117)
ALT SERPL W P-5'-P-CCNC: 19 U/L (ref 1–33)
ANION GAP SERPL CALCULATED.3IONS-SCNC: 10 MMOL/L (ref 5–15)
AST SERPL-CCNC: 63 U/L (ref 1–32)
BASOPHILS # BLD AUTO: 0.04 10*3/MM3 (ref 0–0.2)
BASOPHILS NFR BLD AUTO: 0.7 % (ref 0–1.5)
BILIRUB CONJ SERPL-MCNC: 0.2 MG/DL (ref 0–0.3)
BILIRUB SERPL-MCNC: 1.4 MG/DL (ref 0–1.2)
BUN SERPL-MCNC: 21 MG/DL (ref 6–20)
BUN/CREAT SERPL: 19.8 (ref 7–25)
CALCIUM SPEC-SCNC: 10.2 MG/DL (ref 8.6–10.5)
CHLORIDE SERPL-SCNC: 102 MMOL/L (ref 98–107)
CO2 SERPL-SCNC: 25 MMOL/L (ref 22–29)
CREAT SERPL-MCNC: 1.06 MG/DL (ref 0.57–1)
DEPRECATED RDW RBC AUTO: 42.8 FL (ref 37–54)
EGFRCR SERPLBLD CKD-EPI 2021: 72.2 ML/MIN/1.73
EOSINOPHIL # BLD AUTO: 0.12 10*3/MM3 (ref 0–0.4)
EOSINOPHIL NFR BLD AUTO: 2.1 % (ref 0.3–6.2)
ERYTHROCYTE [DISTWIDTH] IN BLOOD BY AUTOMATED COUNT: 12.7 % (ref 12.3–15.4)
FERRITIN SERPL-MCNC: 42.8 NG/ML (ref 13–150)
GLOBULIN UR ELPH-MCNC: 2.9 GM/DL
GLUCOSE SERPL-MCNC: 86 MG/DL (ref 65–99)
HCT VFR BLD AUTO: 52.3 % (ref 34–46.6)
HGB BLD-MCNC: 18.4 G/DL (ref 12–15.9)
IMM GRANULOCYTES # BLD AUTO: 0.02 10*3/MM3 (ref 0–0.05)
IMM GRANULOCYTES NFR BLD AUTO: 0.4 % (ref 0–0.5)
IRON 24H UR-MRATE: 117 MCG/DL (ref 37–145)
IRON SATN MFR SERPL: 28 % (ref 20–50)
LAB AP CASE REPORT: NORMAL
LYMPHOCYTES # BLD AUTO: 1.4 10*3/MM3 (ref 0.7–3.1)
LYMPHOCYTES NFR BLD AUTO: 24.5 % (ref 19.6–45.3)
MCH RBC QN AUTO: 32.7 PG (ref 26.6–33)
MCHC RBC AUTO-ENTMCNC: 35.2 G/DL (ref 31.5–35.7)
MCV RBC AUTO: 92.9 FL (ref 79–97)
MONOCYTES # BLD AUTO: 0.46 10*3/MM3 (ref 0.1–0.9)
MONOCYTES NFR BLD AUTO: 8.1 % (ref 5–12)
NEUTROPHILS NFR BLD AUTO: 3.67 10*3/MM3 (ref 1.7–7)
NEUTROPHILS NFR BLD AUTO: 64.2 % (ref 42.7–76)
NRBC BLD AUTO-RTO: 0 /100 WBC (ref 0–0.2)
PATH REPORT.FINAL DX SPEC: NORMAL
PATHOLOGY REVIEW: YES
PLATELET # BLD AUTO: 227 10*3/MM3 (ref 140–450)
PMV BLD AUTO: 10 FL (ref 6–12)
POTASSIUM SERPL-SCNC: 4.3 MMOL/L (ref 3.5–5.2)
PROT SERPL-MCNC: 7.6 G/DL (ref 6–8.5)
RBC # BLD AUTO: 5.63 10*6/MM3 (ref 3.77–5.28)
SODIUM SERPL-SCNC: 137 MMOL/L (ref 136–145)
TIBC SERPL-MCNC: 419 MCG/DL (ref 298–536)
TRANSFERRIN SERPL-MCNC: 281 MG/DL (ref 200–360)
WBC NRBC COR # BLD: 5.71 10*3/MM3 (ref 3.4–10.8)

## 2023-11-01 LAB
CARDIOLIPIN IGG SER IA-ACNC: <9 GPL U/ML (ref 0–14)
CARDIOLIPIN IGM SER IA-ACNC: <9 MPL U/ML (ref 0–12)
FACTOR II, DNA ANALYSIS: NORMAL

## 2023-11-02 LAB
B2 GLYCOPROT1 IGA SER-ACNC: <9 GPI IGA UNITS (ref 0–25)
B2 GLYCOPROT1 IGG SER-ACNC: <9 GPI IGG UNITS (ref 0–20)
B2 GLYCOPROT1 IGM SER-ACNC: <9 GPI IGM UNITS (ref 0–32)

## 2023-11-06 ENCOUNTER — TELEPHONE (OUTPATIENT)
Dept: ONCOLOGY | Facility: CLINIC | Age: 33
End: 2023-11-06
Payer: COMMERCIAL

## 2023-11-06 NOTE — TELEPHONE ENCOUNTER
Notified patient per Dr. Baig.  Patient verbalized understanding and stated she will complete additional labs this week.

## 2023-11-06 NOTE — TELEPHONE ENCOUNTER
----- Message from Reshma Baig MD sent at 11/6/2023  8:04 AM EST -----  Regarding: Please call patient:  Please call patient: Polycythemia persists.  Additional labs are ordered.  She can have these drawn at her convenience.

## 2023-11-10 ENCOUNTER — LAB (OUTPATIENT)
Dept: LAB | Facility: HOSPITAL | Age: 33
End: 2023-11-10
Payer: COMMERCIAL

## 2023-11-10 DIAGNOSIS — I26.99 PULMONARY EMBOLISM WITHOUT ACUTE COR PULMONALE, UNSPECIFIED CHRONICITY, UNSPECIFIED PULMONARY EMBOLISM TYPE: Chronic | ICD-10-CM

## 2023-11-10 DIAGNOSIS — D75.1 POLYCYTHEMIA: ICD-10-CM

## 2023-11-10 PROCEDURE — 81279 JAK2 GENE TRGT SEQUENCE ALYS: CPT

## 2023-11-10 PROCEDURE — 81219 CALR GENE COM VARIANTS: CPT

## 2023-11-10 PROCEDURE — 81338 MPL GENE COMMON VARIANTS: CPT

## 2023-11-10 PROCEDURE — 81270 JAK2 GENE: CPT

## 2023-11-10 PROCEDURE — 82668 ASSAY OF ERYTHROPOIETIN: CPT | Performed by: INTERNAL MEDICINE

## 2023-11-10 PROCEDURE — 36415 COLL VENOUS BLD VENIPUNCTURE: CPT | Performed by: INTERNAL MEDICINE

## 2023-11-13 LAB — EPO SERPL-ACNC: 10.7 MIU/ML (ref 2.6–18.5)

## 2023-11-16 LAB
CALR EXON 9 MUT ANL BLD/T: NORMAL
CITATION REF LAB TEST: NORMAL
JAK2 GENE MUT ANL BLD/T: NORMAL
JAK2 P.V617F BLD/T QL: NORMAL
LAB DIRECTOR NAME PROVIDER: NORMAL
MPL GENE MUT TESTED MAR: NORMAL
REF LAB TEST METHOD: NORMAL
REFLEX: NORMAL
TEST PERFORMANCE INFO SPEC: NORMAL

## 2023-11-27 ENCOUNTER — OFFICE VISIT (OUTPATIENT)
Dept: ONCOLOGY | Facility: CLINIC | Age: 33
End: 2023-11-27
Payer: COMMERCIAL

## 2023-11-27 VITALS
HEART RATE: 78 BPM | DIASTOLIC BLOOD PRESSURE: 63 MMHG | SYSTOLIC BLOOD PRESSURE: 115 MMHG | RESPIRATION RATE: 16 BRPM | TEMPERATURE: 97.5 F | OXYGEN SATURATION: 99 % | HEIGHT: 67 IN | BODY MASS INDEX: 30.76 KG/M2 | WEIGHT: 196 LBS

## 2023-11-27 DIAGNOSIS — D75.1 POLYCYTHEMIA: Primary | ICD-10-CM

## 2023-11-27 PROCEDURE — 99214 OFFICE O/P EST MOD 30 MIN: CPT | Performed by: INTERNAL MEDICINE

## 2023-11-27 NOTE — PROGRESS NOTES
[facility]ology [facility] [place]  Office number [phone]    Fax number [phone]    Follow up     Date: 23      Patient Name: Christine Black  MRN: [id number]  : 1990    Primary care: Erika Curiel NP    Chief Complaint: VTE, polycythemia    History of Present Illness: Christine Black is a pleasant 33 y.o. female who presents today for evaluation of VTE.    In 2020 she presented with buttock pain and shoulder pain and was diagnosed with a DVT and PE.  She underwent thrombectomy with stent placement.  She had been recently initiated on OCPs in January of that year.  She had no preceding history of high risk travel or surgery.  She was initiated on Xarelto which she continues with good tolerance.  Her menses were heavy with a ParaGard, but are light since she had a Mirena placed in 2023.    She had a limited hypercoagulable panel obtained at the time of her presentationThis was notable for Antithrombin 3 levels which were normal; normal homocystine levels.  Protein C&S levels were normal.  Factor V Leiden gene mutation was negative.    Recently she was noted to have polycythemia with a hemoglobin of 18.  Notably, she had only had coffee on the morning of her blood draw.  Review of serial CBCs dating back to  show high normal hemoglobin on prior checks.    Notably she  has a family history of a provoked blood clot in her father at the age of 30 following a hip fracture with repair.  He also experienced a blood clot in his 60s with travel.  She also has a family history of early onset breast cancer in her paternal aunt at age 49.  No prior cancer genetic testing that she is aware of.    Interval history:  She is here for follow-up and discussion of test results.  She flew to [place] for Thanksgiving.  She experienced no chest pain, shortness of air, or leg swelling.  She provides additional family history that her dad was told he was polycythemic but this was ultimately  determined to be due to living in [place].  He has not required phlebotomy.  Her grandmother, mother, and sister all have elevated liver function tests.  She is not aware of any specific diagnosis.      Past Medical History:   Past Medical History:   Diagnosis Date    Clotting disorder 3/7/2020    Blood clot in leg and lung. Removal of blood clot in leg    Deep vein thrombosis 3/7/2020    Family history of blood clots     History of blood clots     Ovarian cyst     Pulmonary embolism 03/07/2020    Blood clots in right leg and lungs       Past Surgical History:   Past Surgical History:   Procedure Laterality Date    CARDIAC CATHETERIZATION Right 3/9/2020    Procedure: Peripheral angiography;  Surgeon: Margarito Yan MD;  Location: [facility] CATH INVASIVE LOCATION;  Service: Cardiovascular;  Laterality: Right;  Prone/ R. Popliteal Access    OTHER SURGICAL HISTORY      Blood clot removal- Right Leg         Family History:   Family History   Problem Relation Age of Onset    Breast cancer Paternal Aunt     Breast cancer Paternal Aunt        Social History:   Social History     Socioeconomic History    Marital status: Single   Tobacco Use    Smoking status: Never    Smokeless tobacco: Never   Vaping Use    Vaping Use: Never used   Substance and Sexual Activity    Alcohol use: Yes     Alcohol/week: 1.0 standard drink of alcohol     Types: 1 Glasses of wine per week    Drug use: Never    Sexual activity: Yes     Partners: Male     Birth control/protection: I.U.D.       Medications:     Current Outpatient Medications:     Levonorgestrel (MIRENA, 52 MG, IU), by Intrauterine route., Disp: , Rfl:     minocycline (MINOCIN,DYNACIN) 100 MG capsule, Take 1 capsule by mouth 2 (Two) Times a Day., Disp: 60 capsule, Rfl: 2    spironolactone (ALDACTONE) 100 MG tablet, Take 1.5 tablets by mouth Daily., Disp: 45 tablet, Rfl: 2    tretinoin (RETIN-A) 0.025 % gel, Apply  topically to the appropriate area as directed Every  "Night., Disp: 20 g, Rfl: 0    Xarelto 20 MG tablet, TAKE ONE TABLET BY MOUTH DAILY, Disp: 90 tablet, Rfl: 3    Allergies:   No Known Allergies    Objective     Vital Signs:   Vitals:    11/27/23 1021   BP: 115/63   Pulse: 78   Resp: 16   Temp: 97.5 °F (36.4 °C)   TempSrc: Temporal   SpO2: 99%   Weight: 88.9 kg (196 lb)   Height: 170.2 cm (67\")   PainSc: 0-No pain    Body mass index is 30.7 kg/m².   Pain Score    11/27/23 1021   PainSc: 0-No pain       Physical Exam:   General: No acute distress. Well appearing   HEENT: Normocephalic, atraumatic. Sclera anicteric.   Neck: supple, no adenopathy.   Cardiovascular: regular rate and rhythm. No murmurs.   Respiratory: Normal rate. Clear to auscultation bilaterally.  Abdomen: Soft, nontender, non distended with normoactive bowel sounds.   Lymph: no cervical, supraclavicular or axillary adenopathy.  Neuro: Alert and oriented x 3. No focal deficits.   Ext: Symmetric, no swelling.   Psych: Euthymic      Laboratory/Imaging Reviewed:   No visits with results within 2 Week(s) from this visit.   Latest known visit with results is:   Lab on 11/10/2023   Component Date Value Ref Range Status    JAK2 V617F Result 11/10/2023 Comment   Final    NEGATIVE  The JAK2 V617F mutation is not detected in the provided specimen  of this individual. Results should be interpreted in conjunction  with clinical and other laboratory findings for the most accurate  interpretation.    Reflex 11/10/2023 Comment   Final    Reflex to CALR Mutation Analysis, JAK2 Exon 12-15 Mutation Analysis,  and MPL Mutation Analysis is indicated.    V617F Rfx CALR/MPL/E12-15 Bkgd 11/10/2023 Comment   Final    Comment: Molecular testing of blood or bone marrow is useful in the  evaluation of suspected myeloproliferative neoplasms (MPN).  Mutations in the JAK2, MPL, and CALR genes are present in  virtually all MPNs and their presence help distinguish benign  reactive processes from clonal neoplasms. These mutations " are  generally considered mutually exclusive, although concurrent  clones have been reported in rare patients. This test will assess  for the [id number] (exon 14) mutation first and will reflex to  CALR mutation analysis, MPL mutation analysis, and JAK2 exon 12  to 15 mutation analysis if the [id number] mutation is negative.  The JAK2 (Janus kinase 2) gene encodes for a non-receptor protein  tyrosine kinase that activates cytokine and growth factor  signaling. The V617F (c.1849 G>T) mutation results in  constitutive activation of JAK2 and downstream STAT5 and ERK  signaling. The V617F mutation is observed in approximately 95% of  polycythemia vera (PV), 60% of essential thrombocythemia (ET)                            and  primary myelofibrosis (PMF). It is also infrequently present  (3-5%) in myelodysplastic syndrome, chronic myelomonocytic  leukemia, and other atypical chronic myeloid disorders. A small  percentage of JAK2 mutation positive patients (3.3%) contain  other non-V617F mutations within exons 12 to 15. In particular,  mutations in exon 12 of JAK2 have been described in approximately  3% of patients with PV. JAK2 allele burden correlates with  clinical phenotype, with low levels of mutant allele  characterized by thrombocytosis, intermediate levels with  erythrocytosis, and high mutant allele burden correlating with  enhanced myelopoiesis of the BM, leukocytosis, increasing spleen  size, and circulating CD34-positive cells.  The CALR (Calreticulin) gene encodes for a multifunctional  calcium-binding protein involved in many cellular activities such  as growth, proliferation, adhesion, and programmed cell death.  Among patients with JAK2 negative MPNs, CALR are found in  approximately 70%                            of patients with JAK2-negative essential  thrombocythemia (ET) and 60-88% of patients with JAK2-negative  primary myelofibrosis(PMF). Only a minority of patients  (approximately 8%) with  myelodysplasia have mutations in the CALR  gene. CALR mutations are rarely detected in patients with de hattie  acute myeloid leukemia, chronic myelogenous leukemia, lymphoid  leukemia, or solid tumors. CALR mutations are not detected in  polycythemia and generally appear to be mutually exclusive with  JAK2 mutations and MPL mutations. The majority of mutational  changes involve a variety of insertion deletion mutations in exon  9 of the calreticulin gene: approximately 53% of all CALR  mutations are a 52 bp deletion (type-1) while the second most  prevalent mutation (approximately 32%) contains a 5 bp insertion  (type-2). Other mutations (non-type 1 or type 2) are seen in a  small minority of cases. CALR mutations in PMF tend to be  with a favorable prognosis compared to JAK2 V617F mutations,  whereas                            primary myelofibrosis negative for CALR, JAK2 V617F and  MPL mutations (so-called triple negative) is associated with a  poor prognosis and shorter survival.  The MPL (myeloproliferative leukemia virus oncogene) gene encodes  the thrombopoietin receptor which regulates hematopoiesis and  megakaryopoiesis. Activating MPL mutations are associated with a  subset of myeloproliferative neoplasms and acute megakaryoblastic  leukemia. MPL W515 mutations are present in approximately 5-8% of  patients with primary myelofibrosis (PMF) and 1-4% of patients  with essential thrombocythemia (ET). The S505 mutation is  detected in patients with hereditary thrombocythemia.  Limitations  This assay has a sensitivity of approximately 1% VAF for JAK2 V617F, 2.5%  VAF for other mutations in JAK2 exons 12 to 15, CALR mutations, and MPL  mutations.    Method 11/10/2023 Comment   Final    Amplicon-based next generation sequencing.    References: 11/10/2023 Comment   Final    Comment: Matthew N, Sujata Y, Brady H, Silvia S. Detection of  mutations in JAK2 exons 12-15 by [facility] sequencing. Int J Lab  Hematol.  2016 Feb;38(1):34-41. doi: 10.1111/ijlh.99536. Epub 2015  Sep 11. PMID: 35454566.  Shahid DA, José Miguel A, Juan J R, Adal J, Chris MJ, Janiya Van  MM, Shady CD, Blaise M, Yun JW. The 2016 revision to  the World Health Organization classification of myeloid neoplasms  and acute leukemia. Blood. 2016 May 19;127(20):2391-405. doi:  10.1182/mznws-[id number]. Epub 2016 Apr 11. PMID: 28676639.  Ma W, Phil H, Petr X, Charla CH, Petr GARCIAJ, Joe S,  Mari MCCALL. Mutation profile of JAK2 transcripts in patients with  chronic myeloproliferative neoplasias. J Mol Diagn. 2009 Jan;11(1):49-53.doi: 10.2353/jmoldx.2009.540471. Epub 2008 Dec 12.  PMID: 02157422; PMCID: ISI5733651.  NCCN Clinical Practice Guidelines in Oncology (NCCN Guidelines)  Myeloproliferative Neoplasms Version 3.2022 - August 11, 2022.  Gaurav SH, . WHO classification                            of Tumours of  Haematopoietic and Lymphoid Tissues. 4th edn. De Santiago, Avelina:  International Agency for Research on Cancer; 2017.  Radha TORREZ. Primary myelofibrosis: 2021 update on diagnosis,  risk-stratification and management. Am J Hematol. 2021 Jan;96(1):145-162. doi: 10.1002/ajh.51707. Epub 2020 Dec 2. PMID:  92540482.  Julianna OLIVER, Josue HIGGINS. Genetic basis and molecular  pathophysiology of classical myeloproliferative neoplasms. Blood.  2017 Feb 9;129(6):667-679. doi: 10.1182/eutbp-4475-49-183201.  Epub 2016 Dec 27. PMID: 69129139.    Director Review 11/10/2023 Comment   Final    Technical Component performed at [facility]  Professional Component performed by:  Jacinto Doan, PhD, [facility]  Director, Molecular Oncology  [address] [place] [address] [place] [address]  [phone]    CALR Mutation Analysis Result 11/10/2023 Comment   Final    NEGATIVE  No insertions or deletions were detected within the analyzed region  of the calreticulin (CALR) gene.  A negative result does not entirely exclude the possibility of a  clonal population carrying  CALR gene mutations that are not covered  by this assay. Results should be interpreted in conjunction with  clinical and laboratory findings for the most accurate interpretation.    MPL Mutation Result 11/10/2023 Comment   Final    NEGATIVE  No MPL mutation was identified in the provided specimen of this  individual. Results should be interpreted in conjunction with  clinical and other laboratory findings for the most accurate  interpretation.    E12-15 Result 11/10/2023 Comment   Final    NEGATIVE  JAK2 mutations were not detected in exons 12, 13, 14 and 15. This  result does not rule out the presence of JAK2 mutation at a level  below the detection sensitivity of this assay, the presence of  other mutations outside the analyzed region of the JAK2 gene, or  the presence of a myeloproliferative or other neoplasm. Result must  be correlated with other clinical data for the most accurate  diagnosis.     Protein C-Functional      73 - 180 % 120    Protein S Functional      63 - 140 % 111    TSH Baseline      0.270 - 4.200 uIU/mL 4.070    Factor V Leiden      Normal  Normal    Beta-2 Microglobulin      0.8 - 2.2 mg/L 1.7    Homocysteine, Plasma      0.0 - 15.0 umol/L 4.0    AntiThromb III Func      75 - 135 % 130     No results found.  Component      Latest Ref Rng 10/6/2023   WBC      3.40 - 10.80 10*3/mm3 5.86    RBC      3.77 - 5.28 10*6/mm3 5.79 (H)    Hemoglobin      12.0 - 15.9 g/dL 18.3 (H)    Hematocrit      34.0 - 46.6 % 54.0 (H)    MCV      79.0 - 97.0 fL 93.3    MCH      26.6 - 33.0 pg 31.6    MCHC      31.5 - 35.7 g/dL 33.9    RDW      12.3 - 15.4 % 13.2    Platelets      140 - 450 10*3/mm3 266    Neutrophil Rel %      42.7 - 76.0 % 63.8    Lymphocyte Rel %      19.6 - 45.3 % 26.6    Monocyte Rel %      5.0 - 12.0 % 7.5    Eosinophil Rel %      0.3 - 6.2 % 1.0    Basophil Rel %      0.0 - 1.5 % 0.9    Neutrophils Absolute      1.70 - 7.00 10*3/mm3 3.74    Lymphocytes Absolute      0.70 - 3.10 10*3/mm3 1.56     Monocytes Absolute      0.10 - 0.90 10*3/mm3 0.44    Eosinophils Absolute      0.00 - 0.40 10*3/mm3 0.06    Basophils Absolute      0.00 - 0.20 10*3/mm3 0.05    Immature Granulocyte Rel %      0.0 - 0.5 % 0.2    Immature Grans, Absolute      0.00 - 0.05 10*3/mm3 0.01    nRBC      0.0 - 0.2 /100 WBC 0.0    Glucose      65 - 99 mg/dL 81    BUN      6 - 20 mg/dL 15    Creatinine      0.57 - 1.00 mg/dL 0.94    EGFR Result      >60.0 mL/min/1.73 83.4    BUN/Creatinine Ratio      7.0 - 25.0  16.0    Sodium      136 - 145 mmol/L 140    Potassium      3.5 - 5.2 mmol/L 4.2    Chloride      98 - 107 mmol/L 102    CO2      22.0 - 29.0 mmol/L 25.4    Calcium      8.6 - 10.5 mg/dL 10.4    Total Protein      6.0 - 8.5 g/dL 7.5    Albumin      3.5 - 5.2 g/dL 5.1    Globulin      gm/dL 2.4    A/G Ratio      g/dL 2.1    Total Bilirubin      0.0 - 1.2 mg/dL 1.7 (H)    Alkaline Phosphatase      39 - 117 U/L 63    AST (SGOT)      1 - 32 U/L 75 (H)    ALT (SGPT)      1 - 33 U/L 20    Vitamin B-12      211 - 946 pg/mL 1,140 (H)    Folate      4.78 - 24.20 ng/mL 17.60    Iron      37 - 145 mcg/dL 168 (H)    Ferritin      13.00 - 150.00 ng/mL 28.60       Component      Latest Ref Rng 3/6/2020 3/9/2020 3/10/2020 7/19/2020 1/10/2022   WBC      3.40 - 10.80 10*3/mm3 13.03 (H)  11.82 (H)  9.78  9.50  4.45    RBC      3.77 - 5.28 10*6/mm3 4.73  4.79  4.38  4.68  5.19    Hemoglobin      12.0 - 15.9 g/dL 15.0  15.3  13.8  14.7  14.4    Hematocrit      34.0 - 46.6 % 45.2  45.5  41.0  43.6  44.3    MCV      79.0 - 97.0 fL 95.6  95.0  93.6  93.2  85.4    MCH      26.6 - 33.0 pg 31.7  31.9  31.5  31.4  27.7    MCHC      31.5 - 35.7 g/dL 33.2  33.6  33.7  33.7  32.5    RDW      12.3 - 15.4 % 12.8  12.6  12.4  13.4  13.8    RDW-SD      37.0 - 54.0 fl 45.7  44.7  43.3  45.6     MPV      6.0 - 12.0 fL 8.4  8.7  8.7  9.3     Platelets      140 - 450 10*3/mm3 278  327  317  205  260    Neutrophil Rel %      42.7 - 76.0 % 85.0 (H)  81.4 (H)  77.4 (H)   77.3 (H)  59.6    Lymphocyte Rel %      19.6 - 45.3 % 7.8 (L)  10.9 (L)  14.0 (L)  11.8 (L)  26.5    Monocyte Rel %      5.0 - 12.0 % 6.4  6.9  7.8  9.5  10.6    Eosinophil Rel %      0.3 - 6.2 % 0.1 (L)  0.1 (L)  0.0 (L)  0.5  2.7    Basophil Rel %      0.0 - 1.5 % 0.2  0.3  0.2  0.5  0.4    Immature Granulocyte Rel %      0.0 - 0.5 % 0.5  0.4  0.6 (H)  0.4  0.2    Neutrophils Absolute      1.70 - 7.00 10*3/mm3 11.08 (H)  9.63 (H)  7.57 (H)  7.34 (H)  2.65    Lymphocytes Absolute      0.70 - 3.10 10*3/mm3 1.01  1.29  1.37  1.12  1.18    Monocytes Absolute      0.10 - 0.90 10*3/mm3 0.84  0.81  0.76  0.90  0.47    Eosinophils Absolute      0.00 - 0.40 10*3/mm3 0.01  0.01  0.00  0.05  0.12    Basophils Absolute      0.00 - 0.20 10*3/mm3 0.03  0.03  0.02  0.05  0.02    Immature Grans, Absolute      0.00 - 0.05 10*3/mm3 0.06 (H)  0.05  0.06 (H)  0.04  0.01    nRBC      0.0 - 0.2 /100 WBC 0.0  0.0  0.0  0.0  0.0      Component      Latest Ref Rng 1/25/2023 2/14/2023 10/6/2023   WBC      3.40 - 10.80 10*3/mm3 5.18  5.68  5.86    RBC      3.77 - 5.28 10*6/mm3 5.36 (H)  5.48 (H)  5.79 (H)    Hemoglobin      12.0 - 15.9 g/dL 14.6  14.8  18.3 (H)    Hematocrit      34.0 - 46.6 % 45.1  45.7  54.0 (H)    MCV      79.0 - 97.0 fL 84.1  83.4  93.3    MCH      26.6 - 33.0 pg 27.2  27.0  31.6    MCHC      31.5 - 35.7 g/dL 32.4  32.4  33.9    RDW      12.3 - 15.4 % 15.1  16.6 (H)  13.2    RDW-SD      37.0 - 54.0 fl  50.1     MPV      6.0 - 12.0 fL  9.0     Platelets      140 - 450 10*3/mm3 245  248  266    Neutrophil Rel %      42.7 - 76.0 % 51.7  65.4  63.8    Lymphocyte Rel %      19.6 - 45.3 % 26.1  25.4  26.6    Monocyte Rel %      5.0 - 12.0 % 19.7 (H)  8.1  7.5    Eosinophil Rel %      0.3 - 6.2 % 1.7  0.4  1.0    Basophil Rel %      0.0 - 1.5 % 0.6  0.5  0.9    Immature Granulocyte Rel %      0.0 - 0.5 % 0.2  0.2  0.2    Neutrophils Absolute      1.70 - 7.00 10*3/mm3 2.68  3.72  3.74    Lymphocytes Absolute      0.70 - 3.10  10*3/mm3 1.35  1.44  1.56    Monocytes Absolute      0.10 - 0.90 10*3/mm3 1.02 (H)  0.46  0.44    Eosinophils Absolute      0.00 - 0.40 10*3/mm3 0.09  0.02  0.06    Basophils Absolute      0.00 - 0.20 10*3/mm3 0.03  0.03  0.05    Immature Grans, Absolute      0.00 - 0.05 10*3/mm3 0.01  0.01  0.01    nRBC      0.0 - 0.2 /100 WBC 0.0  0.0  0.0     Only had coffee on day of blood draw  Assessment / Plan      Assessment/Plan:     1. Pulmonary embolism, provoked in the context of oral contraceptives  2.  Family history of recurrent VTE in her father  -She presented with a provoked VTE event in the context of oral contraceptive use.  Workup for primary causes of thrombophilia included negative factor V Leiden mutation, normal levels of protein C and protein S.  Prothrombin gene mutation was negative.  Antithrombin levels were normal.  Anticardiolipin antibodies and beta-2 glycoprotein antibodies are negative.    -Despite no evidence of thrombophilia, she does have persistent polycythemia which certainly increases her risk for further thrombosis.  -Because of her family history she has been continued on indefinite anticoagulation prior to consultation.  Despite no evidence of thrombophilia and unprovoked cause, I would recommend continuation of anticoagulation at least until etiology of her polycythemia can be determined.    3.  Polycythemia  4.  Abnormal LFTs  -This is a persistent finding.  Reviewed negative JAK2, VANESSA-R, MPL, and exon 12 mutations.  Erythropoietin is normal at 10.7.  -She has no history of cardiopulmonary disease.  -She does have a family history of polycythemia, but no specific cause was identified in her father.  This does raise the possibility of an inherited autosomal dominant mutation due to polycythemia, particularly in the face of inappropriately normal erythropoietin.  This is confounded by the fact that [place] and was told this finding may be secondary to this.  -Her labs  were drawn while fasting.  If persistent hemoglobin elevation, consider additional testing for BCR-ABL, bone marrow biopsy, and if that workup is negative consideration of p50/ genetic testing for inherited causes of polycythemia.  There is a family history of elevated liver function enzymes.  We will repeat these.  Liver US was normal in 2022. Labs show normal iron saturation/ferritin/no evidence for iron overload.  Patient Instructions   Have non fasting labs drawn this week. Call in one week for results if you have not heard from us. If hemoglobin remains elevated, will consider bone marrow biopsy and additional labs. If improving will follow up in 3 months as scheduled     Follow Up:   4 weeks      Reshma Baig MD  Hematology and Oncology

## 2023-11-27 NOTE — PATIENT INSTRUCTIONS
Have non fasting labs drawn this week. Call in one week for results if you have not heard from us. If hemoglobin remains elevated, will consider bone marrow biopsy and additional labs. If improving will follow up in 3 months as scheduled

## 2023-11-28 ENCOUNTER — LAB (OUTPATIENT)
Dept: LAB | Facility: HOSPITAL | Age: 33
End: 2023-11-28
Payer: COMMERCIAL

## 2023-11-28 DIAGNOSIS — D75.1 POLYCYTHEMIA: ICD-10-CM

## 2023-11-28 LAB
ALBUMIN SERPL-MCNC: 4.6 G/DL (ref 3.5–5.2)
ALP SERPL-CCNC: 60 U/L (ref 39–117)
ALT SERPL W P-5'-P-CCNC: 19 U/L (ref 1–33)
ANION GAP SERPL CALCULATED.3IONS-SCNC: 9.4 MMOL/L (ref 5–15)
AST SERPL-CCNC: 59 U/L (ref 1–32)
BASOPHILS # BLD AUTO: 0.06 10*3/MM3 (ref 0–0.2)
BASOPHILS NFR BLD AUTO: 0.9 % (ref 0–1.5)
BILIRUB CONJ SERPL-MCNC: 0.2 MG/DL (ref 0–0.3)
BILIRUB INDIRECT SERPL-MCNC: 0.9 MG/DL
BILIRUB SERPL-MCNC: 1.1 MG/DL (ref 0–1.2)
BUN SERPL-MCNC: 23 MG/DL (ref 6–20)
BUN/CREAT SERPL: 29.1 (ref 7–25)
CALCIUM SPEC-SCNC: 9.8 MG/DL (ref 8.6–10.5)
CHLORIDE SERPL-SCNC: 101 MMOL/L (ref 98–107)
CO2 SERPL-SCNC: 27.6 MMOL/L (ref 22–29)
CREAT SERPL-MCNC: 0.79 MG/DL (ref 0.57–1)
DEPRECATED RDW RBC AUTO: 45.4 FL (ref 37–54)
EGFRCR SERPLBLD CKD-EPI 2021: 102.7 ML/MIN/1.73
EOSINOPHIL # BLD AUTO: 0.14 10*3/MM3 (ref 0–0.4)
EOSINOPHIL NFR BLD AUTO: 2.1 % (ref 0.3–6.2)
ERYTHROCYTE [DISTWIDTH] IN BLOOD BY AUTOMATED COUNT: 13.1 % (ref 12.3–15.4)
GLUCOSE SERPL-MCNC: 82 MG/DL (ref 65–99)
HCT VFR BLD AUTO: 51.2 % (ref 34–46.6)
HGB BLD-MCNC: 17.2 G/DL (ref 12–15.9)
IMM GRANULOCYTES # BLD AUTO: 0.01 10*3/MM3 (ref 0–0.05)
IMM GRANULOCYTES NFR BLD AUTO: 0.1 % (ref 0–0.5)
LYMPHOCYTES # BLD AUTO: 1.77 10*3/MM3 (ref 0.7–3.1)
LYMPHOCYTES NFR BLD AUTO: 26.3 % (ref 19.6–45.3)
MCH RBC QN AUTO: 31.6 PG (ref 26.6–33)
MCHC RBC AUTO-ENTMCNC: 33.6 G/DL (ref 31.5–35.7)
MCV RBC AUTO: 93.9 FL (ref 79–97)
MONOCYTES # BLD AUTO: 0.65 10*3/MM3 (ref 0.1–0.9)
MONOCYTES NFR BLD AUTO: 9.6 % (ref 5–12)
NEUTROPHILS NFR BLD AUTO: 4.11 10*3/MM3 (ref 1.7–7)
NEUTROPHILS NFR BLD AUTO: 61 % (ref 42.7–76)
NRBC BLD AUTO-RTO: 0 /100 WBC (ref 0–0.2)
PLATELET # BLD AUTO: 206 10*3/MM3 (ref 140–450)
PMV BLD AUTO: 9.6 FL (ref 6–12)
POTASSIUM SERPL-SCNC: 4.3 MMOL/L (ref 3.5–5.2)
PROT SERPL-MCNC: 8 G/DL (ref 6–8.5)
RBC # BLD AUTO: 5.45 10*6/MM3 (ref 3.77–5.28)
SODIUM SERPL-SCNC: 138 MMOL/L (ref 136–145)
WBC NRBC COR # BLD AUTO: 6.74 10*3/MM3 (ref 3.4–10.8)

## 2023-11-28 PROCEDURE — 85025 COMPLETE CBC W/AUTO DIFF WBC: CPT

## 2023-11-28 PROCEDURE — 80048 BASIC METABOLIC PNL TOTAL CA: CPT

## 2023-11-28 PROCEDURE — 36415 COLL VENOUS BLD VENIPUNCTURE: CPT

## 2023-11-28 PROCEDURE — 80076 HEPATIC FUNCTION PANEL: CPT

## 2023-12-01 ENCOUNTER — OFFICE VISIT (OUTPATIENT)
Dept: INTERNAL MEDICINE | Facility: CLINIC | Age: 33
End: 2023-12-01
Payer: COMMERCIAL

## 2023-12-01 VITALS
DIASTOLIC BLOOD PRESSURE: 68 MMHG | HEIGHT: 67 IN | SYSTOLIC BLOOD PRESSURE: 118 MMHG | OXYGEN SATURATION: 98 % | HEART RATE: 109 BPM | TEMPERATURE: 98 F | BODY MASS INDEX: 30.92 KG/M2 | WEIGHT: 197 LBS

## 2023-12-01 DIAGNOSIS — H61.23 EXCESSIVE CERUMEN IN BOTH EAR CANALS: ICD-10-CM

## 2023-12-01 DIAGNOSIS — E66.09 CLASS 1 OBESITY DUE TO EXCESS CALORIES WITH SERIOUS COMORBIDITY AND BODY MASS INDEX (BMI) OF 30.0 TO 30.9 IN ADULT: ICD-10-CM

## 2023-12-01 DIAGNOSIS — Z79.01 CURRENT USE OF LONG TERM ANTICOAGULATION: ICD-10-CM

## 2023-12-01 DIAGNOSIS — I27.82 CHRONIC PULMONARY EMBOLISM WITHOUT ACUTE COR PULMONALE, UNSPECIFIED PULMONARY EMBOLISM TYPE: ICD-10-CM

## 2023-12-01 DIAGNOSIS — Z00.00 ENCOUNTER FOR ANNUAL PHYSICAL EXAMINATION EXCLUDING GYNECOLOGICAL EXAMINATION IN A PATIENT OLDER THAN 17 YEARS: Primary | ICD-10-CM

## 2023-12-01 NOTE — PROGRESS NOTES
"    Chief Complaint   Patient presents with    Annual Exam       Christine Black is a 33 y.o. female and is here for a comprehensive physical exam.     H/O PE, blood clots.  She saw the hematologist and had 2 appointments with her. Hematology ordered labs for clotting disorders and she had \"hormones in her kidneys\". She had labs done again on Tuesday. Hematology might order a bone marrow biopsy depending on the results. Her iron level is back up. She is still taking Xarelto.    She has an Mirena implant. Her last period was about a week ago. Hematology knows about it. She started having periods when she was in fifth grade. She is sexually active with 1 male partner. She considers herself straight. Her last Pap smear was in 01/2023. She has never had an abnormal Pap smear before. She has never been pregnant before.     She is not taking any supplements. She is up to date on her dental exams. She is not up to date on her eye exams. She does not wear glasses. She has always had good vision. She exercises 5 days a week. She goes to the gym and does liquid weights. She is eating healthy.    She missed her appointment with the dermatologist. She is scheduled for an appointment on 01/12/2024. She has had a lot of wax in her ears since she was a child. She used to get them cleaned out by the doctor. She has not tried Debrox drops.    Health Maintenance   Topic Date Due    BMI FOLLOWUP  Never done    COVID-19 Vaccine (1) 12/26/2023 (Originally 1990)    INFLUENZA VACCINE  03/31/2024 (Originally 8/1/2023)    TDAP/TD VACCINES (1 - Tdap) 10/06/2024 (Originally 1/3/2011)    ANNUAL PHYSICAL  12/01/2024    PAP SMEAR  01/25/2026    HEPATITIS C SCREENING  Completed    Pneumococcal Vaccine 0-64  Aged Out       PMH, PSH, SocHx, FamHx, Allergies, and Medications: Reviewed and updated in the Visit Navigator.     No Known Allergies  Past Medical History:   Diagnosis Date    Clotting disorder 3/7/2020    Blood clot in leg and lung. " "Removal of blood clot in leg    Deep vein thrombosis 3/7/2020    Family history of blood clots     History of blood clots     Ovarian cyst     Pulmonary embolism 03/07/2020    Blood clots in right leg and lungs     Past Surgical History:   Procedure Laterality Date    CARDIAC CATHETERIZATION Right 3/9/2020    Procedure: Peripheral angiography;  Surgeon: Margarito Yan MD;  Location: [facility] CATH INVASIVE LOCATION;  Service: Cardiovascular;  Laterality: Right;  Prone/ R. Popliteal Access    OTHER SURGICAL HISTORY      Blood clot removal- Right Leg       Social History     Socioeconomic History    Marital status: Single   Tobacco Use    Smoking status: Never    Smokeless tobacco: Never   Vaping Use    Vaping Use: Never used   Substance and Sexual Activity    Alcohol use: Yes     Alcohol/week: 1.0 standard drink of alcohol     Types: 1 Glasses of wine per week    Drug use: Never    Sexual activity: Yes     Partners: Male     Birth control/protection: I.U.D.     Family History   Problem Relation Age of Onset    Breast cancer Paternal Aunt     Breast cancer Paternal Aunt        Review of Systems  Review of Systems   All other systems reviewed and are negative.      Objective   /68   Pulse 109   Temp 98 °F (36.7 °C)   Ht 170.2 cm (67\")   Wt 89.4 kg (197 lb)   SpO2 98%   BMI 30.85 kg/m²   Body mass index is 30.85 kg/m².    Physical Exam  Constitutional:       Appearance: She is well-developed. She is not ill-appearing.   HENT:      Head: Normocephalic.      Right Ear: Tympanic membrane, ear canal and external ear normal.      Left Ear: Tympanic membrane, ear canal and external ear normal.      Nose: Nose normal.      Mouth/Throat:      Mouth: Mucous membranes are moist.      Pharynx: Oropharynx is clear. Uvula midline.   Eyes:      Extraocular Movements: Extraocular movements intact.      Conjunctiva/sclera: Conjunctivae normal.      Pupils: Pupils are equal, round, and reactive to light. "   Neck:      Thyroid: No thyromegaly.   Cardiovascular:      Rate and Rhythm: Normal rate and regular rhythm.      Pulses:           Radial pulses are 2+ on the right side and 2+ on the left side.        Dorsalis pedis pulses are 2+ on the right side and 2+ on the left side.      Heart sounds: Normal heart sounds.   Pulmonary:      Effort: Pulmonary effort is normal.      Breath sounds: Normal breath sounds.   Abdominal:      General: Bowel sounds are normal.      Palpations: Abdomen is soft.      Tenderness: There is no abdominal tenderness.   Musculoskeletal:         General: No tenderness or deformity. Normal range of motion.      Cervical back: Full passive range of motion without pain, normal range of motion and neck supple.      Right lower leg: No edema.      Left lower leg: No edema.   Lymphadenopathy:      Cervical: No cervical adenopathy.   Skin:     General: Skin is warm.      Capillary Refill: Capillary refill takes less than 2 seconds.   Neurological:      Mental Status: She is alert and oriented to person, place, and time.      Sensory: No sensory deficit.      Coordination: Coordination normal.      Gait: Gait normal.      Comments: CN II-XII normal   Psychiatric:         Attention and Perception: Attention normal.         Mood and Affect: Mood and affect normal.         Speech: Speech normal.         Behavior: Behavior normal.         Thought Content: Thought content normal.         Ear Cerumen Removal    Date/Time: 12/1/2023 4:34 PM    Performed by: Erika Curiel APRN  Authorized by: Erika Curiel APRN  Consent: Verbal consent obtained. Written consent not obtained.  Risks and benefits: risks, benefits and alternatives were discussed  Consent given by: patient  Patient understanding: patient states understanding of the procedure being performed  Required items: required blood products, implants, devices, and special equipment available  Patient identity confirmed: verbally with  patient    Anesthesia:  Local Anesthetic: none  Location details: left ear and right ear  Patient tolerance: patient tolerated the procedure well with no immediate complications  Procedure type: irrigation   Sedation:  Patient sedated: no          The 10-year CVD risk score (PAVITHRA'Agoino, et al., 2008) is: 1%    Values used to calculate the score:      Age: 33 years      Sex: Female      Diabetic: No      Tobacco smoker: No      Systolic Blood Pressure: 118 mmHg      Is BP treated: No      HDL Cholesterol: 70 mg/dL      Total Cholesterol: 203 mg/dL      Assessment & Plan   1. Healthy female exam.    2. Patient Counseling: Including but not Limited to the following, when appropriate:  --Nutrition: Stressed importance of moderation in sodium/caffeine intake, saturated fat and cholesterol, caloric balance, sufficient intake of fresh fruits, vegetables, fiber, calcium, iron, and 1 mg of folate supplement per day (for females capable of pregnancy).  --Exercise: Stressed the importance of regular exercise.   --Substance Abuse: Discussed cessation/primary prevention of tobacco, alcohol, or other drug use; driving or other dangerous activities under the influence; availability of treatment for abuse, as indicated based on social history.    --Sexuality: Discussed sexually transmitted diseases, partner selection, use of condoms, avoidance of unintended pregnancy  and contraceptive alternatives.   --Injury prevention: Discussed safety belts, safety helmets, smoke detector, smoking near bedding or upholstery.   --Dental health: Discussed importance of regular tooth brushing, flossing, and dental visits.  --Immunizations reviewed.  3. Discussed the patient's BMI with her.  The BMI is above average; BMI management plan is completed  4. Return in about 1 year (around 12/1/2024) for Annual.  5. Age-appropriate Screening Scheduled    Assessment & Plan   1. Female physical.  Her blood pressure looks good. Her heart rate is a little  bit higher than normal.    2. Cerumen impaction.  We will clean her ears today. If we do not, she can use Debrox drops.    Diagnoses and all orders for this visit:    1. Encounter for annual physical examination excluding gynecological examination in a patient older than 17 years (Primary)    2. Chronic pulmonary embolism without acute cor pulmonale, unspecified pulmonary embolism type  3. Current use of long term anticoagulation        - Continue Xarelto as prescribed.  Keep scheduled appointments with hematology    4. Excessive cerumen in both ear canals  -     Ear Cerumen Removal    5. Class 1 obesity due to excess calories with serious comorbidity and body mass index (BMI) of 30.0 to 30.9 in adult        - BMI is >= 30 and <35. (Class 1 Obesity). The following options were offered after discussion;: exercise counseling/recommendations and nutrition counseling/recommendations                Transcribed from ambient dictation for MAVERICK Vasquez by Jean Claude Tucker.  12/01/23   16:12 EST    Patient or patient representative verbalized consent to the visit recording.  I have personally performed the services described in this document as transcribed by the above individual, and it is both accurate and complete.

## 2023-12-04 ENCOUNTER — PREP FOR SURGERY (OUTPATIENT)
Dept: OTHER | Facility: HOSPITAL | Age: 33
End: 2023-12-04
Payer: COMMERCIAL

## 2023-12-04 DIAGNOSIS — D75.1 POLYCYTHEMIA: Primary | ICD-10-CM

## 2023-12-05 ENCOUNTER — TELEPHONE (OUTPATIENT)
Dept: ONCOLOGY | Facility: CLINIC | Age: 33
End: 2023-12-05
Payer: COMMERCIAL

## 2023-12-05 NOTE — TELEPHONE ENCOUNTER
----- Message from Reshma Baig MD sent at 12/4/2023  6:58 AM EST -----  Regarding: Please call patient:  Please call patient: to arrange bone marrow biopsy. Let her know hemoglobin is persistently elevated. OK to hold eliquis 48 hours prior and resume evening of biopsy

## 2023-12-05 NOTE — TELEPHONE ENCOUNTER
Notified patient per Dr. Baig.  She agreed with plan.  Patient stated she takes Xarelto, not Eliquis.  Advised her hold instructions would be the same and Dr. Baig to be notified.  Notified Dr. Baig of patient agreeing to plan and that she takes Xarelto, not Eliquis.  Message sent to scheduling to get patient scheduled.

## 2023-12-18 ENCOUNTER — TELEPHONE (OUTPATIENT)
Dept: INFUSION THERAPY | Facility: HOSPITAL | Age: 33
End: 2023-12-18
Payer: COMMERCIAL

## 2023-12-18 NOTE — TELEPHONE ENCOUNTER
Verified appointment time, NPO, may take a.m. medications, did stop Xarelto 12-17 a.m.last dose. Procedure teaching reviewed. Pt expressed concern r/t previous embolectomy in [place] cath lab was painful and she felt like the sedation meds she received then were ineffective. Recommended she discuss with the radiologist pre- procedure.

## 2023-12-20 ENCOUNTER — HOSPITAL ENCOUNTER (OUTPATIENT)
Dept: INTERVENTIONAL RADIOLOGY/VASCULAR | Facility: HOSPITAL | Age: 33
Discharge: HOME | End: 2023-12-20
Payer: COMMERCIAL

## 2023-12-20 VITALS
OXYGEN SATURATION: 99 % | WEIGHT: 195.8 LBS | HEIGHT: 67 IN | TEMPERATURE: 97.9 F | RESPIRATION RATE: 14 BRPM | SYSTOLIC BLOOD PRESSURE: 103 MMHG | HEART RATE: 67 BPM | DIASTOLIC BLOOD PRESSURE: 58 MMHG | BODY MASS INDEX: 30.73 KG/M2

## 2023-12-20 DIAGNOSIS — D75.1 POLYCYTHEMIA: ICD-10-CM

## 2023-12-20 LAB
B-HCG UR QL: NEGATIVE
BASOPHILS # BLD AUTO: 0.03 10*3/MM3 (ref 0–0.2)
BASOPHILS NFR BLD AUTO: 0.9 % (ref 0–1.5)
DEPRECATED RDW RBC AUTO: 44.4 FL (ref 37–54)
EOSINOPHIL # BLD AUTO: 0.11 10*3/MM3 (ref 0–0.4)
EOSINOPHIL NFR BLD AUTO: 3.3 % (ref 0.3–6.2)
ERYTHROCYTE [DISTWIDTH] IN BLOOD BY AUTOMATED COUNT: 12.6 % (ref 12.3–15.4)
HCT VFR BLD AUTO: 55 % (ref 34–46.6)
HGB BLD-MCNC: 18.5 G/DL (ref 12–15.9)
IMM GRANULOCYTES # BLD AUTO: 0 10*3/MM3 (ref 0–0.05)
IMM GRANULOCYTES NFR BLD AUTO: 0 % (ref 0–0.5)
LYMPHOCYTES # BLD AUTO: 1.12 10*3/MM3 (ref 0.7–3.1)
LYMPHOCYTES NFR BLD AUTO: 33.3 % (ref 19.6–45.3)
MCH RBC QN AUTO: 31.9 PG (ref 26.6–33)
MCHC RBC AUTO-ENTMCNC: 33.6 G/DL (ref 31.5–35.7)
MCV RBC AUTO: 94.8 FL (ref 79–97)
MONOCYTES # BLD AUTO: 0.29 10*3/MM3 (ref 0.1–0.9)
MONOCYTES NFR BLD AUTO: 8.6 % (ref 5–12)
NEUTROPHILS NFR BLD AUTO: 1.81 10*3/MM3 (ref 1.7–7)
NEUTROPHILS NFR BLD AUTO: 53.9 % (ref 42.7–76)
NRBC BLD AUTO-RTO: 0 /100 WBC (ref 0–0.2)
PLAT MORPH BLD: NORMAL
PLATELET # BLD AUTO: 155 10*3/MM3 (ref 140–450)
PMV BLD AUTO: 9.8 FL (ref 6–12)
RBC # BLD AUTO: 5.8 10*6/MM3 (ref 3.77–5.28)
RBC MORPH BLD: NORMAL
WBC MORPH BLD: NORMAL
WBC NRBC COR # BLD AUTO: 3.36 10*3/MM3 (ref 3.4–10.8)

## 2023-12-20 PROCEDURE — 38221 DX BONE MARROW BIOPSIES: CPT

## 2023-12-20 PROCEDURE — 25010000002 FENTANYL CITRATE (PF) 50 MCG/ML SOLUTION: Performed by: RADIOLOGY

## 2023-12-20 PROCEDURE — 25010000002 LIDOCAINE 1 % SOLUTION: Performed by: INTERNAL MEDICINE

## 2023-12-20 PROCEDURE — 88311 DECALCIFY TISSUE: CPT | Performed by: INTERNAL MEDICINE

## 2023-12-20 PROCEDURE — 85025 COMPLETE CBC W/AUTO DIFF WBC: CPT | Performed by: RADIOLOGY

## 2023-12-20 PROCEDURE — 81025 URINE PREGNANCY TEST: CPT | Performed by: RADIOLOGY

## 2023-12-20 PROCEDURE — 77002 NEEDLE LOCALIZATION BY XRAY: CPT

## 2023-12-20 PROCEDURE — 88305 TISSUE EXAM BY PATHOLOGIST: CPT | Performed by: INTERNAL MEDICINE

## 2023-12-20 PROCEDURE — 88313 SPECIAL STAINS GROUP 2: CPT | Performed by: INTERNAL MEDICINE

## 2023-12-20 PROCEDURE — 85007 BL SMEAR W/DIFF WBC COUNT: CPT | Performed by: RADIOLOGY

## 2023-12-20 RX ORDER — SODIUM CHLORIDE 0.9 % (FLUSH) 0.9 %
10 SYRINGE (ML) INJECTION AS NEEDED
Status: DISCONTINUED | OUTPATIENT
Start: 2023-12-20 | End: 2023-12-21 | Stop reason: HOSPADM

## 2023-12-20 RX ORDER — SODIUM CHLORIDE 0.9 % (FLUSH) 0.9 %
10 SYRINGE (ML) INJECTION EVERY 12 HOURS SCHEDULED
Status: DISCONTINUED | OUTPATIENT
Start: 2023-12-20 | End: 2023-12-21 | Stop reason: HOSPADM

## 2023-12-20 RX ORDER — SODIUM CHLORIDE 9 MG/ML
40 INJECTION, SOLUTION INTRAVENOUS AS NEEDED
Status: DISCONTINUED | OUTPATIENT
Start: 2023-12-20 | End: 2023-12-21 | Stop reason: HOSPADM

## 2023-12-20 RX ORDER — FENTANYL CITRATE 50 UG/ML
INJECTION, SOLUTION INTRAMUSCULAR; INTRAVENOUS
Status: DISPENSED
Start: 2023-12-20 | End: 2023-12-20

## 2023-12-20 RX ORDER — LIDOCAINE HYDROCHLORIDE 10 MG/ML
10 INJECTION, SOLUTION INFILTRATION; PERINEURAL ONCE
Status: COMPLETED | OUTPATIENT
Start: 2023-12-20 | End: 2023-12-20

## 2023-12-20 RX ORDER — FENTANYL CITRATE 50 UG/ML
INJECTION, SOLUTION INTRAMUSCULAR; INTRAVENOUS AS NEEDED
Status: COMPLETED | OUTPATIENT
Start: 2023-12-20 | End: 2023-12-20

## 2023-12-20 RX ADMIN — FENTANYL CITRATE 50 MCG: 50 INJECTION, SOLUTION INTRAMUSCULAR; INTRAVENOUS at 11:05

## 2023-12-20 RX ADMIN — FENTANYL CITRATE 50 MCG: 50 INJECTION, SOLUTION INTRAMUSCULAR; INTRAVENOUS at 11:04

## 2023-12-20 RX ADMIN — LIDOCAINE HYDROCHLORIDE 10 ML: 10 INJECTION, SOLUTION INFILTRATION; PERINEURAL at 11:18

## 2023-12-20 NOTE — NURSING NOTE
Pt discharged from ir dept s/p bone marrow biopsy. Pt tolerated procedure without complications although did report some pain during procedure. Due to previous possible reaction to Versed when sedation for another procedure, she was medicated with Fentanyl only. Pt did complain of headache and mild nausea post procedure. Site remains without evidence of hematoma at time of discharge. Discharge instructions reviewed with patient and spouse, both verbalized understanding. Pt transported to exit via wheelchair per [facility].

## 2023-12-20 NOTE — DISCHARGE INSTRUCTIONS
Avoid any strenuous activities, pulling, tugging, straining or lifting anything over 10 pounds for the next 24 hours.     You may remove the bandaid (if you have one) tomorrow and shower tomorrow; but you will need to avoid tub baths or any situation where your are submersed in water for the next 4 or 5 days to avoid the risk of infection.     DO NOT DRIVE ON THE DAY OF YOUR PROCEDURE.    If you have any problems or concern please contact your physician's office.

## 2023-12-20 NOTE — NURSING NOTE
Image guided bone marrow biopsy performed by Dr Youssef. Sample obtained, labeled, and taken to lab per IR tech. Dressing to site per protocol. Patient tolerated well. Fentanyl 100 mcg given. A&Ox4, no distress noted. Report called to bethany Lott in [place].

## 2023-12-21 ENCOUNTER — TELEPHONE (OUTPATIENT)
Dept: INFUSION THERAPY | Facility: HOSPITAL | Age: 33
End: 2023-12-21
Payer: COMMERCIAL

## 2023-12-22 LAB
CYTO UR: NORMAL
LAB AP ASPIRATE SMEAR: NORMAL
LAB AP CASE REPORT: NORMAL
LAB AP CBC AND DIFFERENTIAL: NORMAL
LAB AP CLINICAL INFORMATION: NORMAL
LAB AP CLOT SECTION: NORMAL
LAB AP CORE BIOPSY: NORMAL
LAB AP DIAGNOSIS COMMENT: NORMAL
LAB AP FLOW CYTOMETRY SUMMARY: NORMAL
PATH REPORT.FINAL DX SPEC: NORMAL
PATH REPORT.GROSS SPEC: NORMAL

## 2024-01-08 ENCOUNTER — TELEPHONE (OUTPATIENT)
Dept: ONCOLOGY | Facility: CLINIC | Age: 34
End: 2024-01-08
Payer: COMMERCIAL

## 2024-01-08 NOTE — TELEPHONE ENCOUNTER
Patient stated she does want to move follow up with results up from 2-26-24.  Call sent to scheduling to get patient moved up.

## 2024-01-10 LAB
CYTO UR: NORMAL
LAB AP ASPIRATE SMEAR: NORMAL
LAB AP CASE REPORT: NORMAL
LAB AP CBC AND DIFFERENTIAL: NORMAL
LAB AP CLINICAL INFORMATION: NORMAL
LAB AP CLOT SECTION: NORMAL
LAB AP CORE BIOPSY: NORMAL
LAB AP CYTOGENETICS REPORT,ADDENDUM: NORMAL
LAB AP DIAGNOSIS COMMENT: NORMAL
LAB AP FLOW CYTOMETRY SUMMARY: NORMAL
PATH REPORT.FINAL DX SPEC: NORMAL
PATH REPORT.GROSS SPEC: NORMAL

## 2024-01-22 ENCOUNTER — OFFICE VISIT (OUTPATIENT)
Dept: ONCOLOGY | Facility: CLINIC | Age: 34
End: 2024-01-22
Payer: COMMERCIAL

## 2024-01-22 VITALS
DIASTOLIC BLOOD PRESSURE: 65 MMHG | OXYGEN SATURATION: 99 % | TEMPERATURE: 98 F | WEIGHT: 194 LBS | HEART RATE: 95 BPM | RESPIRATION RATE: 16 BRPM | SYSTOLIC BLOOD PRESSURE: 126 MMHG | HEIGHT: 67 IN | BODY MASS INDEX: 30.45 KG/M2

## 2024-01-22 DIAGNOSIS — D75.1 POLYCYTHEMIA: Primary | ICD-10-CM

## 2024-01-22 PROCEDURE — 99214 OFFICE O/P EST MOD 30 MIN: CPT | Performed by: INTERNAL MEDICINE

## 2024-01-22 RX ORDER — CEPHALEXIN 500 MG/1
500 CAPSULE ORAL 2 TIMES DAILY
COMMUNITY

## 2024-01-22 NOTE — PROGRESS NOTES
[facility]  Office number [phone]    Fax number [phone]    Follow up     Date: 24      Patient Name: Christine Black  MRN: [id number]  : 1990    Primary care: Erika Curiel NP    Chief Complaint: VTE, polycythemia    History of Present Illness: Christine Black is a pleasant 34 y.o. female who presents today for evaluation of VTE.    In 2020 she presented with buttock pain and shoulder pain and was diagnosed with a DVT and PE.  She underwent thrombectomy with stent placement.  She had been recently initiated on OCPs in January of that year.  She had no preceding history of high risk travel or surgery.  She was initiated on Xarelto which she continues with good tolerance.  Her menses were heavy with a ParaGard, but are light since she had a Mirena placed in 2023.    She had a limited hypercoagulable panel obtained at the time of her presentationThis was notable for Antithrombin 3 levels which were normal; normal homocystine levels.  Protein C&S levels were normal.  Factor V Leiden gene mutation was negative.    Recently she was noted to have polycythemia with a hemoglobin of 18.  Notably, she had only had coffee on the morning of her blood draw.  Review of serial CBCs dating back to  show high normal hemoglobin on prior checks.    Notably she  has a family history of a provoked blood clot in her father at the age of 30 following a hip fracture with repair.  He also experienced a blood clot in his 60s with travel.  She also has a family history of early onset breast cancer in her paternal aunt at age 49.  No prior cancer genetic testing that she is aware of.    She provides additional family history that her dad was told he was polycythemic but this was ultimately determined to be due to living in [place].  He has not required phlebotomy.  Her grandmother, mother, and sister all have elevated liver function tests.  She is not aware of any  specific diagnosis.    Interval history:  Was diagnosed with influenza and took Tamiflu 1/5/24. Fully recovered with excision of mild lingering cough.  No recent travel to [facility].  Dermatologist changed her from minocycline to Keflex last week.   Taking Xarelto, no missed doses, endorses daily compliance.   She tolerated bone marrow biopsy well.  No postprocedure complications.  Bone marrow biopsy from 12/20/2023 shows normocellular bone marrow for age with adequate trilineage hematopoiesis and no evidence of dysplasia, fibrosis, increased blasts, or and lymphoma.  Cytogenetics negative.    Past Medical History:   Past Medical History:   Diagnosis Date    Clotting disorder 3/7/2020    Blood clot in leg and lung. Removal of blood clot in leg    Deep vein thrombosis 3/7/2020    Family history of blood clots     History of blood clots     Ovarian cyst     Pulmonary embolism 03/07/2020    Blood clots in right leg and lungs       Past Surgical History:   Past Surgical History:   Procedure Laterality Date    CARDIAC CATHETERIZATION Right 3/9/2020    Procedure: Peripheral angiography;  Surgeon: Margarito Yan MD;  Location: [facility] CATH INVASIVE LOCATION;  Service: Cardiovascular;  Laterality: Right;  Prone/ R. Popliteal Access    OTHER SURGICAL HISTORY      Blood clot removal- Right Leg         Family History:   Family History   Problem Relation Age of Onset    Breast cancer Paternal Aunt     Breast cancer Paternal Aunt        Social History:   Social History     Socioeconomic History    Marital status: Single   Tobacco Use    Smoking status: Never    Smokeless tobacco: Never   Vaping Use    Vaping Use: Never used   Substance and Sexual Activity    Alcohol use: Yes     Alcohol/week: 1.0 standard drink of alcohol     Types: 1 Glasses of wine per week    Drug use: Never    Sexual activity: Yes     Partners: Male     Birth control/protection: I.U.D.       Medications:     Current Outpatient  "Medications:     cephalexin (KEFLEX) 500 MG capsule, Take 1 capsule by mouth 2 (Two) Times a Day., Disp: , Rfl:     Levonorgestrel (MIRENA, 52 MG, IU), by Intrauterine route., Disp: , Rfl:     spironolactone (ALDACTONE) 100 MG tablet, Take 1.5 tablets by mouth Daily., Disp: 45 tablet, Rfl: 2    tretinoin (RETIN-A) 0.025 % gel, Apply  topically to the appropriate area as directed Every Night., Disp: 20 g, Rfl: 0    Xarelto 20 MG tablet, TAKE ONE TABLET BY MOUTH DAILY (Patient taking differently: 1 tablet. Last dose 12-17-23), Disp: 90 tablet, Rfl: 3    Allergies:   No Known Allergies    Objective     Vital Signs:   Vitals:    01/22/24 0911   BP: 126/65   Pulse: 95   Resp: 16   Temp: 98 °F (36.7 °C)   SpO2: 99%   Weight: 88 kg (194 lb)   Height: 170.2 cm (67\")   PainSc: 0-No pain    Body mass index is 30.38 kg/m².   Pain Score    01/22/24 0911   PainSc: 0-No pain       Physical Exam:   General: No acute distress. Well appearing   HEENT: Normocephalic, atraumatic. Sclera anicteric.   Neck: supple, no adenopathy.   Cardiovascular: regular rate and rhythm. No murmurs.   Respiratory: Normal rate. Clear to auscultation bilaterally.  Abdomen: Soft, nontender, non distended with normoactive bowel sounds.   Lymph: no cervical, supraclavicular or axillary adenopathy.  Neuro: Alert and oriented x 3. No focal deficits.   Ext: Symmetric, no swelling.   Psych: Euthymic      Laboratory/Imaging Reviewed:   No visits with results within 2 Week(s) from this visit.   Latest known visit with results is:   Hospital Outpatient Visit on 12/20/2023   Component Date Value Ref Range Status    HCG, Urine QL 12/20/2023 Negative  Negative Final    WBC 12/20/2023 3.36 (L)  3.40 - 10.80 10*3/mm3 Final    RBC 12/20/2023 5.80 (H)  3.77 - 5.28 10*6/mm3 Final    Hemoglobin 12/20/2023 18.5 (H)  12.0 - 15.9 g/dL Final    Hematocrit 12/20/2023 55.0 (H)  34.0 - 46.6 % Final    MCV 12/20/2023 94.8  79.0 - 97.0 fL Final    MCH 12/20/2023 31.9  26.6 - 33.0 " pg Final    MCHC 12/20/2023 33.6  31.5 - 35.7 g/dL Final    RDW 12/20/2023 12.6  12.3 - 15.4 % Final    RDW-SD 12/20/2023 44.4  37.0 - 54.0 fl Final    MPV 12/20/2023 9.8  6.0 - 12.0 fL Final    Platelets 12/20/2023 155  140 - 450 10*3/mm3 Final    Neutrophil % 12/20/2023 53.9  42.7 - 76.0 % Final    Lymphocyte % 12/20/2023 33.3  19.6 - 45.3 % Final    Monocyte % 12/20/2023 8.6  5.0 - 12.0 % Final    Eosinophil % 12/20/2023 3.3  0.3 - 6.2 % Final    Basophil % 12/20/2023 0.9  0.0 - 1.5 % Final    Immature Grans % 12/20/2023 0.0  0.0 - 0.5 % Final    Neutrophils, Absolute 12/20/2023 1.81  1.70 - 7.00 10*3/mm3 Final    Lymphocytes, Absolute 12/20/2023 1.12  0.70 - 3.10 10*3/mm3 Final    Monocytes, Absolute 12/20/2023 0.29  0.10 - 0.90 10*3/mm3 Final    Eosinophils, Absolute 12/20/2023 0.11  0.00 - 0.40 10*3/mm3 Final    Basophils, Absolute 12/20/2023 0.03  0.00 - 0.20 10*3/mm3 Final    Immature Grans, Absolute 12/20/2023 0.00  0.00 - 0.05 10*3/mm3 Final    nRBC 12/20/2023 0.0  0.0 - 0.2 /100 WBC Final    RBC Morphology 12/20/2023 Normal  Normal Final    WBC Morphology 12/20/2023 Normal  Normal Final    Platelet Morphology 12/20/2023 Normal  Normal Final    Case Report 12/20/2023    Final                    Value:Surgical Pathology Report                         Case: [id number]                                  Authorizing Provider:  Reshma Baig MD        Collected:           12/20/2023 11:16 AM          Ordering Location:     [facility]   Received:            12/20/2023 11:36 AM                                 INTERVENTIONAL RADIOLOGY                                                     Pathologist:           Noni Suazo MD                                                           Specimens:   1) - Iliac Crest, Right - Biopsy                                                                    2) - Iliac Crest, Right - Aspirate                                                         Cytogenetics  Report, Addendum 12/20/2023    Final                    Value:This result contains rich text formatting which cannot be displayed here.    Clinical Information 12/20/2023    Final                    Value:This result contains rich text formatting which cannot be displayed here.    Final Diagnosis 12/20/2023    Final                    Value:This result contains rich text formatting which cannot be displayed here.    Comment 12/20/2023    Final                    Value:This result contains rich text formatting which cannot be displayed here.    Gross Description 12/20/2023    Final                    Value:This result contains rich text formatting which cannot be displayed here.    Microscopic Description 12/20/2023    Final                    Value:This result contains rich text formatting which cannot be displayed here.    Flow Cytometry Summary 12/20/2023    Final                    Value:This result contains rich text formatting which cannot be displayed here.    CBC and Differential 12/20/2023    Final                    Value:This result contains rich text formatting which cannot be displayed here.    Aspirate Smear 12/20/2023    Final                    Value:This result contains rich text formatting which cannot be displayed here.    Core Biopsy  12/20/2023    Final                    Value:This result contains rich text formatting which cannot be displayed here.    Clot Section 12/20/2023    Final                    Value:This result contains rich text formatting which cannot be displayed here.     Protein C-Functional      73 - 180 % 120    Protein S Functional      63 - 140 % 111    TSH Baseline      0.270 - 4.200 uIU/mL 4.070    Factor V Leiden      Normal  Normal    Beta-2 Microglobulin      0.8 - 2.2 mg/L 1.7    Homocysteine, Plasma      0.0 - 15.0 umol/L 4.0    AntiThromb III Func      75 - 135 % 130     No results found.  Component      Latest Ref Rng 10/6/2023   WBC      3.40 - 10.80 10*3/mm3 5.86     RBC      3.77 - 5.28 10*6/mm3 5.79 (H)    Hemoglobin      12.0 - 15.9 g/dL 18.3 (H)    Hematocrit      34.0 - 46.6 % 54.0 (H)    MCV      79.0 - 97.0 fL 93.3    MCH      26.6 - 33.0 pg 31.6    MCHC      31.5 - 35.7 g/dL 33.9    RDW      12.3 - 15.4 % 13.2    Platelets      140 - 450 10*3/mm3 266    Neutrophil Rel %      42.7 - 76.0 % 63.8    Lymphocyte Rel %      19.6 - 45.3 % 26.6    Monocyte Rel %      5.0 - 12.0 % 7.5    Eosinophil Rel %      0.3 - 6.2 % 1.0    Basophil Rel %      0.0 - 1.5 % 0.9    Neutrophils Absolute      1.70 - 7.00 10*3/mm3 3.74    Lymphocytes Absolute      0.70 - 3.10 10*3/mm3 1.56    Monocytes Absolute      0.10 - 0.90 10*3/mm3 0.44    Eosinophils Absolute      0.00 - 0.40 10*3/mm3 0.06    Basophils Absolute      0.00 - 0.20 10*3/mm3 0.05    Immature Granulocyte Rel %      0.0 - 0.5 % 0.2    Immature Grans, Absolute      0.00 - 0.05 10*3/mm3 0.01    nRBC      0.0 - 0.2 /100 WBC 0.0    Glucose      65 - 99 mg/dL 81    BUN      6 - 20 mg/dL 15    Creatinine      0.57 - 1.00 mg/dL 0.94    EGFR Result      >60.0 mL/min/1.73 83.4    BUN/Creatinine Ratio      7.0 - 25.0  16.0    Sodium      136 - 145 mmol/L 140    Potassium      3.5 - 5.2 mmol/L 4.2    Chloride      98 - 107 mmol/L 102    CO2      22.0 - 29.0 mmol/L 25.4    Calcium      8.6 - 10.5 mg/dL 10.4    Total Protein      6.0 - 8.5 g/dL 7.5    Albumin      3.5 - 5.2 g/dL 5.1    Globulin      gm/dL 2.4    A/G Ratio      g/dL 2.1    Total Bilirubin      0.0 - 1.2 mg/dL 1.7 (H)    Alkaline Phosphatase      39 - 117 U/L 63    AST (SGOT)      1 - 32 U/L 75 (H)    ALT (SGPT)      1 - 33 U/L 20    Vitamin B-12      211 - 946 pg/mL 1,140 (H)    Folate      4.78 - 24.20 ng/mL 17.60    Iron      37 - 145 mcg/dL 168 (H)    Ferritin      13.00 - 150.00 ng/mL 28.60       Component      Latest Ref Rng 3/6/2020 3/9/2020 3/10/2020 7/19/2020 1/10/2022   WBC      3.40 - 10.80 10*3/mm3 13.03 (H)  11.82 (H)  9.78  9.50  4.45    RBC      3.77 - 5.28  10*6/mm3 4.73  4.79  4.38  4.68  5.19    Hemoglobin      12.0 - 15.9 g/dL 15.0  15.3  13.8  14.7  14.4    Hematocrit      34.0 - 46.6 % 45.2  45.5  41.0  43.6  44.3    MCV      79.0 - 97.0 fL 95.6  95.0  93.6  93.2  85.4    MCH      26.6 - 33.0 pg 31.7  31.9  31.5  31.4  27.7    MCHC      31.5 - 35.7 g/dL 33.2  33.6  33.7  33.7  32.5    RDW      12.3 - 15.4 % 12.8  12.6  12.4  13.4  13.8    RDW-SD      37.0 - 54.0 fl 45.7  44.7  43.3  45.6     MPV      6.0 - 12.0 fL 8.4  8.7  8.7  9.3     Platelets      140 - 450 10*3/mm3 278  327  317  205  260    Neutrophil Rel %      42.7 - 76.0 % 85.0 (H)  81.4 (H)  77.4 (H)  77.3 (H)  59.6    Lymphocyte Rel %      19.6 - 45.3 % 7.8 (L)  10.9 (L)  14.0 (L)  11.8 (L)  26.5    Monocyte Rel %      5.0 - 12.0 % 6.4  6.9  7.8  9.5  10.6    Eosinophil Rel %      0.3 - 6.2 % 0.1 (L)  0.1 (L)  0.0 (L)  0.5  2.7    Basophil Rel %      0.0 - 1.5 % 0.2  0.3  0.2  0.5  0.4    Immature Granulocyte Rel %      0.0 - 0.5 % 0.5  0.4  0.6 (H)  0.4  0.2    Neutrophils Absolute      1.70 - 7.00 10*3/mm3 11.08 (H)  9.63 (H)  7.57 (H)  7.34 (H)  2.65    Lymphocytes Absolute      0.70 - 3.10 10*3/mm3 1.01  1.29  1.37  1.12  1.18    Monocytes Absolute      0.10 - 0.90 10*3/mm3 0.84  0.81  0.76  0.90  0.47    Eosinophils Absolute      0.00 - 0.40 10*3/mm3 0.01  0.01  0.00  0.05  0.12    Basophils Absolute      0.00 - 0.20 10*3/mm3 0.03  0.03  0.02  0.05  0.02    Immature Grans, Absolute      0.00 - 0.05 10*3/mm3 0.06 (H)  0.05  0.06 (H)  0.04  0.01    nRBC      0.0 - 0.2 /100 WBC 0.0  0.0  0.0  0.0  0.0        Assessment / Plan      Assessment/Plan:     1.  Pulmonary embolism, provoked in the context of oral contraceptives  2.  Family history of recurrent VTE in her father  -She presented with a provoked VTE event in the context of oral contraceptive use.  Workup for primary causes of thrombophilia included negative factor V Leiden mutation, normal levels of protein C and protein S.  Prothrombin gene  mutation was negative.  Antithrombin levels were normal.  Anticardiolipin antibodies and beta-2 glycoprotein antibodies are negative.    -Despite no evidence of thrombophilia, she does have persistent polycythemia which certainly increases her risk for further thrombosis.  -Because of her family history she has been continued on indefinite anticoagulation prior to consultation.  Despite no evidence of thrombophilia and unprovoked cause, I would recommend continuation of anticoagulation at least until etiology of her polycythemia can be determined.  -Tolerating Xarelto well.  Check CBC and CMP with next lab draw    3.  Polycythemia  -This is a persistent finding.  Reviewed negative JAK2, VANESSA-R, MPL, and exon 12 mutations.  Erythropoietin is normal at 10.7.  -BCR-ABL was ordered but not collected but this is unlikely to be of high yield in context of negative bone marrow biopsy.  -She has no history of cardiopulmonary disease.  -Reviewed her bone marrow biopsy which is normal and does not explain her persistent polycythemia.  -Discussed workup for genetic causes of polycythemia, particularly in the context of family history of unexplained Polycythemia in her father as well.  -Will arrange P50 testing as well as testing for inherited causes of polycythemia.  She will be contacted with instructions for how to have this drawn.  -She does have a family history of polycythemia, but no specific cause was identified in her father.  This does raise the possibility of an inherited autosomal dominant mutation due to polycythemia, particularly in the face of inappropriately normal erythropoietin.  This is confounded by the fact that [place] and was told this finding may be secondary to this.    4. Abnormal LFTs  -Liver US was normal in 2022. Labs show normal iron saturation/ferritin/no evidence for iron overload.  -CMP with next lab draw    Follow Up:   8 weeks     Reshma Baig MD  Hematology and Oncology

## 2024-01-22 NOTE — PATIENT INSTRUCTIONS
Call in 2 weeks if you have not heard from us regarding additional testing in [place] and scheduling follow up

## 2024-01-23 ENCOUNTER — TELEPHONE (OUTPATIENT)
Dept: ONCOLOGY | Facility: CLINIC | Age: 34
End: 2024-01-23
Payer: COMMERCIAL

## 2024-01-23 NOTE — TELEPHONE ENCOUNTER
Notified patient that order for P50 has been sent to Main lab in [place] and she can complete it there.  Patient verbalized understanding.

## 2024-01-29 ENCOUNTER — LAB (OUTPATIENT)
Dept: LAB | Facility: HOSPITAL | Age: 34
End: 2024-01-29
Payer: COMMERCIAL

## 2024-01-29 DIAGNOSIS — D75.1 POLYCYTHEMIA: ICD-10-CM

## 2024-01-29 LAB
ALBUMIN SERPL-MCNC: 4.4 G/DL (ref 3.5–5.2)
ALBUMIN/GLOB SERPL: 1.8 G/DL
ALP SERPL-CCNC: 48 U/L (ref 39–117)
ALT SERPL W P-5'-P-CCNC: 20 U/L (ref 1–33)
ANION GAP SERPL CALCULATED.3IONS-SCNC: 11 MMOL/L (ref 5–15)
AST SERPL-CCNC: 48 U/L (ref 1–32)
BASOPHILS # BLD AUTO: 0.03 10*3/MM3 (ref 0–0.2)
BASOPHILS NFR BLD AUTO: 0.4 % (ref 0–1.5)
BILIRUB SERPL-MCNC: 0.8 MG/DL (ref 0–1.2)
BUN SERPL-MCNC: 17 MG/DL (ref 6–20)
BUN/CREAT SERPL: 20.2 (ref 7–25)
CALCIUM SPEC-SCNC: 9.7 MG/DL (ref 8.6–10.5)
CHLORIDE SERPL-SCNC: 103 MMOL/L (ref 98–107)
CO2 SERPL-SCNC: 22 MMOL/L (ref 22–29)
CREAT SERPL-MCNC: 0.84 MG/DL (ref 0.57–1)
DEPRECATED RDW RBC AUTO: 40.5 FL (ref 37–54)
EGFRCR SERPLBLD CKD-EPI 2021: 94.8 ML/MIN/1.73
EOSINOPHIL # BLD AUTO: 0.04 10*3/MM3 (ref 0–0.4)
EOSINOPHIL NFR BLD AUTO: 0.5 % (ref 0.3–6.2)
ERYTHROCYTE [DISTWIDTH] IN BLOOD BY AUTOMATED COUNT: 12 % (ref 12.3–15.4)
GLOBULIN UR ELPH-MCNC: 2.5 GM/DL
GLUCOSE SERPL-MCNC: 90 MG/DL (ref 65–99)
HCT VFR BLD AUTO: 49.7 % (ref 34–46.6)
HGB BLD-MCNC: 16.3 G/DL (ref 12–15.9)
IMM GRANULOCYTES # BLD AUTO: 0.05 10*3/MM3 (ref 0–0.05)
IMM GRANULOCYTES NFR BLD AUTO: 0.7 % (ref 0–0.5)
LYMPHOCYTES # BLD AUTO: 1.27 10*3/MM3 (ref 0.7–3.1)
LYMPHOCYTES NFR BLD AUTO: 17.3 % (ref 19.6–45.3)
MCH RBC QN AUTO: 30.2 PG (ref 26.6–33)
MCHC RBC AUTO-ENTMCNC: 32.8 G/DL (ref 31.5–35.7)
MCV RBC AUTO: 92.2 FL (ref 79–97)
MONOCYTES # BLD AUTO: 0.58 10*3/MM3 (ref 0.1–0.9)
MONOCYTES NFR BLD AUTO: 7.9 % (ref 5–12)
NEUTROPHILS NFR BLD AUTO: 5.39 10*3/MM3 (ref 1.7–7)
NEUTROPHILS NFR BLD AUTO: 73.2 % (ref 42.7–76)
NRBC BLD AUTO-RTO: 0 /100 WBC (ref 0–0.2)
PLATELET # BLD AUTO: 260 10*3/MM3 (ref 140–450)
PMV BLD AUTO: 9.6 FL (ref 6–12)
POTASSIUM SERPL-SCNC: 4.4 MMOL/L (ref 3.5–5.2)
PROT SERPL-MCNC: 6.9 G/DL (ref 6–8.5)
RBC # BLD AUTO: 5.39 10*6/MM3 (ref 3.77–5.28)
SODIUM SERPL-SCNC: 136 MMOL/L (ref 136–145)
WBC NRBC COR # BLD AUTO: 7.36 10*3/MM3 (ref 3.4–10.8)

## 2024-01-29 PROCEDURE — 85025 COMPLETE CBC W/AUTO DIFF WBC: CPT

## 2024-01-29 PROCEDURE — 36415 COLL VENOUS BLD VENIPUNCTURE: CPT

## 2024-01-29 PROCEDURE — 80053 COMPREHEN METABOLIC PANEL: CPT

## 2024-01-29 PROCEDURE — 82820 HEMOGLOBIN-OXYGEN AFFINITY: CPT

## 2024-01-29 RX ORDER — RIVAROXABAN 20 MG/1
TABLET, FILM COATED ORAL
Qty: 90 TABLET | Refills: 3 | Status: SHIPPED | OUTPATIENT
Start: 2024-01-29

## 2024-01-29 RX ORDER — SPIRONOLACTONE 100 MG/1
150 TABLET, FILM COATED ORAL DAILY
Qty: 45 TABLET | Refills: 2 | Status: SHIPPED | OUTPATIENT
Start: 2024-01-29

## 2024-01-31 LAB — REF LAB TEST RESULTS: NORMAL

## 2024-02-08 LAB — REF LAB TEST RESULTS: NORMAL

## 2024-02-14 ENCOUNTER — PATIENT MESSAGE (OUTPATIENT)
Dept: INTERNAL MEDICINE | Facility: CLINIC | Age: 34
End: 2024-02-14
Payer: COMMERCIAL

## 2024-02-14 DIAGNOSIS — L70.9 ACNE, UNSPECIFIED ACNE TYPE: ICD-10-CM

## 2024-02-16 DIAGNOSIS — L70.9 ACNE, UNSPECIFIED ACNE TYPE: ICD-10-CM

## 2024-02-16 RX ORDER — TRETINOIN 0.25 MG/G
GEL TOPICAL
Qty: 20 G | Refills: 0 | Status: SHIPPED | OUTPATIENT
Start: 2024-02-16

## 2024-02-16 RX ORDER — TRETINOIN 0.25 MG/G
GEL TOPICAL
Qty: 20 G | Refills: 0 | Status: SHIPPED | OUTPATIENT
Start: 2024-02-16 | End: 2024-02-16 | Stop reason: SDUPTHER

## 2024-03-18 ENCOUNTER — OFFICE VISIT (OUTPATIENT)
Dept: ONCOLOGY | Facility: CLINIC | Age: 34
End: 2024-03-18
Payer: COMMERCIAL

## 2024-03-18 VITALS
WEIGHT: 189 LBS | HEART RATE: 87 BPM | OXYGEN SATURATION: 98 % | DIASTOLIC BLOOD PRESSURE: 63 MMHG | TEMPERATURE: 97.5 F | SYSTOLIC BLOOD PRESSURE: 123 MMHG | RESPIRATION RATE: 16 BRPM | HEIGHT: 67 IN | BODY MASS INDEX: 29.66 KG/M2

## 2024-03-18 DIAGNOSIS — R51.9 CHRONIC NONINTRACTABLE HEADACHE, UNSPECIFIED HEADACHE TYPE: ICD-10-CM

## 2024-03-18 DIAGNOSIS — G89.29 CHRONIC NONINTRACTABLE HEADACHE, UNSPECIFIED HEADACHE TYPE: ICD-10-CM

## 2024-03-18 DIAGNOSIS — I26.99 PULMONARY EMBOLISM WITHOUT ACUTE COR PULMONALE, UNSPECIFIED CHRONICITY, UNSPECIFIED PULMONARY EMBOLISM TYPE: Primary | ICD-10-CM

## 2024-03-18 RX ORDER — DAPSONE 50 MG/G
GEL TOPICAL 2 TIMES DAILY
COMMUNITY

## 2024-03-18 RX ORDER — CLINDAMYCIN PHOSPHATE 10 MG/G
1 GEL TOPICAL 2 TIMES DAILY
COMMUNITY

## 2024-03-18 NOTE — PROGRESS NOTES
Hematology and Oncology [place]  Office number [phone]    Fax number [phone]    Follow up     Date: 24    Patient Name: Christine Black  MRN: [id number]  : 1990    Primary care: Erika Curiel NP    Chief Complaint: VTE, polycythemia    History of Present Illness: Christine Black is a pleasant 34 y.o. female who presents today for evaluation of VTE.    In 2020 she presented with buttock pain and shoulder pain and was diagnosed with a DVT and PE.  She underwent thrombectomy with stent placement.  She had been recently initiated on OCPs in January of that year.  She had no preceding history of high risk travel or surgery.  She was initiated on Xarelto which she continues with good tolerance.  Her menses were heavy with a ParaGard, but are light since she had a Mirena placed in 2023.    She had a limited hypercoagulable panel obtained at the time of her presentationThis was notable for Antithrombin 3 levels which were normal; normal homocystine levels.  Protein C&S levels were normal.  Factor V Leiden gene mutation was negative.    Recently she was noted to have polycythemia with a hemoglobin of 18.  Notably, she had only had coffee on the morning of her blood draw.  Review of serial CBCs dating back to  show high normal hemoglobin on prior checks.    Notably she  has a family history of a provoked blood clot in her father at the age of 30 following a hip fracture with repair.  He also experienced a blood clot in his 60s with travel.  She also has a family history of early onset breast cancer in her paternal aunt at age 49.  No prior cancer genetic testing that she is aware of.    She provides additional family history that her dad was told he was polycythemic but this was ultimately determined to be due to living in [place].  He has not required phlebotomy.  Her grandmother, mother, and sister all have elevated liver function tests.  She is not aware of any specific  diagnosis.    Bone marrow biopsy from 12/20/2023 shows normocellular bone marrow for age with adequate trilineage hematopoiesis and no evidence of dysplasia, fibrosis, increased blasts, or and lymphoma.  Cytogenetics negative.    Interval history:  Dermatologist recently switched minocycline to Keflex daily. Controlling acne well. No other recent medication changes.    No recent infections or steroids. Continues Xarelto, no abnormal bleeding. Has stents   No CP, SOA, Cough, Headaches, N/V, abdominal pain  She has h/o mechanical thrombectomy with subsequent angioplasty and then stenting of the right iliac veins as well as the common femoral vein 3/11/2020. She questions whether her stents should remain longterm. She reports her Vascular surgeon, Dr. Yan, is no longer practicing and she has not had follow up for this issue.   Severe headaches 2x per month associated with photophobia and speech disturbance once per month. Present since age 19 and not changing in character but are increasing in frequency. Has never seen neurology    Past Medical History:   Past Medical History:   Diagnosis Date    Clotting disorder 3/7/2020    Blood clot in leg and lung. Removal of blood clot in leg    Deep vein thrombosis 3/7/2020    Family history of blood clots     History of blood clots     Ovarian cyst     Pulmonary embolism 03/07/2020    Blood clots in right leg and lungs       Past Surgical History:   Past Surgical History:   Procedure Laterality Date    CARDIAC CATHETERIZATION Right 3/9/2020    Procedure: Peripheral angiography;  Surgeon: Margarito Yan MD;  Location: [facility] CATH INVASIVE LOCATION;  Service: Cardiovascular;  Laterality: Right;  Prone/ R. Popliteal Access    OTHER SURGICAL HISTORY      Blood clot removal- Right Leg     Has right iliac/femoral stent 2020    Family History:   Family History   Problem Relation Age of Onset    Breast cancer Paternal Aunt     Breast cancer Paternal Aunt   "      Social History:   Social History     Socioeconomic History    Marital status: Single   Tobacco Use    Smoking status: Never    Smokeless tobacco: Never   Vaping Use    Vaping status: Never Used   Substance and Sexual Activity    Alcohol use: Yes     Alcohol/week: 1.0 standard drink of alcohol     Types: 1 Glasses of wine per week    Drug use: Never    Sexual activity: Yes     Partners: Male     Birth control/protection: I.U.D.       Medications:     Current Outpatient Medications:     cephalexin (KEFLEX) 500 MG capsule, Take 1 capsule by mouth Daily., Disp: , Rfl:     clindamycin 1 % gel, Apply 1 Application topically to the appropriate area as directed 2 (Two) Times a Day., Disp: , Rfl:     Dapsone 5 % topical gel, Apply  topically to the appropriate area as directed 2 (Two) Times a Day., Disp: , Rfl:     Levonorgestrel (MIRENA, 52 MG, IU), by Intrauterine route., Disp: , Rfl:     spironolactone (ALDACTONE) 100 MG tablet, TAKE 1 AND 1/2 TABLET BY MOUTH DAILY, Disp: 45 tablet, Rfl: 2    tretinoin (RETIN-A) 0.025 % gel, Apply topically to the appropriate area as directed every other day., Disp: 20 g, Rfl: 0    Xarelto 20 MG tablet, TAKE ONE TABLET BY MOUTH DAILY, Disp: 90 tablet, Rfl: 3    Allergies:   No Known Allergies    Objective     Vital Signs:   Vitals:    03/18/24 0904   BP: 123/63   Pulse: 87   Resp: 16   Temp: 97.5 °F (36.4 °C)   SpO2: 98%   Weight: 85.7 kg (189 lb)   Height: 170.2 cm (67\")   PainSc: 0-No pain    Body mass index is 29.6 kg/m².   Pain Score    03/18/24 0904   PainSc: 0-No pain       Physical Exam:   General: No acute distress. Well appearing   HEENT: Normocephalic, atraumatic. Sclera anicteric.   Neck: supple, no adenopathy.   Cardiovascular: regular rate and rhythm. No murmurs.   Respiratory: Normal rate. Clear to auscultation bilaterally.  Abdomen: Soft, nontender, non distended with normoactive bowel sounds.   Lymph: no cervical, supraclavicular or axillary adenopathy.  Neuro: Alert " and oriented x 3. No focal deficits.   Ext: Symmetric, no swelling.     Laboratory/Imaging Reviewed:   No visits with results within 2 Week(s) from this visit.   Latest known visit with results is:   Lab on 01/29/2024   Component Date Value Ref Range Status    Miscellaneous Lab Test Result 01/29/2024 See attached report   Final    Miscellaneous Lab Test Result 01/29/2024 See attached report   Final    Glucose 01/29/2024 90  65 - 99 mg/dL Final    BUN 01/29/2024 17  6 - 20 mg/dL Final    Creatinine 01/29/2024 0.84  0.57 - 1.00 mg/dL Final    Sodium 01/29/2024 136  136 - 145 mmol/L Final    Potassium 01/29/2024 4.4  3.5 - 5.2 mmol/L Final    Chloride 01/29/2024 103  98 - 107 mmol/L Final    CO2 01/29/2024 22.0  22.0 - 29.0 mmol/L Final    Calcium 01/29/2024 9.7  8.6 - 10.5 mg/dL Final    Total Protein 01/29/2024 6.9  6.0 - 8.5 g/dL Final    Albumin 01/29/2024 4.4  3.5 - 5.2 g/dL Final    ALT (SGPT) 01/29/2024 20  1 - 33 U/L Final    AST (SGOT) 01/29/2024 48 (H)  1 - 32 U/L Final    Alkaline Phosphatase 01/29/2024 48  39 - 117 U/L Final    Total Bilirubin 01/29/2024 0.8  0.0 - 1.2 mg/dL Final    Globulin 01/29/2024 2.5  gm/dL Final    A/G Ratio 01/29/2024 1.8  g/dL Final    BUN/Creatinine Ratio 01/29/2024 20.2  7.0 - 25.0 Final    Anion Gap 01/29/2024 11.0  5.0 - 15.0 mmol/L Final    eGFR 01/29/2024 94.8  >60.0 mL/min/1.73 Final    WBC 01/29/2024 7.36  3.40 - 10.80 10*3/mm3 Final    RBC 01/29/2024 5.39 (H)  3.77 - 5.28 10*6/mm3 Final    Hemoglobin 01/29/2024 16.3 (H)  12.0 - 15.9 g/dL Final    Hematocrit 01/29/2024 49.7 (H)  34.0 - 46.6 % Final    MCV 01/29/2024 92.2  79.0 - 97.0 fL Final    MCH 01/29/2024 30.2  26.6 - 33.0 pg Final    MCHC 01/29/2024 32.8  31.5 - 35.7 g/dL Final    RDW 01/29/2024 12.0 (L)  12.3 - 15.4 % Final    RDW-SD 01/29/2024 40.5  37.0 - 54.0 fl Final    MPV 01/29/2024 9.6  6.0 - 12.0 fL Final    Platelets 01/29/2024 260  140 - 450 10*3/mm3 Final    Neutrophil % 01/29/2024 73.2  42.7 - 76.0 %  Final    Lymphocyte % 01/29/2024 17.3 (L)  19.6 - 45.3 % Final    Monocyte % 01/29/2024 7.9  5.0 - 12.0 % Final    Eosinophil % 01/29/2024 0.5  0.3 - 6.2 % Final    Basophil % 01/29/2024 0.4  0.0 - 1.5 % Final    Immature Grans % 01/29/2024 0.7 (H)  0.0 - 0.5 % Final    Neutrophils, Absolute 01/29/2024 5.39  1.70 - 7.00 10*3/mm3 Final    Lymphocytes, Absolute 01/29/2024 1.27  0.70 - 3.10 10*3/mm3 Final    Monocytes, Absolute 01/29/2024 0.58  0.10 - 0.90 10*3/mm3 Final    Eosinophils, Absolute 01/29/2024 0.04  0.00 - 0.40 10*3/mm3 Final    Basophils, Absolute 01/29/2024 0.03  0.00 - 0.20 10*3/mm3 Final    Immature Grans, Absolute 01/29/2024 0.05  0.00 - 0.05 10*3/mm3 Final    nRBC 01/29/2024 0.0  0.0 - 0.2 /100 WBC Final     Protein C-Functional      73 - 180 % 120    Protein S Functional      63 - 140 % 111    TSH Baseline      0.270 - 4.200 uIU/mL 4.070    Factor V Leiden      Normal  Normal    Beta-2 Microglobulin      0.8 - 2.2 mg/L 1.7    Homocysteine, Plasma      0.0 - 15.0 umol/L 4.0    AntiThromb III Func      75 - 135 % 130     No results found.  Component      Latest Ref Rng 10/6/2023   WBC      3.40 - 10.80 10*3/mm3 5.86    RBC      3.77 - 5.28 10*6/mm3 5.79 (H)    Hemoglobin      12.0 - 15.9 g/dL 18.3 (H)    Hematocrit      34.0 - 46.6 % 54.0 (H)    MCV      79.0 - 97.0 fL 93.3    MCH      26.6 - 33.0 pg 31.6    MCHC      31.5 - 35.7 g/dL 33.9    RDW      12.3 - 15.4 % 13.2    Platelets      140 - 450 10*3/mm3 266    Neutrophil Rel %      42.7 - 76.0 % 63.8    Lymphocyte Rel %      19.6 - 45.3 % 26.6    Monocyte Rel %      5.0 - 12.0 % 7.5    Eosinophil Rel %      0.3 - 6.2 % 1.0    Basophil Rel %      0.0 - 1.5 % 0.9    Neutrophils Absolute      1.70 - 7.00 10*3/mm3 3.74    Lymphocytes Absolute      0.70 - 3.10 10*3/mm3 1.56    Monocytes Absolute      0.10 - 0.90 10*3/mm3 0.44    Eosinophils Absolute      0.00 - 0.40 10*3/mm3 0.06    Basophils Absolute      0.00 - 0.20 10*3/mm3 0.05    Immature  Granulocyte Rel %      0.0 - 0.5 % 0.2    Immature Grans, Absolute      0.00 - 0.05 10*3/mm3 0.01    nRBC      0.0 - 0.2 /100 WBC 0.0    Glucose      65 - 99 mg/dL 81    BUN      6 - 20 mg/dL 15    Creatinine      0.57 - 1.00 mg/dL 0.94    EGFR Result      >60.0 mL/min/1.73 83.4    BUN/Creatinine Ratio      7.0 - 25.0  16.0    Sodium      136 - 145 mmol/L 140    Potassium      3.5 - 5.2 mmol/L 4.2    Chloride      98 - 107 mmol/L 102    CO2      22.0 - 29.0 mmol/L 25.4    Calcium      8.6 - 10.5 mg/dL 10.4    Total Protein      6.0 - 8.5 g/dL 7.5    Albumin      3.5 - 5.2 g/dL 5.1    Globulin      gm/dL 2.4    A/G Ratio      g/dL 2.1    Total Bilirubin      0.0 - 1.2 mg/dL 1.7 (H)    Alkaline Phosphatase      39 - 117 U/L 63    AST (SGOT)      1 - 32 U/L 75 (H)    ALT (SGPT)      1 - 33 U/L 20    Vitamin B-12      211 - 946 pg/mL 1,140 (H)    Folate      4.78 - 24.20 ng/mL 17.60    Iron      37 - 145 mcg/dL 168 (H)    Ferritin      13.00 - 150.00 ng/mL 28.60       Component      Latest Ref Rng 3/6/2020 3/9/2020 3/10/2020 7/19/2020 1/10/2022   WBC      3.40 - 10.80 10*3/mm3 13.03 (H)  11.82 (H)  9.78  9.50  4.45    RBC      3.77 - 5.28 10*6/mm3 4.73  4.79  4.38  4.68  5.19    Hemoglobin      12.0 - 15.9 g/dL 15.0  15.3  13.8  14.7  14.4    Hematocrit      34.0 - 46.6 % 45.2  45.5  41.0  43.6  44.3    MCV      79.0 - 97.0 fL 95.6  95.0  93.6  93.2  85.4    MCH      26.6 - 33.0 pg 31.7  31.9  31.5  31.4  27.7    MCHC      31.5 - 35.7 g/dL 33.2  33.6  33.7  33.7  32.5    RDW      12.3 - 15.4 % 12.8  12.6  12.4  13.4  13.8    RDW-SD      37.0 - 54.0 fl 45.7  44.7  43.3  45.6     MPV      6.0 - 12.0 fL 8.4  8.7  8.7  9.3     Platelets      140 - 450 10*3/mm3 278  327  317  205  260    Neutrophil Rel %      42.7 - 76.0 % 85.0 (H)  81.4 (H)  77.4 (H)  77.3 (H)  59.6    Lymphocyte Rel %      19.6 - 45.3 % 7.8 (L)  10.9 (L)  14.0 (L)  11.8 (L)  26.5    Monocyte Rel %      5.0 - 12.0 % 6.4  6.9  7.8  9.5  10.6    Eosinophil  Rel %      0.3 - 6.2 % 0.1 (L)  0.1 (L)  0.0 (L)  0.5  2.7    Basophil Rel %      0.0 - 1.5 % 0.2  0.3  0.2  0.5  0.4    Immature Granulocyte Rel %      0.0 - 0.5 % 0.5  0.4  0.6 (H)  0.4  0.2    Neutrophils Absolute      1.70 - 7.00 10*3/mm3 11.08 (H)  9.63 (H)  7.57 (H)  7.34 (H)  2.65    Lymphocytes Absolute      0.70 - 3.10 10*3/mm3 1.01  1.29  1.37  1.12  1.18    Monocytes Absolute      0.10 - 0.90 10*3/mm3 0.84  0.81  0.76  0.90  0.47    Eosinophils Absolute      0.00 - 0.40 10*3/mm3 0.01  0.01  0.00  0.05  0.12    Basophils Absolute      0.00 - 0.20 10*3/mm3 0.03  0.03  0.02  0.05  0.02    Immature Grans, Absolute      0.00 - 0.05 10*3/mm3 0.06 (H)  0.05  0.06 (H)  0.04  0.01    nRBC      0.0 - 0.2 /100 WBC 0.0  0.0  0.0  0.0  0.0                  Assessment / Plan      Assessment/Plan:     1.  Pulmonary embolism, provoked in the context of oral contraceptives  2.  Family history of recurrent VTE in her father  -She presented with a provoked VTE event in the context of oral contraceptive use.  Workup for primary causes of thrombophilia included negative factor V Leiden mutation, normal levels of protein C and protein S.  Prothrombin gene mutation was negative.  Antithrombin levels were normal.  Anticardiolipin antibodies and beta-2 glycoprotein antibodies are negative.  Despite no evidence of thrombophilia, she does have persistent polycythemia which may increase her risk for further thrombosis.  -Because of her family history she has been continued on indefinite anticoagulation prior to consultation.  Despite no evidence of thrombophilia and unprovoked cause, I recommended continuation of anticoagulation at least until etiology of her polycythemia can be determined. She has now undergone extensive workup as below and appears to have a high affinity hemoglobin as the cause of her polycythemia.  We discussed that management of high affinity hemoglobin as it relates to primary prevention of thrombosis is not  clear but given that she has a history of VTE associated with polycythemia, I would err on the side of continuing indefinite anticoagulation.  She is in agreement with this plan.  -Continue Xarelto daily.  -Recent labs reviewed and stable.    3.  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    -Negative JAK2, VANESSA-R, MPL, and exon 12 mutations.    -Erythropoietin is normal at 10.7.  -BCR-ABL was ordered but not collected but this is unlikely to be of high yield in context of negative bone marrow biopsy.  -She has no history of cardiopulmonary disease.  -Bone marrow biopsy is normal and does not explain her persistent polycythemia.  -P50 testing performed at [facility] January 2024 notable for a low P50 of 17 (LLN 22) which is consistent with increased oxygen affinity of hemoglobin.   -We reviewed her [facility] laboratories testing which is negative for VHL related erythrocytosis and related conditions.  We discussed referral to genetic counseling or an active center.  However there is an autosomal dominant pattern, and reduced P50 are highly suggestive of high affinity hemoglobin and I would recommend managing her as such.  -Will also discuss with genetics.    4. Abnormal LFTs  -Liver US was normal in 2022. Labs show normal iron saturation/ferritin/no evidence for iron overload.  -CMP shows improvement in AST which still remains slightly above the normal range in January 2024    5.  Persistent headaches  -Will obtain a brain MRI and refer to neurology.  Suspect migrainous due to intermittent nature and associated aura symptoms.    6.  Presence of vascular stents  -She will remain on chronic anticoagulation.  She has questions regarding need for stent retrieval.  I will reach out to vascular surgery and refer her if further follow-up or intervention needed.    Follow Up:   12 weeks     Reshma Baig MD  Hematology and Oncology

## 2024-04-24 ENCOUNTER — TELEPHONE (OUTPATIENT)
Dept: ONCOLOGY | Facility: CLINIC | Age: 34
End: 2024-04-24
Payer: COMMERCIAL

## 2024-04-24 ENCOUNTER — HOSPITAL ENCOUNTER (OUTPATIENT)
Dept: MRI IMAGING | Facility: HOSPITAL | Age: 34
Discharge: HOME | End: 2024-04-24
Admitting: INTERNAL MEDICINE
Payer: COMMERCIAL

## 2024-04-24 DIAGNOSIS — I26.99 PULMONARY EMBOLISM WITHOUT ACUTE COR PULMONALE, UNSPECIFIED CHRONICITY, UNSPECIFIED PULMONARY EMBOLISM TYPE: ICD-10-CM

## 2024-04-24 DIAGNOSIS — G89.29 CHRONIC NONINTRACTABLE HEADACHE, UNSPECIFIED HEADACHE TYPE: ICD-10-CM

## 2024-04-24 DIAGNOSIS — R51.9 CHRONIC NONINTRACTABLE HEADACHE, UNSPECIFIED HEADACHE TYPE: ICD-10-CM

## 2024-04-24 PROCEDURE — 0 GADOBENATE DIMEGLUMINE 529 MG/ML SOLUTION: Performed by: INTERNAL MEDICINE

## 2024-04-24 PROCEDURE — 70553 MRI BRAIN STEM W/O & W/DYE: CPT

## 2024-04-24 PROCEDURE — A9577 INJ MULTIHANCE: HCPCS | Performed by: INTERNAL MEDICINE

## 2024-04-24 RX ADMIN — GADOBENATE DIMEGLUMINE 15 ML: 529 INJECTION, SOLUTION INTRAVENOUS at 08:51

## 2024-04-24 NOTE — TELEPHONE ENCOUNTER
Notified patient per Dr. Baig regarding MRI Brain performed today: Please notify patient of normal result/ no clinically significant results. Patient verbalized understanding.

## 2024-04-24 NOTE — TELEPHONE ENCOUNTER
Called patient to notify her per Dr. Baig regarding MRI Brain performed today: Please notify patient of normal result/ no clinically significant results. No answer received.  Left  requesting return call.

## 2024-04-29 ENCOUNTER — OFFICE VISIT (OUTPATIENT)
Dept: NEUROLOGY | Facility: CLINIC | Age: 34
End: 2024-04-29
Payer: COMMERCIAL

## 2024-04-29 VITALS
HEIGHT: 67 IN | WEIGHT: 183 LBS | DIASTOLIC BLOOD PRESSURE: 64 MMHG | BODY MASS INDEX: 28.72 KG/M2 | SYSTOLIC BLOOD PRESSURE: 108 MMHG | HEART RATE: 81 BPM | TEMPERATURE: 98.7 F | OXYGEN SATURATION: 100 %

## 2024-04-29 DIAGNOSIS — G43.109 MIGRAINE WITH AURA AND WITHOUT STATUS MIGRAINOSUS, NOT INTRACTABLE: Primary | ICD-10-CM

## 2024-04-29 PROCEDURE — 99213 OFFICE O/P EST LOW 20 MIN: CPT | Performed by: NURSE PRACTITIONER

## 2024-04-29 RX ORDER — TRIFAROTENE 50 UG/G
CREAM TOPICAL
COMMUNITY

## 2024-04-29 RX ORDER — RIMEGEPANT SULFATE 75 MG/75MG
75 TABLET, ORALLY DISINTEGRATING ORAL EVERY OTHER DAY
Qty: 8 TABLET | Refills: 3 | Status: SHIPPED | OUTPATIENT
Start: 2024-04-29

## 2024-05-01 RX ORDER — SPIRONOLACTONE 100 MG/1
150 TABLET, FILM COATED ORAL DAILY
Qty: 135 TABLET | Refills: 3 | Status: SHIPPED | OUTPATIENT
Start: 2024-05-01

## 2024-05-08 ENCOUNTER — PATIENT ROUNDING (BHMG ONLY) (OUTPATIENT)
Dept: NEUROLOGY | Facility: CLINIC | Age: 34
End: 2024-05-08
Payer: COMMERCIAL

## 2024-06-21 ENCOUNTER — TELEPHONE (OUTPATIENT)
Dept: ONCOLOGY | Facility: CLINIC | Age: 34
End: 2024-06-21

## 2024-06-21 NOTE — TELEPHONE ENCOUNTER
Caller: Christine Black    Relationship to patient: Self    Best call back number: [phone]    Chief complaint: CANCEL - R/S AS PATIENT WAS JUST SWITCHED TO [facility] MUNOZ - SAW ON MYCHART - AND WOULD RATHER SEE DR. REBOLLAR.    Type of visit: F/U 1    Requested date: MONDAYS     If rescheduling, when is the original appointment: 6/24/24

## 2024-06-24 NOTE — TELEPHONE ENCOUNTER
Hub staff attempted to follow warm transfer process and was unsuccessful     Caller: Christine Black    Relationship to patient: Self    Best call back number: [phone]    Patient is needing: PATIENT CALLING TO R/S HER APPT FOR THIS MORNING - SHE WANTS TO DR REBOLLAR.     PLEASE CALL TO R/S.

## 2024-06-28 NOTE — TELEPHONE ENCOUNTER
IF YOU CANNOT REACH ME TO CONNECT PT. PLEASE LET HER KNOW THAT HER APPT IS R/S TO 07/15/24 AT 03:45 PM WITH DR. REBOLLAR AT THE [facility].

## 2024-06-28 NOTE — TELEPHONE ENCOUNTER
Hub staff attempted to follow warm transfer process and was unsuccessful     Caller: Christine Black    Relationship to patient: Self    Best call back number: [phone]    Patient is needing: CALL TO R/S 6/24/2024 APPT WANTS TO SEE DR. REBOLLAR IN [place].

## 2024-07-01 ENCOUNTER — OFFICE VISIT (OUTPATIENT)
Dept: NEUROLOGY | Facility: CLINIC | Age: 34
End: 2024-07-01
Payer: COMMERCIAL

## 2024-07-01 VITALS
WEIGHT: 177 LBS | BODY MASS INDEX: 27.78 KG/M2 | DIASTOLIC BLOOD PRESSURE: 68 MMHG | HEART RATE: 68 BPM | SYSTOLIC BLOOD PRESSURE: 110 MMHG | OXYGEN SATURATION: 100 % | HEIGHT: 67 IN | TEMPERATURE: 98.7 F

## 2024-07-01 DIAGNOSIS — G43.109 MIGRAINE WITH AURA AND WITHOUT STATUS MIGRAINOSUS, NOT INTRACTABLE: ICD-10-CM

## 2024-07-01 PROCEDURE — 99213 OFFICE O/P EST LOW 20 MIN: CPT | Performed by: NURSE PRACTITIONER

## 2024-07-01 RX ORDER — RIMEGEPANT SULFATE 75 MG/75MG
75 TABLET, ORALLY DISINTEGRATING ORAL EVERY OTHER DAY
Qty: 8 TABLET | Refills: 3 | Status: SHIPPED | OUTPATIENT
Start: 2024-07-01 | End: 2024-07-01

## 2024-07-01 RX ORDER — RIMEGEPANT SULFATE 75 MG/75MG
75 TABLET, ORALLY DISINTEGRATING ORAL DAILY PRN
Qty: 8 TABLET | Refills: 3 | Status: SHIPPED | OUTPATIENT
Start: 2024-07-01

## 2024-07-01 NOTE — PROGRESS NOTES
New Patient Office Visit      Patient Name: Christine Black  : 1990   MRN: [id number]     Referring Physician: No ref. provider found    Chief Complaint:    Chief Complaint   Patient presents with    Follow-up     Migraine; patient reports  migraine days        History of Present Illness: Christine Black is a 34 y.o. female who is here today to follow-up with neurology on migraine headaches.  At last office visit she was started on Nurtec as needed.  This is working well for her.  It will abort her migraine within 20 to 30 minutes.  She denies any changes in her migraines. She still has an aura that causes visual disturbances and blurred vision.  They are still located above her left eye with associated photophobia, phonophobia and nausea.  She describes her pain as a pulsating pain      She still is over due for her eye exam and will call to get this scheduled.         Pertinent Medical History: DVT, PE , followed by Hematology for elevated H&H she is on blood thinners  - She has an IUD in place for birth control.    Subjective      Review of Systems:   Review of Systems   Eyes:  Positive for photophobia and visual disturbance.   Neurological:  Positive for headache.       Past Medical History:   Past Medical History:   Diagnosis Date    Clotting disorder 3/7/2020    Blood clot in leg and lung. Removal of blood clot in leg    Cluster headache     Deep vein thrombosis 3/7/2020    Family history of blood clots     Headache, tension-type     History of blood clots     Migraine     Ovarian cyst     Pulmonary embolism 2020    Blood clots in right leg and lungs    Vision loss     When migraines hit       Past Surgical History:   Past Surgical History:   Procedure Laterality Date    CARDIAC CATHETERIZATION Right 2020    Procedure: Peripheral angiography;  Surgeon: Margarito Yan MD;  Location: [facility] INVASIVE LOCATION;  Service: Cardiovascular;  Laterality: Right;  Prone/ R.  "Popliteal Access    OTHER SURGICAL HISTORY      Blood clot removal- Right Leg      VASCULAR SURGERY      Blood clot removal       Family History:   Family History   Problem Relation Age of Onset    Breast cancer Paternal Aunt     Breast cancer Paternal Aunt        Social History:   Social History     Socioeconomic History    Marital status: Single   Tobacco Use    Smoking status: Never    Smokeless tobacco: Never   Vaping Use    Vaping status: Never Used   Substance and Sexual Activity    Alcohol use: Yes     Alcohol/week: 1.0 standard drink of alcohol     Types: 1 Glasses of wine per week    Drug use: Never    Sexual activity: Yes     Partners: Male     Birth control/protection: I.U.D.       Medications:     Current Outpatient Medications:     clindamycin 1 % gel, Apply 1 Application topically to the appropriate area as directed 2 (Two) Times a Day., Disp: , Rfl:     Dapsone 5 % topical gel, Apply  topically to the appropriate area as directed 2 (Two) Times a Day., Disp: , Rfl:     Levonorgestrel (MIRENA, 52 MG, IU), by Intrauterine route., Disp: , Rfl:     Rimegepant Sulfate (Nurtec) 75 MG tablet dispersible tablet, Take 1 tablet by mouth Daily As Needed (migraine)., Disp: 8 tablet, Rfl: 3    spironolactone (ALDACTONE) 100 MG tablet, TAKE 1 AND 1/2 TABLET BY MOUTH DAILY, Disp: 135 tablet, Rfl: 3    Trifarotene (Aklief) 0.005 % cream, Apply  topically., Disp: , Rfl:     Xarelto 20 MG tablet, TAKE ONE TABLET BY MOUTH DAILY, Disp: 90 tablet, Rfl: 3    Allergies:   No Known Allergies    Objective     Physical Exam:  Vital Signs:   Vitals:    07/01/24 0935   BP: 110/68   Pulse: 68   Temp: 98.7 °F (37.1 °C)   SpO2: 100%   Weight: 80.3 kg (177 lb)   Height: 170.2 cm (67\")   PainSc: 0-No pain     Body mass index is 27.72 kg/m².     Physical Exam  Constitutional:       Appearance: Normal appearance.   HENT:      Head: Normocephalic.   Eyes:      Conjunctiva/sclera: Conjunctivae normal.   Pulmonary:      Effort: " Pulmonary effort is normal.   Musculoskeletal:         General: Normal range of motion.   Skin:     General: Skin is warm and dry.   Neurological:      General: No focal deficit present.      Mental Status: She is alert and oriented to person, place, and time.      Cranial Nerves: No dysarthria.      Motor: Motor function is intact.      Coordination: Coordination is intact.      Gait: Gait is intact.   Psychiatric:         Attention and Perception: Attention normal.         Mood and Affect: Mood and affect normal.         Speech: Speech normal.         Behavior: Behavior normal. Behavior is cooperative.         Thought Content: Thought content normal.         Cognition and Memory: Cognition normal.         Judgment: Judgment normal.         Neurologic Exam     Mental Status   Oriented to person, place, and time.   Attention: normal. Concentration: normal.   Speech: speech is normal   Level of consciousness: alert  Knowledge: good.     Motor Exam   Muscle bulk: normal  Overall muscle tone: normal    Sensory Exam   Light touch normal.     Gait, Coordination, and Reflexes     Gait  Gait: normal    Tremor   Resting tremor: absent  Intention tremor: absent  Action tremor: absent      Assessment / Plan      Assessment/Plan:   Diagnoses and all orders for this visit:    1. Migraine with aura and without status migrainosus, not intractable  -     Discontinue: Rimegepant Sulfate (Nurtec) 75 MG tablet dispersible tablet; Take 1 tablet by mouth Every Other Day.  Dispense: 8 tablet; Refill: 3  -     Rimegepant Sulfate (Nurtec) 75 MG tablet dispersible tablet; Take 1 tablet by mouth Daily As Needed (migraine).  Dispense: 8 tablet; Refill: 3           She is doing well with Nurtec.  Reports 1 migraine day in the past 30 days.  She had an MRI of the brain with and without contrast on 4/24/2024 that was unremarkable.    Patient will call in the interim if she has any questions or concerns or changes.    Follow Up:   Return in  about 6 months (around 1/1/2025).    MAVERICK Roberts, FNP-[facility]

## 2024-07-15 ENCOUNTER — OFFICE VISIT (OUTPATIENT)
Dept: ONCOLOGY | Facility: CLINIC | Age: 34
End: 2024-07-15
Payer: COMMERCIAL

## 2024-07-15 VITALS
HEART RATE: 81 BPM | DIASTOLIC BLOOD PRESSURE: 63 MMHG | SYSTOLIC BLOOD PRESSURE: 124 MMHG | OXYGEN SATURATION: 98 % | RESPIRATION RATE: 16 BRPM | HEIGHT: 67 IN | BODY MASS INDEX: 27.67 KG/M2 | WEIGHT: 176.3 LBS | TEMPERATURE: 98.4 F

## 2024-07-15 DIAGNOSIS — D75.1 POLYCYTHEMIA: Primary | ICD-10-CM

## 2024-07-15 PROCEDURE — 99214 OFFICE O/P EST MOD 30 MIN: CPT | Performed by: INTERNAL MEDICINE

## 2024-07-15 NOTE — PROGRESS NOTES
Hematology and Oncology [place]  Office number [phone]    Fax number [phone]    Follow up     Date: 07/15/24    Patient Name: Christine Black  MRN: [id number]  : 1990    Primary care: Erika Curiel NP    Chief Complaint: VTE, polycythemia    History of Present Illness: Christine Black is a pleasant 34 y.o. female who presents today for evaluation of VTE.    In 2020 she presented with buttock pain and shoulder pain and was diagnosed with a DVT and PE.  She underwent thrombectomy with stent placement.  She had been recently initiated on OCPs in January of that year.  She had no preceding history of high risk travel or surgery.  She was initiated on Xarelto which she continues with good tolerance.  Her menses were heavy with a ParaGard, but are light since she had a Mirena placed in 2023.    She had a limited hypercoagulable panel obtained at the time of her presentationThis was notable for Antithrombin 3 levels which were normal; normal homocystine levels.  Protein C&S levels were normal.  Factor V Leiden gene mutation was negative.    Recently she was noted to have polycythemia with a hemoglobin of 18.  Notably, she had only had coffee on the morning of her blood draw.  Review of serial CBCs dating back to  show high normal hemoglobin on prior checks.    Notably she  has a family history of a provoked blood clot in her father at the age of 30 following a hip fracture with repair.  He also experienced a blood clot in his 60s with travel.  She also has a family history of early onset breast cancer in her paternal aunt at age 49.  No prior cancer genetic testing that she is aware of.    She provides additional family history that her dad was told he was polycythemic but this was ultimately determined to be due to living in [place].  He has not required phlebotomy.  Her grandmother, mother, and sister all have elevated liver function tests.  She is not aware of any specific  diagnosis.    Bone marrow biopsy from 12/20/2023 shows normocellular bone marrow for age with adequate trilineage hematopoiesis and no evidence of dysplasia, fibrosis, increased blasts, or and lymphoma.  Cytogenetics negative.    She has h/o mechanical thrombectomy with subsequent angioplasty and then stenting of the right iliac veins as well as the common femoral vein 3/11/2020. Dr. Yan (now retired)    Interval history:  She is here for follow up. Neurology NP started Nurtec but is not helping her migraine. Had particularly severe migraine with amnesia one week ago. Symptoms subsequently resolved.   Continues Xarelto, no abnormal bleeding.   No CP, SOA, Cough.     Past Medical History:   Past Medical History:   Diagnosis Date    Clotting disorder 3/7/2020    Blood clot in leg and lung. Removal of blood clot in leg    Cluster headache     Deep vein thrombosis 3/7/2020    Family history of blood clots     Headache, tension-type     History of blood clots     Migraine     Ovarian cyst     Pulmonary embolism 03/07/2020    Blood clots in right leg and lungs    Vision loss     When migraines hit       Past Surgical History:   Past Surgical History:   Procedure Laterality Date    CARDIAC CATHETERIZATION Right 03/09/2020    Procedure: Peripheral angiography;  Surgeon: Margarito Yan MD;  Location: [facility] CATH INVASIVE LOCATION;  Service: Cardiovascular;  Laterality: Right;  Prone/ R. Popliteal Access    OTHER SURGICAL HISTORY      Blood clot removal- Right Leg      VASCULAR SURGERY      Blood clot removal   Has right iliac/femoral stent 2020    Family History:   Family History   Problem Relation Age of Onset    Breast cancer Paternal Aunt     Breast cancer Paternal Aunt        Social History:   Social History     Socioeconomic History    Marital status: Single   Tobacco Use    Smoking status: Never    Smokeless tobacco: Never   Vaping Use    Vaping status: Never Used   Substance and Sexual Activity     "Alcohol use: Yes     Alcohol/week: 1.0 standard drink of alcohol     Types: 1 Glasses of wine per week    Drug use: Never    Sexual activity: Yes     Partners: Male     Birth control/protection: I.U.D.       Medications:     Current Outpatient Medications:     clindamycin 1 % gel, Apply 1 Application topically to the appropriate area as directed 2 (Two) Times a Day., Disp: , Rfl:     Dapsone 5 % topical gel, Apply  topically to the appropriate area as directed 2 (Two) Times a Day., Disp: , Rfl:     Levonorgestrel (MIRENA, 52 MG, IU), by Intrauterine route., Disp: , Rfl:     Rimegepant Sulfate (Nurtec) 75 MG tablet dispersible tablet, Take 1 tablet by mouth Daily As Needed (migraine)., Disp: 8 tablet, Rfl: 3    spironolactone (ALDACTONE) 100 MG tablet, TAKE 1 AND 1/2 TABLET BY MOUTH DAILY, Disp: 135 tablet, Rfl: 3    Trifarotene (Aklief) 0.005 % cream, Apply  topically., Disp: , Rfl:     Xarelto 20 MG tablet, TAKE ONE TABLET BY MOUTH DAILY, Disp: 90 tablet, Rfl: 3    Allergies:   No Known Allergies    Objective     Vital Signs:   Vitals:    07/15/24 1531   BP: 124/63   Pulse: 81   Resp: 16   Temp: 98.4 °F (36.9 °C)   SpO2: 98%   Weight: 80 kg (176 lb 4.8 oz)   Height: 170.2 cm (67.01\")   PainSc: 0-No pain    Body mass index is 27.61 kg/m².   Pain Score    07/15/24 1531   PainSc: 0-No pain       Physical Exam:   General: No acute distress. Well appearing   HEENT: Normocephalic, atraumatic. Sclera anicteric.   Neck: supple, no adenopathy.   Cardiovascular: regular rate and rhythm. No murmurs.   Respiratory: Normal rate. Clear to auscultation bilaterally.  Abdomen: Soft, nontender, non distended with normoactive bowel sounds.   Lymph: no cervical, supraclavicular or axillary adenopathy.  Neuro: Alert and oriented x 3. No focal deficits.   Ext: Symmetric, no swelling.     Laboratory/Imaging Reviewed:                 Assessment / Plan      Assessment/Plan:     1.  Pulmonary embolism, provoked in the context of oral " contraceptives  2.  Family history of recurrent VTE in her father  -She presented with a provoked VTE event in the context of oral contraceptive use.  Workup for primary causes of thrombophilia included negative factor V Leiden mutation, normal levels of protein C and protein S.  Prothrombin gene mutation was negative.  Antithrombin levels were normal.  Anticardiolipin antibodies and beta-2 glycoprotein antibodies are negative.  Despite no evidence of thrombophilia, she does have persistent polycythemia which may increase her risk for further thrombosis.  -Because of her family history she has been continued on indefinite anticoagulation prior to consultation.  Despite no evidence of thrombophilia and unprovoked cause, I recommended continuation of anticoagulation at least until etiology of her polycythemia can be determined. She has now undergone extensive workup as below and appears to have a high affinity hemoglobin as the cause of her polycythemia.  We discussed that management of high affinity hemoglobin as it relates to primary prevention of thrombosis is not clear but given that she has a history of VTE associated with polycythemia, I would err on the side of continuing indefinite anticoagulation.  She is in agreement with this plan.  -Continue Xarelto daily.  -Recent labs reviewed and stable.  -Check labs today:   Orders Placed This Encounter   Procedures    Comprehensive Metabolic Panel    CBC & Differential         3.  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    -Negative JAK2, VANESSA-R, MPL, and exon 12 mutations.    -Erythropoietin is normal at 10.7.  -BCR-ABL was ordered but not collected but this is unlikely to be of high yield in context of negative bone marrow biopsy.  -She has no history of cardiopulmonary disease.  -Bone marrow biopsy is normal and does not explain her persistent polycythemia.  -P50 testing performed at [facility]  January 2024 notable for a low P50 of 17 (LLN 22) which is consistent with increased oxygen affinity of hemoglobin.   -We reviewed her [facility] laboratories testing which is negative for VHL related erythrocytosis and related conditions.  We discussed referral to genetic counseling or an academic center.  However there is an autosomal dominant pattern, and reduced P50 are highly suggestive of high affinity hemoglobin and I would recommend managing her as such.    4. Abnormal LFTs  -Liver US was normal in 2022. Labs show normal iron saturation/ferritin/no evidence for iron overload.  -CMP shows improvement in AST which still remains slightly above the normal range in January 2024    5.  Persistent headaches  -MRI and neurology notes reviewed.   -Follow up with neurology regarding new symptoms.     6.  Presence of vascular stents  -She will remain on chronic anticoagulation. I have discussed with Dr. Newsome. Not a candidate for stent removal. Agrees with full dose anticoagulation.     Follow Up:   6 mo     Reshma Baig MD  Hematology and Oncology

## 2024-07-16 ENCOUNTER — APPOINTMENT (OUTPATIENT)
Dept: GENERAL RADIOLOGY | Facility: HOSPITAL | Age: 34
End: 2024-07-16
Payer: COMMERCIAL

## 2024-07-16 ENCOUNTER — TELEPHONE (OUTPATIENT)
Dept: INTERNAL MEDICINE | Facility: CLINIC | Age: 34
End: 2024-07-16
Payer: COMMERCIAL

## 2024-07-16 ENCOUNTER — APPOINTMENT (OUTPATIENT)
Dept: CT IMAGING | Facility: HOSPITAL | Age: 34
End: 2024-07-16
Payer: COMMERCIAL

## 2024-07-16 ENCOUNTER — HOSPITAL ENCOUNTER (EMERGENCY)
Facility: HOSPITAL | Age: 34
Discharge: HOME | End: 2024-07-16
Attending: EMERGENCY MEDICINE
Payer: COMMERCIAL

## 2024-07-16 VITALS
TEMPERATURE: 97.9 F | DIASTOLIC BLOOD PRESSURE: 65 MMHG | HEART RATE: 82 BPM | WEIGHT: 170 LBS | OXYGEN SATURATION: 98 % | BODY MASS INDEX: 26.68 KG/M2 | SYSTOLIC BLOOD PRESSURE: 115 MMHG | RESPIRATION RATE: 16 BRPM | HEIGHT: 67 IN

## 2024-07-16 DIAGNOSIS — R55 NEAR SYNCOPE: Primary | ICD-10-CM

## 2024-07-16 DIAGNOSIS — H57.02 ANISOCORIA: ICD-10-CM

## 2024-07-16 LAB
ALBUMIN SERPL-MCNC: 4.4 G/DL (ref 3.5–5.2)
ALBUMIN/GLOB SERPL: 1.3 G/DL
ALP SERPL-CCNC: 59 U/L (ref 39–117)
ALT SERPL W P-5'-P-CCNC: 16 U/L (ref 1–33)
ANION GAP SERPL CALCULATED.3IONS-SCNC: 10.8 MMOL/L (ref 5–15)
AST SERPL-CCNC: 51 U/L (ref 1–32)
BASOPHILS # BLD AUTO: 0.04 10*3/MM3 (ref 0–0.2)
BASOPHILS NFR BLD AUTO: 0.5 % (ref 0–1.5)
BILIRUB SERPL-MCNC: 1.3 MG/DL (ref 0–1.2)
BUN SERPL-MCNC: 20 MG/DL (ref 6–20)
BUN/CREAT SERPL: 21.5 (ref 7–25)
CALCIUM SPEC-SCNC: 9.8 MG/DL (ref 8.6–10.5)
CHLORIDE SERPL-SCNC: 102 MMOL/L (ref 98–107)
CO2 SERPL-SCNC: 24.2 MMOL/L (ref 22–29)
CREAT SERPL-MCNC: 0.93 MG/DL (ref 0.57–1)
DEPRECATED RDW RBC AUTO: 45.8 FL (ref 37–54)
EGFRCR SERPLBLD CKD-EPI 2021: 83.9 ML/MIN/1.73
EOSINOPHIL # BLD AUTO: 0.05 10*3/MM3 (ref 0–0.4)
EOSINOPHIL NFR BLD AUTO: 0.6 % (ref 0.3–6.2)
ERYTHROCYTE [DISTWIDTH] IN BLOOD BY AUTOMATED COUNT: 13.2 % (ref 12.3–15.4)
FLUAV RNA RESP QL NAA+PROBE: NOT DETECTED
FLUBV RNA RESP QL NAA+PROBE: NOT DETECTED
GLOBULIN UR ELPH-MCNC: 3.4 GM/DL
GLUCOSE SERPL-MCNC: 90 MG/DL (ref 65–99)
HCG SERPL QL: NEGATIVE
HCT VFR BLD AUTO: 51.8 % (ref 34–46.6)
HGB BLD-MCNC: 17.5 G/DL (ref 12–15.9)
HOLD SPECIMEN: NORMAL
HOLD SPECIMEN: NORMAL
IMM GRANULOCYTES # BLD AUTO: 0.01 10*3/MM3 (ref 0–0.05)
IMM GRANULOCYTES NFR BLD AUTO: 0.1 % (ref 0–0.5)
LYMPHOCYTES # BLD AUTO: 1.58 10*3/MM3 (ref 0.7–3.1)
LYMPHOCYTES NFR BLD AUTO: 20.5 % (ref 19.6–45.3)
MAGNESIUM SERPL-MCNC: 1.9 MG/DL (ref 1.6–2.6)
MCH RBC QN AUTO: 31.5 PG (ref 26.6–33)
MCHC RBC AUTO-ENTMCNC: 33.8 G/DL (ref 31.5–35.7)
MCV RBC AUTO: 93.3 FL (ref 79–97)
MONOCYTES # BLD AUTO: 0.51 10*3/MM3 (ref 0.1–0.9)
MONOCYTES NFR BLD AUTO: 6.6 % (ref 5–12)
NEUTROPHILS NFR BLD AUTO: 5.51 10*3/MM3 (ref 1.7–7)
NEUTROPHILS NFR BLD AUTO: 71.7 % (ref 42.7–76)
NRBC BLD AUTO-RTO: 0 /100 WBC (ref 0–0.2)
PLATELET # BLD AUTO: 218 10*3/MM3 (ref 140–450)
PMV BLD AUTO: 9.4 FL (ref 6–12)
POTASSIUM SERPL-SCNC: 4 MMOL/L (ref 3.5–5.2)
PROT SERPL-MCNC: 7.8 G/DL (ref 6–8.5)
RBC # BLD AUTO: 5.55 10*6/MM3 (ref 3.77–5.28)
RSV RNA RESP QL NAA+PROBE: NOT DETECTED
SARS-COV-2 RNA RESP QL NAA+PROBE: NOT DETECTED
SODIUM SERPL-SCNC: 137 MMOL/L (ref 136–145)
TROPONIN T SERPL HS-MCNC: <6 NG/L
TSH SERPL DL<=0.05 MIU/L-ACNC: 2.08 UIU/ML (ref 0.27–4.2)
WBC NRBC COR # BLD AUTO: 7.7 10*3/MM3 (ref 3.4–10.8)
WHOLE BLOOD HOLD COAG: NORMAL
WHOLE BLOOD HOLD SPECIMEN: NORMAL

## 2024-07-16 PROCEDURE — 70498 CT ANGIOGRAPHY NECK: CPT

## 2024-07-16 PROCEDURE — 83735 ASSAY OF MAGNESIUM: CPT

## 2024-07-16 PROCEDURE — 70450 CT HEAD/BRAIN W/O DYE: CPT

## 2024-07-16 PROCEDURE — 84484 ASSAY OF TROPONIN QUANT: CPT

## 2024-07-16 PROCEDURE — 84703 CHORIONIC GONADOTROPIN ASSAY: CPT | Performed by: STUDENT IN AN ORGANIZED HEALTH CARE EDUCATION/TRAINING PROGRAM

## 2024-07-16 PROCEDURE — 93005 ELECTROCARDIOGRAM TRACING: CPT

## 2024-07-16 PROCEDURE — 25510000001 IOPAMIDOL 61 % SOLUTION: Performed by: STUDENT IN AN ORGANIZED HEALTH CARE EDUCATION/TRAINING PROGRAM

## 2024-07-16 PROCEDURE — 70496 CT ANGIOGRAPHY HEAD: CPT

## 2024-07-16 PROCEDURE — 71045 X-RAY EXAM CHEST 1 VIEW: CPT

## 2024-07-16 PROCEDURE — 99285 EMERGENCY DEPT VISIT HI MDM: CPT

## 2024-07-16 PROCEDURE — 87637 SARSCOV2&INF A&B&RSV AMP PRB: CPT

## 2024-07-16 PROCEDURE — 71275 CT ANGIOGRAPHY CHEST: CPT

## 2024-07-16 PROCEDURE — 25810000003 SODIUM CHLORIDE 0.9 % SOLUTION

## 2024-07-16 PROCEDURE — 80050 GENERAL HEALTH PANEL: CPT | Performed by: EMERGENCY MEDICINE

## 2024-07-16 RX ORDER — SODIUM CHLORIDE 0.9 % (FLUSH) 0.9 %
10 SYRINGE (ML) INJECTION AS NEEDED
Status: DISCONTINUED | OUTPATIENT
Start: 2024-07-16 | End: 2024-07-16 | Stop reason: HOSPADM

## 2024-07-16 RX ADMIN — IOPAMIDOL 75 ML: 612 INJECTION, SOLUTION INTRAVENOUS at 15:22

## 2024-07-16 RX ADMIN — SODIUM CHLORIDE 1000 ML: 9 INJECTION, SOLUTION INTRAVENOUS at 15:01

## 2024-07-16 RX ADMIN — IOPAMIDOL 75 ML: 612 INJECTION, SOLUTION INTRAVENOUS at 15:23

## 2024-07-16 NOTE — ED PROVIDER NOTES
"EMERGENCY DEPARTMENT ENCOUNTER    Pt Name: Christine Black  MRN: [id number]  Pt :   1990  Room Number:  01SF/01  Date of encounter:  2024  PCP: Erika Curiel APRN  ED Physician: Liborio Shirley DO    HPI:  Christine Black is a 34 y.o. female who presents to the ED with chief complaint of near syncope.  Patient reports symptom started today.  States that she was at work whenever she felt lightheaded like she was going to pass out.  States that she \"feels off\".  Duration constant.  No modifying factors.  Tried to go follow-up with her PCP, but was encouraged to come to the ED for evaluation.  States that she never lost consciousness.    Denies fever, chills, blurred vision, slurred speech, chest pain, neck pain, back pain, abdominal pain, numbness or weakness in the extremities, problems urination or bowel movements, problems with gait.    Does report that she had a migraine last week that lasted approximately 4-5 hours. She had associated confusion with this migraine, but those symptoms have since completely resolved.    Past medical history of VTE, blood clotting disorder, migraine headaches.    Currently takes Xarelto.    Currently follows with neurology.    PAST MEDICAL HISTORY  Past Medical History:   Diagnosis Date    Clotting disorder 3/7/2020    Blood clot in leg and lung. Removal of blood clot in leg    Cluster headache     Deep vein thrombosis 3/7/2020    Family history of blood clots     Headache, tension-type     History of blood clots     Migraine     Ovarian cyst     Pulmonary embolism 2020    Blood clots in right leg and lungs    Vision loss     When migraines hit     Current Outpatient Medications   Medication Instructions    clindamycin 1 % gel 1 Application, Topical, 2 Times Daily    Dapsone 5 % topical gel Topical, 2 Times Daily    Levonorgestrel (MIRENA, 52 MG, IU) Intrauterine    Nurtec 75 mg, Oral, Daily PRN    spironolactone (ALDACTONE) 150 mg, Oral, Daily    Trifarotene " (Aklief) 0.005 % cream Apply externally    Xarelto 20 MG tablet TAKE ONE TABLET BY MOUTH DAILY      PAST SURGICAL HISTORY  Past Surgical History:   Procedure Laterality Date    CARDIAC CATHETERIZATION Right 03/09/2020    Procedure: Peripheral angiography;  Surgeon: Margarito Yan MD;  Location: [facility] CATH INVASIVE LOCATION;  Service: Cardiovascular;  Laterality: Right;  Prone/ R. Popliteal Access    OTHER SURGICAL HISTORY      Blood clot removal- Right Leg      VASCULAR SURGERY      Blood clot removal       FAMILY HISTORY  Family History   Problem Relation Age of Onset    Breast cancer Paternal Aunt     Breast cancer Paternal Aunt        SOCIAL HISTORY  Social History     Socioeconomic History    Marital status: Single   Tobacco Use    Smoking status: Never    Smokeless tobacco: Never   Vaping Use    Vaping status: Never Used   Substance and Sexual Activity    Alcohol use: Yes     Alcohol/week: 1.0 standard drink of alcohol     Types: 1 Glasses of wine per week    Drug use: Never    Sexual activity: Yes     Partners: Male     Birth control/protection: I.U.D.     ALLERGIES  Patient has no known allergies.    REVIEW OF SYSTEMS  All systems reviewed and negative except for those discussed in HPI.     PHYSICAL EXAM  ED Triage Vitals [07/16/24 1420]   Temp Heart Rate Resp BP SpO2   97.9 °F (36.6 °C) 81 16 125/85 100 %      Temp src Heart Rate Source Patient Position BP Location FiO2 (%)   Oral Monitor Sitting Left arm --     I have reviewed the triage vital signs and nursing notes.    General: Alert.  Nontoxic appearance.  No acute distress.  Head: Normocephalic.  Atraumatic.  Eyes: Right pupil 4 mm.  Left pupil 2 mm. PERRLA.  EOMI.  No scleral icterus.  Cardiovascular: Regular rate and rhythm.  No murmurs.  No rubs.  2+ distal pulses bilaterally.  Respiratory: Equal breath sounds bilaterally.  No rales.  No rhonchi.  No wheezing.  GI: Abdomen is soft.  Nondistended.  Nontender to palpation.  No  rebound.  No guarding.  No CVA tenderness.  MSK: No muscle atrophy.  Neurologic: Oriented x 3.  Speech intact without dysarthria or aphasia. Cranial nerves II-XII intact.  Strength 5/5 bilateral upper and lower extremities.  Sensation to touch grossly intact bilaterally.  No focal deficits.  No pronator drift.  Normal finger-nose test.  Normal gait.  Skin: No erythema. No edema.  Psych: Normal mood and affect.     LAB RESULTS  Recent Results (from the past 24 hour(s))   Comprehensive Metabolic Panel    Collection Time: 07/16/24  3:00 PM    Specimen: Blood   Result Value Ref Range    Glucose 90 65 - 99 mg/dL    BUN 20 6 - 20 mg/dL    Creatinine 0.93 0.57 - 1.00 mg/dL    Sodium 137 136 - 145 mmol/L    Potassium 4.0 3.5 - 5.2 mmol/L    Chloride 102 98 - 107 mmol/L    CO2 24.2 22.0 - 29.0 mmol/L    Calcium 9.8 8.6 - 10.5 mg/dL    Total Protein 7.8 6.0 - 8.5 g/dL    Albumin 4.4 3.5 - 5.2 g/dL    ALT (SGPT) 16 1 - 33 U/L    AST (SGOT) 51 (H) 1 - 32 U/L    Alkaline Phosphatase 59 39 - 117 U/L    Total Bilirubin 1.3 (H) 0.0 - 1.2 mg/dL    Globulin 3.4 gm/dL    A/G Ratio 1.3 g/dL    BUN/Creatinine Ratio 21.5 7.0 - 25.0    Anion Gap 10.8 5.0 - 15.0 mmol/L    eGFR 83.9 >60.0 mL/min/1.73   Green Top (Gel)    Collection Time: 07/16/24  3:00 PM   Result Value Ref Range    Extra Tube Hold for add-ons.    Lavender Top    Collection Time: 07/16/24  3:00 PM   Result Value Ref Range    Extra Tube hold for add-on    Gold Top - SST    Collection Time: 07/16/24  3:00 PM   Result Value Ref Range    Extra Tube Hold for add-ons.    Light Blue Top    Collection Time: 07/16/24  3:00 PM   Result Value Ref Range    Extra Tube Hold for add-ons.    CBC Auto Differential    Collection Time: 07/16/24  3:00 PM    Specimen: Blood   Result Value Ref Range    WBC 7.70 3.40 - 10.80 10*3/mm3    RBC 5.55 (H) 3.77 - 5.28 10*6/mm3    Hemoglobin 17.5 (H) 12.0 - 15.9 g/dL    Hematocrit 51.8 (H) 34.0 - 46.6 %    MCV 93.3 79.0 - 97.0 fL    MCH 31.5 26.6 - 33.0  pg    MCHC 33.8 31.5 - 35.7 g/dL    RDW 13.2 12.3 - 15.4 %    RDW-SD 45.8 37.0 - 54.0 fl    MPV 9.4 6.0 - 12.0 fL    Platelets 218 140 - 450 10*3/mm3    Neutrophil % 71.7 42.7 - 76.0 %    Lymphocyte % 20.5 19.6 - 45.3 %    Monocyte % 6.6 5.0 - 12.0 %    Eosinophil % 0.6 0.3 - 6.2 %    Basophil % 0.5 0.0 - 1.5 %    Immature Grans % 0.1 0.0 - 0.5 %    Neutrophils, Absolute 5.51 1.70 - 7.00 10*3/mm3    Lymphocytes, Absolute 1.58 0.70 - 3.10 10*3/mm3    Monocytes, Absolute 0.51 0.10 - 0.90 10*3/mm3    Eosinophils, Absolute 0.05 0.00 - 0.40 10*3/mm3    Basophils, Absolute 0.04 0.00 - 0.20 10*3/mm3    Immature Grans, Absolute 0.01 0.00 - 0.05 10*3/mm3    nRBC 0.0 0.0 - 0.2 /100 WBC   High Sensitivity Troponin T    Collection Time: 07/16/24  3:01 PM    Specimen: Blood   Result Value Ref Range    HS Troponin T <6 <14 ng/L   Magnesium    Collection Time: 07/16/24  3:01 PM    Specimen: Blood   Result Value Ref Range    Magnesium 1.9 1.6 - 2.6 mg/dL   TSH    Collection Time: 07/16/24  3:01 PM    Specimen: Blood   Result Value Ref Range    TSH 2.080 0.270 - 4.200 uIU/mL   hCG, Serum, Qualitative    Collection Time: 07/16/24  3:01 PM    Specimen: Blood   Result Value Ref Range    HCG Qualitative Negative Negative   COVID-19, FLU A/B, RSV PCR 1 HR TAT - Swab, Nasopharynx    Collection Time: 07/16/24  3:37 PM    Specimen: Nasopharynx; Swab   Result Value Ref Range    COVID19 Not Detected Not Detected - Ref. Range    Influenza A PCR Not Detected Not Detected    Influenza B PCR Not Detected Not Detected    RSV, PCR Not Detected Not Detected       RADIOLOGY  CT Angiogram Chest Pulmonary Embolism    Result Date: 7/16/2024  PROCEDURE: CT ANGIOGRAM CHEST PULMONARY EMBOLISM-  HISTORY: Near syncope, hx of VTE, prior history of pulmonary embolism  TECHNIQUE: Thin section axial CT with IV contrast supplemented with 3D reconstructed MIP images.  FINDINGS:  Pulmonary vessels enhance in a normal fashion without evidence of embolic disease.  Thoracic aorta is normal in caliber without evidence of aneurysm or dissection.  No acute lung disease is present.  No pleural or pericardial effusion is seen. No adenopathy or mass lesion is present.      1. No evidence of pulmonary embolism. 2. No evidence of aortic dissection or aneurysm    This study was performed with techniques to keep radiation doses as low as reasonably achievable (ALARA). Individualized dose reduction techniques using automated exposure control or adjustment of vA and/or kV according to the patient size were employed.  This report was signed and finalized on 7/16/2024 3:30 PM by Heladio Laura MD.      CT Angiogram Head    Result Date: 7/16/2024  PROCEDURE: CT ANGIOGRAM HEAD-  HISTORY: Anasarca, headache  TECHNIQUE: Thin section axial CT with contrast with multiplanar reconstruction.   3 D reconstructions were performed on a separate workstation.  FINDINGS:  Internal carotid arteries: The petrous, cavernous and postcavernous ICAs are unremarkable. No aneurysm is seen.  Anterior cerebral arteries: Central anterior cerebral arteries are unremarkable. No aneurysm is seen. The A1 segment of the right JANEEN is severely hypoplastic as a normal vascular variant.  Middle cerebral arteries: Central middle cerebral arteries are unremarkable. No aneurysm is seen.  Basilar artery: Distal vertebral and basilar arteries are widely patent. No aneurysm is seen.  Posterior cerebral artery: Posterior cerebral arteries are patent centrally. No obvious aneurysm is seen.  Venous sinuses: Major venous sinuses are patent.      No evidence of acute large vessel occlusive disease or aneurysm    This study was performed with techniques to keep radiation doses as low as reasonably achievable (ALARA). Individualized dose reduction techniques using automated exposure control or adjustment of vA and/or kV according to the patient size were employed.  This report was signed and finalized on 7/16/2024 3:28 PM by Heladio  MD Keya.      CT Angiogram Neck    Result Date: 7/16/2024  PROCEDURE: CT ANGIOGRAM NECK-  HISTORY: Anasarca, headache  TECHNIQUE: Thin section axial CT with IV contrast supplemented with multiplanar reconstruction.   3 D reconstructions were performed on a separate workstation.  NASCET criteria and technique was utilized during interpretation.  FINDINGS:  Aortic arch:  Arch shows no significant narrowing. Great vessel origins are widely patent.  Right carotid:  No significant stenosis is seen of the cervical common or internal carotid artery. There is no evidence of dissection.  Left carotid:  No significant stenosis is seen of the cervical common or internal carotid artery. There is no evidence of dissection.  Vertebrals: Right vertebral artery is dominant.  No significant stenosis is present.       No evidence of cervical carotid or vertebral artery stenosis, occlusion or dissection   This study was performed with techniques to keep radiation doses as low as reasonably achievable (ALARA). Individualized dose reduction techniques using automated exposure control or adjustment of vA and/or kV according to the patient size were employed.   This report was signed and finalized on 7/16/2024 3:26 PM by Heladio Laura MD.      CT Head Without Contrast    Result Date: 7/16/2024  PROCEDURE: CT HEAD WO CONTRAST-  HISTORY: Syncopal episode  TECHNIQUE: Noncontrast exam  FINDINGS: Brain parenchyma is homogeneous without evidence of hemorrhage, mass effect or edema. No extra-axial abnormality is noted. Ventricles and cisterns appear normal.  The visualized sinuses, orbits and petrous temporal bones appear unremarkable.      Unremarkable unenhanced CT of the brain.   This study was performed with techniques to keep radiation doses as low as reasonably achievable (ALARA). Individualized dose reduction techniques using automated exposure control or adjustment of vA and/or kV according to the patient size were employed.    This  report was signed and finalized on 7/16/2024 3:25 PM by Heladio Laura MD.      XR Chest 1 View    Result Date: 7/16/2024  PROCEDURE: XR CHEST 1 VW-  HISTORY: Syncopal episode, weakness  COMPARISON: 2/14/2023  FINDINGS:  Portable view of the chest demonstrates the lungs to be grossly clear. There is no evidence of effusion, pneumothorax or other significant pleural disease. The mediastinum is unremarkable.  The heart size is normal.      Unremarkable portable chest.    This report was signed and finalized on 7/16/2024 2:54 PM by Heladio Laura MD.       PROCEDURES  Procedures  Interval: baseline  1a. Level of Consciousness: 0-->Alert, keenly responsive  1b. LOC Questions: 0-->Answers both questions correctly  1c. LOC Commands: 0-->Performs both tasks correctly  2. Best Gaze: 0-->Normal  3. Visual: 0-->No visual loss  4. Facial Palsy: 0-->Normal symmetrical movements  5a. Motor Arm, Left: 0-->No drift, limb holds 90 (or 45) degrees for full 10 secs  5b. Motor Arm, Right: 0-->No drift, limb holds 90 (or 45) degrees for full 10 secs  6a. Motor Leg, Left: 0-->No drift, leg holds 30 degree position for full 5 secs  6b. Motor Leg, Right: 0-->No drift, leg holds 30 degree position for full 5 secs  7. Limb Ataxia: 0-->Absent  8. Sensory: 0-->Normal, no sensory loss  9. Best Language: 0-->No aphasia, normal  10. Dysarthria: 0-->Normal  11. Extinction and Inattention (formerly Neglect): 0-->No abnormality    Total (NIH Stroke Scale): 0    RISK STRATIFICATION    MEDICATIONS GIVEN IN ER  Medications   sodium chloride 0.9 % flush 10 mL (has no administration in time range)   sodium chloride 0.9 % bolus 1,000 mL (1,000 mL Intravenous New Bag 7/16/24 1501)   iopamidol (ISOVUE-300) 61 % injection 100 mL (75 mL Intravenous Given 7/16/24 1523)   iopamidol (ISOVUE-300) 61 % injection 100 mL (75 mL Intravenous Given 7/16/24 1522)     MEDICAL DECISION MAKING  34 y.o. female with past medical history listed above who presents with  lightheadedness and near syncope.    Vital signs within normal limits.  Pulse ox 100% on room air.    Based on clinical presentation and physical exam, differential diagnosis includes, but is not limited to, vasovagal syncope, orthostatic hypotension, dehydration, cardiac arrhythmia, metabolic derangement, pulmonary embolism, intracranial structural and vascular abnormality.    NIH stroke scale 0 listed above.  No clinical indication for ED stroke alert.    At least 3 different tests have been ordered on this patient.    Please see ED course below for my interpretation of the ED workup.  ED Course as of 07/16/24 1731   Tue Jul 16, 2024   1453 ECG 12 Lead Syncope  EKG per my interpretation normal sinus rhythm, rate 79, normal axis, no ST segment elevation or depression, normal QRS and QTc intervals, no Brugada pattern, no Arin-Parkinson-White pattern.   [JS]   1533 I have independently reviewed and interpreted the imaging listed above.  My interpretations are listed below:    - Chest x-ray clear lungs bilaterally.    - CT head negative for intra hemorrhage or mass effect.    - CTA head neck negative for large vessel occlusion or aneurysm.    - CTA chest negative for saddle pulmonary embolism.     [JS]   1629 I reviewed the labs listed above. Clinically unremarkable.    Notable findings are highlighted below.    Old laboratory data was reviewed from the medical records and compared to today's results. [JS]   1629 CBC & Differential(!) [JS]   1629 Hemoglobin(!): 17.5 [JS]   1629 Comprehensive Metabolic Panel(!) [JS]   1629 AST (SGOT)(!): 51 [JS]   1629 HS Troponin T: <6 [JS]   1629 TSH Baseline: 2.080 [JS]   1629 Magnesium: 1.9 [JS]   1629 HCG Qualitative: Negative [JS]   1629 COVID19: Not Detected [JS]   1629 Influenza A PCR: Not Detected [JS]   1629 Influenza B PCR: Not Detected [JS]   1629 RSV, PCR: Not Detected [JS]      ED Course User Index  [JS] Liborio Shirley DO     On re-evaluation, patient resting  comfortably.  States symptoms have improved following therapy. Vital signs remained stable on room air.  Patient was ambulatory in the ED with steady gait.  Able to tolerate oral intake appropriately.    I discussed the findings of the ED workup with the patient at bedside.  No clinical indication for admission.  I recommended outpatient follow-up with PCP/cardiology.  Patient was deemed medically stable for discharge with close outpatient follow-up and strict ED return precautions. Patient agreeable with plan and disposition.    Chronic conditions affecting care: None    Social determinants of health impacting treatment or disposition: None    REPEAT VITAL SIGNS  AS OF 17:31 EDT VITALS:  BP - 115/65  HR - 82  TEMP - 97.9 °F (36.6 °C) (Oral)  O2 SATS - 98%    DIAGNOSIS  Final diagnoses:   Near syncope   Anisocoria     DISPOSITION  ED Disposition       ED Disposition   Discharge    Condition   Stable    Comment   --                     Please note that portions of this document were completed with voice recognition software.        Liborio Shirley DO  07/16/24 [id number]

## 2024-07-16 NOTE — TELEPHONE ENCOUNTER
Caller: Christine Black    Relationship to patient: Self    Best call back number: [phone]     Chief complaint: DISORIENTED, FEELING LIKE PASSING OUT, NAUSEA, COLD SWEATS, 2 HOURS STILL ONGOING, BLOOD CLOT HISTORY, RAPID PULSE     Patient directed to call 911 or go to their nearest emergency room.     Patient verbalized understanding: [x] Yes  [] No   - - -

## 2024-07-16 NOTE — Clinical Note
[facility] EMERGENCY DEPARTMENT  [address] [address]  Phone: [phone]    Christine Black was seen and treated in our emergency department on 7/16/2024.  She may return to work on 07/18/2024.         Thank you for choosing [facility].    Liborio Shirley DO

## 2024-07-24 ENCOUNTER — OFFICE VISIT (OUTPATIENT)
Dept: CARDIOLOGY | Facility: CLINIC | Age: 34
End: 2024-07-24
Payer: COMMERCIAL

## 2024-07-24 VITALS
RESPIRATION RATE: 17 BRPM | SYSTOLIC BLOOD PRESSURE: 104 MMHG | BODY MASS INDEX: 27.65 KG/M2 | HEIGHT: 67 IN | DIASTOLIC BLOOD PRESSURE: 62 MMHG | HEART RATE: 104 BPM | WEIGHT: 176.2 LBS | OXYGEN SATURATION: 100 %

## 2024-07-24 DIAGNOSIS — Z95.828 S/P INSERTION OF ILIAC ARTERY STENT: ICD-10-CM

## 2024-07-24 DIAGNOSIS — R42 DIZZINESS: ICD-10-CM

## 2024-07-24 DIAGNOSIS — R00.2 PALPITATIONS: ICD-10-CM

## 2024-07-24 DIAGNOSIS — Z86.69 HISTORY OF ATYPICAL MIGRAINE: ICD-10-CM

## 2024-07-24 DIAGNOSIS — Z86.718 HISTORY OF BLOOD CLOTS: ICD-10-CM

## 2024-07-24 DIAGNOSIS — R55 NEAR SYNCOPE: Primary | ICD-10-CM

## 2024-07-24 NOTE — PROGRESS NOTES
[facility] Cardiology    Office Consult     Christine Black  [id number]  2024    Referred By: Liborio Shirley DO    Chief Complaint   Patient presents with    pre-syncope     1 episode x 1 week ago lasted 24 hours with aura. No migraines present.        Subjective     History of Present Illness:   Christine Black is a 34 y.o. female who presents to the Cardiology Clinic for follow up after presenting to the emergency department with near syncope 2024.  Patient with past health history significant for VTE, Pulmonary Embolism chronically anticoagulated with Xarelto, blood clotting disorder, and migraine headaches.  She does have a remote history of SVT documented.  In , she was evaluated by Dr. Yan for a DVT and had angiography with runoff and mechanical thrombectomy of her right lower extremity with stenting placed to the iliac.  Patient states she was at work and began to feel very lightheaded and thought she might pass out.  She contacted her PCP and it was recommended she proceed to the emergency department for for further evaluation.  Her labs appeared to be stable at her baseline, CTs of head, neck, and chest were all normal.  She was given a liter of fluids and was discharged home to follow up with cardiology.  She states the dizziness continued for several days after being evaluated and she felt very unsteady on her feet.  She states she felt the room was spinning and symptoms were worsened with position changes.  Reports drinks >100oz of water daily.  No prior illness reports.  No chest pain or shortness of breath.  Reports she possibly had SVT in the hospital with her thrombectomy and was on a beta blocker for about 3 months after being discharged at which time Dr. Yan advised she could discontinue.     Past Cardiac Testin. Last Coronary Angio: --Unilateral extremity venous angiography with runoff with mechanical thrombectomy of RLE with stent placed to the  iliac.  2. Prior Stress Testing: None  3. Last Echo: None  4. Prior Holter Monitor:  None     Review of Systems   Constitutional:  Negative for activity change and fatigue.   Respiratory:  Negative for chest tightness and shortness of breath.    Cardiovascular:  Positive for palpitations. Negative for chest pain and leg swelling.   Neurological:  Positive for dizziness and headache.   All other systems reviewed and are negative.       Past Medical History:   Diagnosis Date    Clotting disorder 3/7/2020    Blood clot in leg and lung. Removal of blood clot in leg    Cluster headache     Deep vein thrombosis 3/7/2020    Family history of blood clots     Headache, tension-type     History of blood clots     Migraine     Near syncope 7/24/2024    Ovarian cyst     Pulmonary embolism 03/07/2020    Blood clots in right leg and lungs    Vision loss     When migraines hit       Past Surgical History:   Procedure Laterality Date    CARDIAC CATHETERIZATION Right 03/09/2020    Procedure: Peripheral angiography;  Surgeon: Margarito Yan MD;  Location: [facility] CATH INVASIVE LOCATION;  Service: Cardiovascular;  Laterality: Right;  Prone/ R. Popliteal Access    OTHER SURGICAL HISTORY      Blood clot removal- Right Leg      VASCULAR SURGERY      Blood clot removal       Family History   Problem Relation Age of Onset    Breast cancer Paternal Aunt     Breast cancer Paternal Aunt        Social History     Socioeconomic History    Marital status: Single   Tobacco Use    Smoking status: Never    Smokeless tobacco: Never   Vaping Use    Vaping status: Never Used   Substance and Sexual Activity    Alcohol use: Yes     Alcohol/week: 1.0 standard drink of alcohol     Types: 1 Glasses of wine per week    Drug use: Never    Sexual activity: Yes     Partners: Male     Birth control/protection: I.U.D.         Current Outpatient Medications:     clindamycin 1 % gel, Apply 1 Application topically to the appropriate area as  "directed 2 (Two) Times a Day., Disp: , Rfl:     Dapsone 5 % topical gel, Apply  topically to the appropriate area as directed 2 (Two) Times a Day., Disp: , Rfl:     Levonorgestrel (MIRENA, 52 MG, IU), by Intrauterine route., Disp: , Rfl:     Rimegepant Sulfate (Nurtec) 75 MG tablet dispersible tablet, Take 1 tablet by mouth Daily As Needed (migraine)., Disp: 8 tablet, Rfl: 3    spironolactone (ALDACTONE) 100 MG tablet, TAKE 1 AND 1/2 TABLET BY MOUTH DAILY, Disp: 135 tablet, Rfl: 3    Trifarotene (Aklief) 0.005 % cream, Apply  topically., Disp: , Rfl:     Xarelto 20 MG tablet, TAKE ONE TABLET BY MOUTH DAILY, Disp: 90 tablet, Rfl: 3    No Known Allergies    Objective     Vitals:    07/24/24 0845 07/24/24 0850 07/24/24 0852 07/24/24 0853   BP: 118/72 122/64 120/64 104/62   BP Location: Left arm  Left arm Left arm   Patient Position: Sitting Lying Sitting Standing   Cuff Size: Adult Adult     Pulse: 90 91 92 104   Resp: 17      SpO2: 98% 99% 100% 100%   Weight: 79.9 kg (176 lb 3.2 oz)      Height: 170.2 cm (67\")        Body mass index is 27.6 kg/m².    Physical Exam  Vitals and nursing note reviewed.   Constitutional:       General: She is not in acute distress.     Appearance: Normal appearance. She is not toxic-appearing.   HENT:      Head: Normocephalic.      Mouth/Throat:      Mouth: Mucous membranes are moist.   Eyes:      Pupils: Pupils are equal, round, and reactive to light.   Cardiovascular:      Rate and Rhythm: Normal rate and regular rhythm.      Pulses: Normal pulses.      Heart sounds: Normal heart sounds. No murmur heard.  Pulmonary:      Effort: Pulmonary effort is normal.      Breath sounds: Normal breath sounds. No wheezing, rhonchi or rales.   Musculoskeletal:      Right lower leg: No edema.      Left lower leg: No edema.   Skin:     General: Skin is warm and dry.      Capillary Refill: Capillary refill takes less than 2 seconds.   Neurological:      Mental Status: She is alert and oriented to person, " place, and time. Mental status is at baseline.   Psychiatric:         Mood and Affect: Mood normal.         Behavior: Behavior normal.         Thought Content: Thought content normal.         Results Review:   I reviewed the patient's new clinical results.    Procedures    Assessment & Plan  Dizziness  -CT imaging in ED all normal--head, neck, chest  -Does sound suspicious for vertigo as she reports room spinning and symptoms worse with changing position--episode lasted several days  -Denies any current symptoms since initial resolution  -Orthostatics normal  Near syncope  -Will obtain echocardiogram to evaluate for any valvular abnormalities  -Orthostatics normal    Palpitations  -History of sustained SVT with prior beta blocker use  -States she felt palpitations the whole time she had dizziness/ near syncope  -EKG normal sinus rhythm from ED  -Will place 30 day MCOT to rule out any arrhythmias    S/P insertion of iliac artery stent    History of blood clots  -Chronically anticoagulated with Xarelto  -Followed by Hematology    History of atypical migraine  -Had been evaluated for atypical migraine the same week as this episode of dizziness  -Could have contributed to several day episode described  -Followed by Neurology      Orders Placed This Encounter   Procedures    Mobile Cardiac Outpatient Telemetry    Adult Transthoracic Echo Complete W/ Cont if Necessary Per Protocol        Preventative Cardiology:   Tobacco Cessation: N/A   Advance Care Planning: ACP discussion was held with the patient during this visit. Patient does not have an advance directive, declines further assistance.     Follow Up:   Return in about 6 weeks (around 9/4/2024) for Next scheduled follow up--testing results.      Thank you for allowing me to participate in the care of your patient. Please to not hesitate to contact me with additional questions or concerns.     MAVERICK Barros

## 2024-07-24 NOTE — ASSESSMENT & PLAN NOTE
-Had been evaluated for atypical migraine the same week as this episode of dizziness  -Could have contributed to several day episode described  -Followed by Neurology

## 2024-09-03 ENCOUNTER — TELEPHONE (OUTPATIENT)
Dept: ONCOLOGY | Facility: CLINIC | Age: 34
End: 2024-09-03
Payer: COMMERCIAL

## 2024-09-03 NOTE — TELEPHONE ENCOUNTER
Caller: Christine Black    Relationship: Self    Best call back number: [phone]    What orders are you requesting (i.e. lab or imaging): LABS    In what timeframe would the patient need to come in: PATIENT IS THERE NOW    Where will you receive your lab/imaging services:  [facility] [place] [phone]

## 2024-09-04 ENCOUNTER — LAB (OUTPATIENT)
Dept: LAB | Facility: HOSPITAL | Age: 34
End: 2024-09-04
Payer: COMMERCIAL

## 2024-09-04 DIAGNOSIS — D75.1 POLYCYTHEMIA: ICD-10-CM

## 2024-09-04 LAB
ALBUMIN SERPL-MCNC: 4.6 G/DL (ref 3.5–5.2)
ALBUMIN/GLOB SERPL: 1.5 G/DL
ALP SERPL-CCNC: 56 U/L (ref 39–117)
ALT SERPL W P-5'-P-CCNC: 12 U/L (ref 1–33)
ANION GAP SERPL CALCULATED.3IONS-SCNC: 13.5 MMOL/L (ref 5–15)
AST SERPL-CCNC: 52 U/L (ref 1–32)
BASOPHILS # BLD AUTO: 0.04 10*3/MM3 (ref 0–0.2)
BASOPHILS NFR BLD AUTO: 0.6 % (ref 0–1.5)
BILIRUB SERPL-MCNC: 1.6 MG/DL (ref 0–1.2)
BUN SERPL-MCNC: 23 MG/DL (ref 6–20)
BUN/CREAT SERPL: 26.1 (ref 7–25)
CALCIUM SPEC-SCNC: 9.6 MG/DL (ref 8.6–10.5)
CHLORIDE SERPL-SCNC: 100 MMOL/L (ref 98–107)
CO2 SERPL-SCNC: 22.5 MMOL/L (ref 22–29)
CREAT SERPL-MCNC: 0.88 MG/DL (ref 0.57–1)
DEPRECATED RDW RBC AUTO: 44.3 FL (ref 37–54)
EGFRCR SERPLBLD CKD-EPI 2021: 89.7 ML/MIN/1.73
EOSINOPHIL # BLD AUTO: 0.05 10*3/MM3 (ref 0–0.4)
EOSINOPHIL NFR BLD AUTO: 0.7 % (ref 0.3–6.2)
ERYTHROCYTE [DISTWIDTH] IN BLOOD BY AUTOMATED COUNT: 12.9 % (ref 12.3–15.4)
GLOBULIN UR ELPH-MCNC: 3.1 GM/DL
GLUCOSE SERPL-MCNC: 95 MG/DL (ref 65–99)
HCT VFR BLD AUTO: 50.3 % (ref 34–46.6)
HGB BLD-MCNC: 17.2 G/DL (ref 12–15.9)
IMM GRANULOCYTES # BLD AUTO: 0.01 10*3/MM3 (ref 0–0.05)
IMM GRANULOCYTES NFR BLD AUTO: 0.1 % (ref 0–0.5)
LYMPHOCYTES # BLD AUTO: 1.68 10*3/MM3 (ref 0.7–3.1)
LYMPHOCYTES NFR BLD AUTO: 24.1 % (ref 19.6–45.3)
MCH RBC QN AUTO: 32.1 PG (ref 26.6–33)
MCHC RBC AUTO-ENTMCNC: 34.2 G/DL (ref 31.5–35.7)
MCV RBC AUTO: 93.8 FL (ref 79–97)
MONOCYTES # BLD AUTO: 0.49 10*3/MM3 (ref 0.1–0.9)
MONOCYTES NFR BLD AUTO: 7 % (ref 5–12)
NEUTROPHILS NFR BLD AUTO: 4.71 10*3/MM3 (ref 1.7–7)
NEUTROPHILS NFR BLD AUTO: 67.5 % (ref 42.7–76)
NRBC BLD AUTO-RTO: 0 /100 WBC (ref 0–0.2)
PLATELET # BLD AUTO: 224 10*3/MM3 (ref 140–450)
PMV BLD AUTO: 9.9 FL (ref 6–12)
POTASSIUM SERPL-SCNC: 4.5 MMOL/L (ref 3.5–5.2)
PROT SERPL-MCNC: 7.7 G/DL (ref 6–8.5)
RBC # BLD AUTO: 5.36 10*6/MM3 (ref 3.77–5.28)
SODIUM SERPL-SCNC: 136 MMOL/L (ref 136–145)
WBC NRBC COR # BLD AUTO: 6.98 10*3/MM3 (ref 3.4–10.8)

## 2024-09-04 PROCEDURE — 80053 COMPREHEN METABOLIC PANEL: CPT

## 2024-09-04 PROCEDURE — 85025 COMPLETE CBC W/AUTO DIFF WBC: CPT

## 2024-09-04 PROCEDURE — 36415 COLL VENOUS BLD VENIPUNCTURE: CPT

## 2024-09-04 NOTE — TELEPHONE ENCOUNTER
After clarifying what labs Dr. Baig needed with Dr. Baig, patient contacted back and advised to get the CBC and CMP completed that was ordered.  Patient advised per MD and verbalized understanding.  Patient stated she will go to the [facility] to complete.  Do not fax orders.

## 2024-09-16 ENCOUNTER — OFFICE VISIT (OUTPATIENT)
Dept: CARDIOLOGY | Facility: CLINIC | Age: 34
End: 2024-09-16
Payer: COMMERCIAL

## 2024-09-16 VITALS
WEIGHT: 174 LBS | HEIGHT: 67 IN | SYSTOLIC BLOOD PRESSURE: 112 MMHG | DIASTOLIC BLOOD PRESSURE: 64 MMHG | BODY MASS INDEX: 27.31 KG/M2 | OXYGEN SATURATION: 99 % | HEART RATE: 71 BPM

## 2024-09-16 DIAGNOSIS — I26.99 OTHER PULMONARY EMBOLISM WITHOUT ACUTE COR PULMONALE, UNSPECIFIED CHRONICITY: Chronic | ICD-10-CM

## 2024-09-16 DIAGNOSIS — I47.10 SUSTAINED SVT: Primary | Chronic | ICD-10-CM

## 2024-09-16 DIAGNOSIS — I82.521 CHRONIC DEEP VEIN THROMBOSIS (DVT) OF RIGHT ILIAC VEIN: ICD-10-CM

## 2024-09-16 PROCEDURE — 99213 OFFICE O/P EST LOW 20 MIN: CPT | Performed by: INTERNAL MEDICINE

## 2024-11-14 ENCOUNTER — OFFICE VISIT (OUTPATIENT)
Dept: INTERNAL MEDICINE | Facility: CLINIC | Age: 34
End: 2024-11-14
Payer: COMMERCIAL

## 2024-11-14 VITALS
RESPIRATION RATE: 20 BRPM | TEMPERATURE: 97.7 F | HEART RATE: 83 BPM | OXYGEN SATURATION: 100 % | SYSTOLIC BLOOD PRESSURE: 123 MMHG | WEIGHT: 173 LBS | BODY MASS INDEX: 27.15 KG/M2 | HEIGHT: 67 IN | DIASTOLIC BLOOD PRESSURE: 78 MMHG

## 2024-11-14 DIAGNOSIS — R05.9 COUGH, UNSPECIFIED TYPE: Primary | ICD-10-CM

## 2024-11-14 DIAGNOSIS — H66.003 ACUTE SUPPURATIVE OTITIS MEDIA OF BOTH EARS WITHOUT SPONTANEOUS RUPTURE OF TYMPANIC MEMBRANES, RECURRENCE NOT SPECIFIED: ICD-10-CM

## 2024-11-14 DIAGNOSIS — J06.0 ACUTE LARYNGOPHARYNGITIS: ICD-10-CM

## 2024-11-14 DIAGNOSIS — J01.00 ACUTE NON-RECURRENT MAXILLARY SINUSITIS: ICD-10-CM

## 2024-11-14 LAB
EXPIRATION DATE: NORMAL
EXPIRATION DATE: NORMAL
FLUAV AG UPPER RESP QL IA.RAPID: NOT DETECTED
FLUBV AG UPPER RESP QL IA.RAPID: NOT DETECTED
INTERNAL CONTROL: NORMAL
INTERNAL CONTROL: NORMAL
Lab: NORMAL
Lab: NORMAL
S PYO AG THROAT QL: NEGATIVE
SARS-COV-2 AG UPPER RESP QL IA.RAPID: NOT DETECTED

## 2024-11-14 PROCEDURE — 99213 OFFICE O/P EST LOW 20 MIN: CPT | Performed by: INTERNAL MEDICINE

## 2024-11-14 PROCEDURE — 87428 SARSCOV & INF VIR A&B AG IA: CPT | Performed by: INTERNAL MEDICINE

## 2024-11-14 PROCEDURE — 87880 STREP A ASSAY W/OPTIC: CPT | Performed by: INTERNAL MEDICINE

## 2024-11-14 RX ORDER — CEPHALEXIN 500 MG/1
500 CAPSULE ORAL 3 TIMES DAILY
Qty: 21 CAPSULE | Refills: 0 | Status: SHIPPED | OUTPATIENT
Start: 2024-11-14

## 2024-11-14 NOTE — PROGRESS NOTES
"Chief Complaint  Cough (Symptoms started yesterday with mucus sputum , denies all other symptoms, no fever) and Sore Throat    Subjective        Christine Black presents to [facility]  HPI: Patient is here  complaining of   sore throat   ,patient has been trying over the counter medications with not much relief in the symptoms , patient also complains of cough with sputum which started today, she denies a fever  \    Objective   Vital Signs:  /78   Pulse 83   Temp 97.7 °F (36.5 °C)   Resp 20   Ht 170.2 cm (67.01\")   Wt 78.5 kg (173 lb)   SpO2 100%   BMI 27.09 kg/m²   Estimated body mass index is 27.09 kg/m² as calculated from the following:    Height as of this encounter: 170.2 cm (67.01\").    Weight as of this encounter: 78.5 kg (173 lb).            Physical Exam  Vitals and nursing note reviewed.   Constitutional:       General: She is not in acute distress.     Appearance: Normal appearance. She is not diaphoretic.   HENT:      Head: Normocephalic and atraumatic.      Right Ear: External ear normal.      Left Ear: External ear normal.      Nose: Rhinorrhea present.      Right Sinus: Maxillary sinus tenderness present.      Left Sinus: Maxillary sinus tenderness present.      Mouth/Throat:      Pharynx: Posterior oropharyngeal erythema present.   Eyes:      Extraocular Movements: Extraocular movements intact.      Conjunctiva/sclera: Conjunctivae normal.   Neck:      Trachea: Trachea normal.   Cardiovascular:      Rate and Rhythm: Normal rate and regular rhythm.      Heart sounds: Normal heart sounds.   Pulmonary:      Effort: Pulmonary effort is normal. No respiratory distress.      Breath sounds: Normal breath sounds.   Abdominal:      General: Abdomen is flat.   Musculoskeletal:      Cervical back: Neck supple.      Comments: Moves all limbs   Skin:     General: Skin is warm.   Neurological:      Mental Status: She is alert and oriented to person, place, and time.      " Comments: No gross motor or sensory deficits        Result Review :  The following data was reviewed by: Nina Collado MD on 11/14/2024:    Covid Tests          7/16/2024    15:37   Common Labs   COVID19 Not Detected      Strep          11/14/2024    12:52   Common Labs   POC Strep A, Molecular Negative                Assessment and Plan   Diagnoses and all orders for this visit:    1. Cough, unspecified type (Primary)  -     POCT SARS-CoV-2 + Flu Antigen MECHELLE  -     POCT rapid strep A    2. Acute non-recurrent maxillary sinusitis  -     cephalexin (KEFLEX) 500 MG capsule; Take 1 capsule by mouth 3 (Three) Times a Day.  Dispense: 21 capsule; Refill: 0    3. Acute suppurative otitis media of both ears without spontaneous rupture of tympanic membranes, recurrence not specified  -     cephalexin (KEFLEX) 500 MG capsule; Take 1 capsule by mouth 3 (Three) Times a Day.  Dispense: 21 capsule; Refill: 0    4. Acute laryngopharyngitis  -     cephalexin (KEFLEX) 500 MG capsule; Take 1 capsule by mouth 3 (Three) Times a Day.  Dispense: 21 capsule; Refill: 0     Plan :   1.  Cough: In office flu, strep, COVID test negative  3.Acute suppurative otitis media: will   start oral antibiotics    4.Acute maxillary sinusitis:   will   start oral antibiotics   5. Acute laryngopharyngitis:  will   Start  oral antibiotics           Follow Up   No follow-ups on file.  Patient was given instructions and counseling regarding her condition or for health maintenance advice. Please see specific information pulled into the AVS if appropriate.

## 2024-12-23 ENCOUNTER — OFFICE VISIT (OUTPATIENT)
Dept: INTERNAL MEDICINE | Facility: CLINIC | Age: 34
End: 2024-12-23
Payer: COMMERCIAL

## 2024-12-23 VITALS
DIASTOLIC BLOOD PRESSURE: 62 MMHG | BODY MASS INDEX: 27.15 KG/M2 | HEART RATE: 80 BPM | WEIGHT: 173 LBS | HEIGHT: 67 IN | SYSTOLIC BLOOD PRESSURE: 112 MMHG | TEMPERATURE: 98.2 F | OXYGEN SATURATION: 98 %

## 2024-12-23 DIAGNOSIS — Z13.220 SCREENING FOR LIPID DISORDERS: ICD-10-CM

## 2024-12-23 DIAGNOSIS — Z79.01 CURRENT USE OF LONG TERM ANTICOAGULATION: ICD-10-CM

## 2024-12-23 DIAGNOSIS — I82.521 CHRONIC DEEP VEIN THROMBOSIS (DVT) OF RIGHT ILIAC VEIN: ICD-10-CM

## 2024-12-23 DIAGNOSIS — Z13.0 SCREENING FOR DISORDER OF BLOOD AND BLOOD-FORMING ORGANS: ICD-10-CM

## 2024-12-23 DIAGNOSIS — Z00.00 ENCOUNTER FOR ANNUAL PHYSICAL EXAMINATION EXCLUDING GYNECOLOGICAL EXAMINATION IN A PATIENT OLDER THAN 17 YEARS: ICD-10-CM

## 2024-12-23 DIAGNOSIS — Z76.89 ENCOUNTER TO ESTABLISH CARE: Primary | ICD-10-CM

## 2024-12-23 PROCEDURE — 99395 PREV VISIT EST AGE 18-39: CPT | Performed by: NURSE PRACTITIONER

## 2024-12-23 NOTE — PROGRESS NOTES
Chief Complaint   Patient presents with    Annual Exam       Christine Black is a 34 y.o. female and is here for a comprehensive physical exam.     H/O blood clots, PE. Taking xarelto as prescribed.  Bone marrow biopsy in .  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      Migraines. Follows Neurology. Taking nurtec, feels like it makes migraines worse.  Going to chiropractor for the past year or so, feels headaches are less frequent and less intense.       History:  LMP: currently menstruating. Patient has had an implant. Aware this may increase risk of further blood clots.    Menarche: 15 years old  Sexual activity:  heterosexual, monogamous.  Relationship x 5 years.  Last pap date: 2 years, follows gynecology.   Abnormal pap? no  : 0  Para: 0    Do you take any herbs or supplements that were not prescribed by a doctor? no    Health Habits:  Dental Exam. up to date  Eye Exam. up to date  Exercise: 5 times/week.  Current exercise activities include: cardiovascular workout on exercise equipment and light weights/[employer]    Health Maintenance   Topic Date Due    COVID-19 Vaccine ( - - season) 2025 (Originally 2024)    INFLUENZA VACCINE  2025 (Originally 2024)    TDAP/TD VACCINES ( - Tdap) 2025 (Originally 1/3/2011)    ANNUAL PHYSICAL  2025    BMI FOLLOWUP  2025    PAP SMEAR  2026    HEPATITIS C SCREENING  Completed    Pneumococcal Vaccine 0-64  Aged Out       PMH, PSH, SocHx, FamHx, Allergies, and Medications: Reviewed and updated in the Visit Navigator.     No Known Allergies  Past Medical History:   Diagnosis Date    Clotting disorder 3/7/2020    Blood clot in leg and lung. Removal of blood clot in leg    Cluster headache     Deep vein thrombosis 3/7/2020    Family history of blood clots     Headache, tension-type     History of blood clots     Migraine     Near syncope 2024    Ovarian cyst     Pulmonary embolism 2020  "   Blood clots in right leg and lungs    Vision loss     When migraines hit     Past Surgical History:   Procedure Laterality Date    CARDIAC CATHETERIZATION Right 03/09/2020    Procedure: Peripheral angiography;  Surgeon: Margarito Yan MD;  Location: [facility] CATH INVASIVE LOCATION;  Service: Cardiovascular;  Laterality: Right;  Prone/ R. Popliteal Access    OTHER SURGICAL HISTORY      Blood clot removal- Right Leg      VASCULAR SURGERY      Blood clot removal     Social History     Socioeconomic History    Marital status: Single   Tobacco Use    Smoking status: Never    Smokeless tobacco: Never   Vaping Use    Vaping status: Never Used   Substance and Sexual Activity    Alcohol use: Yes     Alcohol/week: 1.0 standard drink of alcohol    Drug use: Never    Sexual activity: Yes     Partners: Male     Birth control/protection: I.U.D.     Family History   Problem Relation Age of Onset    Breast cancer Paternal Aunt     Breast cancer Paternal Aunt        Review of Systems  Review of Systems   All other systems reviewed and are negative.      Objective   /62   Pulse 80   Temp 98.2 °F (36.8 °C)   Ht 170.2 cm (67.01\")   Wt 78.5 kg (173 lb)   SpO2 98%   BMI 27.09 kg/m²   Body mass index is 27.09 kg/m².    Physical Exam  Constitutional:       Appearance: She is well-developed. She is not ill-appearing.   HENT:      Head: Normocephalic.      Right Ear: Tympanic membrane, ear canal and external ear normal.      Left Ear: Tympanic membrane, ear canal and external ear normal.      Nose: Nose normal.      Mouth/Throat:      Mouth: Mucous membranes are moist.      Pharynx: Oropharynx is clear. Uvula midline.   Eyes:      Extraocular Movements: Extraocular movements intact.      Conjunctiva/sclera: Conjunctivae normal.      Pupils: Pupils are equal, round, and reactive to light.   Neck:      Thyroid: No thyromegaly.   Cardiovascular:      Rate and Rhythm: Normal rate and regular rhythm.      Pulses:    "        Radial pulses are 2+ on the right side and 2+ on the left side.        Dorsalis pedis pulses are 2+ on the right side and 2+ on the left side.      Heart sounds: Normal heart sounds.   Pulmonary:      Effort: Pulmonary effort is normal.      Breath sounds: Normal breath sounds.   Abdominal:      General: Bowel sounds are normal.      Palpations: Abdomen is soft.      Tenderness: There is no abdominal tenderness.   Musculoskeletal:         General: No tenderness or deformity. Normal range of motion.      Cervical back: Full passive range of motion without pain, normal range of motion and neck supple.      Right lower leg: No edema.      Left lower leg: No edema.   Lymphadenopathy:      Cervical: No cervical adenopathy.   Skin:     General: Skin is warm.      Capillary Refill: Capillary refill takes less than 2 seconds.   Neurological:      Mental Status: She is alert and oriented to person, place, and time.      Sensory: No sensory deficit.      Coordination: Coordination normal.      Gait: Gait normal.      Comments: CN II-XII normal   Psychiatric:         Attention and Perception: Attention normal.         Mood and Affect: Mood and affect normal.         Speech: Speech normal.         Behavior: Behavior normal.         Thought Content: Thought content normal.         The 10-year CVD risk score (D'Agostino, et al., 2008) is: 1%    Values used to calculate the score:      Age: 34 years      Sex: Female      Diabetic: No      Tobacco smoker: No      Systolic Blood Pressure: 112 mmHg      Is BP treated: No      HDL Cholesterol: 70 mg/dL      Total Cholesterol: 203 mg/dL      Assessment & Plan   1. Healthy female exam.    2. Patient Counseling: Including but not Limited to the following, when appropriate:  --Nutrition: Stressed importance of moderation in sodium/caffeine intake, saturated fat and cholesterol, caloric balance, sufficient intake of fresh fruits, vegetables, fiber, calcium, iron, and 1 mg of folate  supplement per day (for females capable of pregnancy).  --Exercise: Stressed the importance of regular exercise.   --Substance Abuse: Discussed cessation/primary prevention of tobacco, alcohol, or other drug use; driving or other dangerous activities under the influence; availability of treatment for abuse, as indicated based on social history.    --Sexuality: Discussed sexually transmitted diseases, partner selection, use of condoms, avoidance of unintended pregnancy  and contraceptive alternatives.   --Injury prevention: Discussed safety belts, safety helmets, smoke detector, smoking near bedding or upholstery.   --Dental health: Discussed importance of regular tooth brushing, flossing, and dental visits.  --Immunizations reviewed.  --Discussed benefits of colon cancer screening.  3. Discussed the patient's BMI with her.  The BMI is above average; BMI management plan is completed  4. Return in about 1 year (around 12/23/2025) for Annual.  5. Age-appropriate Screening Scheduled    Assessment & Plan     Diagnoses and all orders for this visit:    1. Encounter to establish care (Primary)    2. Encounter for annual physical examination excluding gynecological examination in a patient older than 17 years    3. Screening for lipid disorders  -     Lipid Panel    4. Screening for disorder of blood and blood-forming organs  -     Vitamin B12  -     Folate    5. Chronic deep vein thrombosis (DVT) of right iliac vein  6. Current use of long term anticoagulation       - Continue xarelto.

## 2025-01-08 ENCOUNTER — TELEMEDICINE (OUTPATIENT)
Dept: NEUROLOGY | Facility: CLINIC | Age: 35
End: 2025-01-08
Payer: COMMERCIAL

## 2025-01-08 DIAGNOSIS — G43.109 MIGRAINE WITH AURA AND WITHOUT STATUS MIGRAINOSUS, NOT INTRACTABLE: Primary | ICD-10-CM

## 2025-01-08 NOTE — PROGRESS NOTES
"     Follow Up Office Visit      Patient Name: Christine Black  : 1990   MRN: [id number]     Chief Complaint:  migraines    History of Present Illness: Christine Black is a 35 y.o. female who is here today to follow up with migraine headaches via telehealth audio and video. She is doing well on Nurtec prn. Reports she has not had a migraine since Oct. But this was a more complex migraine where she says it messed with her speech and made things \"sound like I was not saying them right\". This migraine lasted 4-5 hours and she did not feel like the Nurtec was effective for it.  She has throbbing pain generally around the temporal area on the left. She has visual disturbances along with photophobia and phonophobia and nausea. She had a normal eye exam in November.         History of Present Illness has been carried forward from her 2024 office visit as follows: Christine Black is a 34 y.o. female who is here today to follow-up with neurology on migraine headaches.  At last office visit she was started on Nurtec as needed.  This is working well for her.  It will abort her migraine within 20 to 30 minutes.  She denies any changes in her migraines. She still has an aura that causes visual disturbances and blurred vision.  They are still located above her left eye with associated photophobia, phonophobia and nausea.  She describes her pain as a pulsating pain      She still is over due for her eye exam and will call to get this scheduled.         Pertinent Medical History: DVT, PE , followed by Hematology for elevated H&H she is on blood thinners  - She has an IUD in place for birth control.    Subjective      Review of Systems:   Review of Systems   Eyes:  Positive for photophobia and visual disturbance.   Neurological:  Positive for headache.       I have reviewed and the following portions of the patient's history were updated as appropriate: past family history, past medical history, past social history, " past surgical history and problem list.    Medications:     Current Outpatient Medications:     rimegepant sulfate (Nurtec) 75 MG tablet, Take 1 tablet by mouth Daily As Needed (migraine)., Disp: 8 tablet, Rfl: 6    clindamycin 1 % gel, Apply 1 Application topically to the appropriate area as directed 2 (Two) Times a Day., Disp: , Rfl:     Dapsone 5 % topical gel, Apply  topically to the appropriate area as directed 2 (Two) Times a Day., Disp: , Rfl:     Levonorgestrel (MIRENA, 52 MG, IU), by Intrauterine route., Disp: , Rfl:     spironolactone (ALDACTONE) 100 MG tablet, TAKE 1 AND 1/2 TABLET BY MOUTH DAILY, Disp: 135 tablet, Rfl: 3    Trifarotene (Aklief) 0.005 % cream, Apply  topically., Disp: , Rfl:     Xarelto 20 MG tablet, TAKE ONE TABLET BY MOUTH DAILY, Disp: 90 tablet, Rfl: 3    Allergies:   No Known Allergies    Objective     Physical Exam:  Vital Signs: There were no vitals filed for this visit.  There is no height or weight on file to calculate BMI.    Physical Exam  Constitutional:       Appearance: Normal appearance.   HENT:      Head: Normocephalic.   Pulmonary:      Effort: Pulmonary effort is normal.   Neurological:      General: No focal deficit present.      Mental Status: She is alert and oriented to person, place, and time.      Cranial Nerves: No dysarthria.      Comments: This was a telehealth video visit.  Patient is able to communicate easily and effectively.  No gross/obvious cranial nerve deficits noted.    Psychiatric:         Attention and Perception: Attention normal.         Mood and Affect: Mood and affect normal.         Speech: Speech normal.         Behavior: Behavior normal. Behavior is cooperative.         Thought Content: Thought content normal.         Cognition and Memory: Cognition normal.         Judgment: Judgment normal.         Neurological Exam  Mental Status  Alert. Oriented to person, place, time and situation. Oriented to person, place, and time. Recent and remote  memory are intact. Speech is normal. no dysarthria present. Language is fluent with no aphasia. Attention and concentration are normal.      Assessment / Plan      Assessment/Plan:   Diagnoses and all orders for this visit:    1. Migraine with aura and without status migrainosus, not intractable (Primary)  -     rimegepant sulfate (Nurtec) 75 MG tablet; Take 1 tablet by mouth Daily As Needed (migraine).  Dispense: 8 tablet; Refill: 6         Medication refilled without change.  Indications and side effects discussed with patient she verbalizes understanding.  Patient will call in the interim if she has any questions or concerns or changes.  We discussed trying sample of Zavzpret should she have more complex migraine that is harder to get rid of as this may be more efficacious for her than Nurtec and we could then change her Rx if needed.    This was an audio and video enabled telemedicine encounter.     Follow Up:   Return in about 6 months (around 7/8/2025).    MAVERICK Roberts, FNP-BC  [facility] Neurology and Sleep Medicine

## 2025-01-27 ENCOUNTER — LAB (OUTPATIENT)
Dept: LAB | Facility: HOSPITAL | Age: 35
End: 2025-01-27
Payer: COMMERCIAL

## 2025-01-27 ENCOUNTER — HOSPITAL ENCOUNTER (OUTPATIENT)
Dept: ULTRASOUND IMAGING | Facility: HOSPITAL | Age: 35
Discharge: HOME | End: 2025-01-27
Payer: COMMERCIAL

## 2025-01-27 ENCOUNTER — OFFICE VISIT (OUTPATIENT)
Dept: ONCOLOGY | Facility: CLINIC | Age: 35
End: 2025-01-27
Payer: COMMERCIAL

## 2025-01-27 VITALS
RESPIRATION RATE: 16 BRPM | HEIGHT: 67 IN | DIASTOLIC BLOOD PRESSURE: 68 MMHG | HEART RATE: 94 BPM | OXYGEN SATURATION: 99 % | TEMPERATURE: 97.7 F | WEIGHT: 174 LBS | BODY MASS INDEX: 27.31 KG/M2 | SYSTOLIC BLOOD PRESSURE: 135 MMHG

## 2025-01-27 DIAGNOSIS — I26.99 PULMONARY EMBOLISM WITHOUT ACUTE COR PULMONALE, UNSPECIFIED CHRONICITY, UNSPECIFIED PULMONARY EMBOLISM TYPE: ICD-10-CM

## 2025-01-27 DIAGNOSIS — I26.99 PULMONARY EMBOLISM WITHOUT ACUTE COR PULMONALE, UNSPECIFIED CHRONICITY, UNSPECIFIED PULMONARY EMBOLISM TYPE: Primary | ICD-10-CM

## 2025-01-27 DIAGNOSIS — D75.1 POLYCYTHEMIA: ICD-10-CM

## 2025-01-27 DIAGNOSIS — I83.91 VARICOSE VEINS OF RIGHT LOWER EXTREMITY, UNSPECIFIED WHETHER COMPLICATED: ICD-10-CM

## 2025-01-27 DIAGNOSIS — Z95.820 STATUS POST ANGIOPLASTY WITH STENT: ICD-10-CM

## 2025-01-27 DIAGNOSIS — I82.411 DEEP VENOUS THROMBOSIS OF RIGHT PROFUNDA FEMORIS VEIN: ICD-10-CM

## 2025-01-27 DIAGNOSIS — M79.89 RIGHT LEG SWELLING: ICD-10-CM

## 2025-01-27 LAB
ALBUMIN SERPL-MCNC: 4.3 G/DL (ref 3.5–5.2)
ALBUMIN/GLOB SERPL: 1.4 G/DL
ALP SERPL-CCNC: 56 U/L (ref 39–117)
ALT SERPL W P-5'-P-CCNC: 15 U/L (ref 1–33)
ANION GAP SERPL CALCULATED.3IONS-SCNC: 10.6 MMOL/L (ref 5–15)
AST SERPL-CCNC: 54 U/L (ref 1–32)
BASOPHILS # BLD AUTO: 0.04 10*3/MM3 (ref 0–0.2)
BASOPHILS NFR BLD AUTO: 0.6 % (ref 0–1.5)
BILIRUB CONJ SERPL-MCNC: 0.3 MG/DL (ref 0–0.3)
BILIRUB SERPL-MCNC: 1.1 MG/DL (ref 0–1.2)
BUN SERPL-MCNC: 23 MG/DL (ref 6–20)
BUN/CREAT SERPL: 28.4 (ref 7–25)
CALCIUM SPEC-SCNC: 9.3 MG/DL (ref 8.6–10.5)
CHLORIDE SERPL-SCNC: 102 MMOL/L (ref 98–107)
CHOLEST SERPL-MCNC: 157 MG/DL (ref 0–200)
CO2 SERPL-SCNC: 25.4 MMOL/L (ref 22–29)
CREAT SERPL-MCNC: 0.81 MG/DL (ref 0.57–1)
DEPRECATED RDW RBC AUTO: 46.1 FL (ref 37–54)
EGFRCR SERPLBLD CKD-EPI 2021: 98.4 ML/MIN/1.73
EOSINOPHIL # BLD AUTO: 0.05 10*3/MM3 (ref 0–0.4)
EOSINOPHIL NFR BLD AUTO: 0.7 % (ref 0.3–6.2)
ERYTHROCYTE [DISTWIDTH] IN BLOOD BY AUTOMATED COUNT: 13.2 % (ref 12.3–15.4)
FOLATE SERPL-MCNC: 11.6 NG/ML (ref 4.78–24.2)
GLOBULIN UR ELPH-MCNC: 3.1 GM/DL
GLUCOSE SERPL-MCNC: 84 MG/DL (ref 65–99)
HCT VFR BLD AUTO: 50.3 % (ref 34–46.6)
HDLC SERPL-MCNC: 61 MG/DL (ref 40–60)
HGB BLD-MCNC: 17.2 G/DL (ref 12–15.9)
IMM GRANULOCYTES # BLD AUTO: 0.02 10*3/MM3 (ref 0–0.05)
IMM GRANULOCYTES NFR BLD AUTO: 0.3 % (ref 0–0.5)
LDLC SERPL CALC-MCNC: 83 MG/DL (ref 0–100)
LDLC/HDLC SERPL: 1.37 {RATIO}
LYMPHOCYTES # BLD AUTO: 1.36 10*3/MM3 (ref 0.7–3.1)
LYMPHOCYTES NFR BLD AUTO: 20.1 % (ref 19.6–45.3)
MCH RBC QN AUTO: 32.2 PG (ref 26.6–33)
MCHC RBC AUTO-ENTMCNC: 34.2 G/DL (ref 31.5–35.7)
MCV RBC AUTO: 94.2 FL (ref 79–97)
MONOCYTES # BLD AUTO: 0.41 10*3/MM3 (ref 0.1–0.9)
MONOCYTES NFR BLD AUTO: 6.1 % (ref 5–12)
NEUTROPHILS NFR BLD AUTO: 4.89 10*3/MM3 (ref 1.7–7)
NEUTROPHILS NFR BLD AUTO: 72.2 % (ref 42.7–76)
NRBC BLD AUTO-RTO: 0 /100 WBC (ref 0–0.2)
PLATELET # BLD AUTO: 229 10*3/MM3 (ref 140–450)
PMV BLD AUTO: 9.5 FL (ref 6–12)
POTASSIUM SERPL-SCNC: 3.9 MMOL/L (ref 3.5–5.2)
PROT SERPL-MCNC: 7.4 G/DL (ref 6–8.5)
RBC # BLD AUTO: 5.34 10*6/MM3 (ref 3.77–5.28)
SODIUM SERPL-SCNC: 138 MMOL/L (ref 136–145)
TRIGL SERPL-MCNC: 63 MG/DL (ref 0–150)
VIT B12 BLD-MCNC: 1008 PG/ML (ref 211–946)
VLDLC SERPL-MCNC: 13 MG/DL (ref 5–40)
WBC NRBC COR # BLD AUTO: 6.77 10*3/MM3 (ref 3.4–10.8)

## 2025-01-27 PROCEDURE — 82607 VITAMIN B-12: CPT | Performed by: NURSE PRACTITIONER

## 2025-01-27 PROCEDURE — 82746 ASSAY OF FOLIC ACID SERUM: CPT | Performed by: NURSE PRACTITIONER

## 2025-01-27 PROCEDURE — 99214 OFFICE O/P EST MOD 30 MIN: CPT | Performed by: INTERNAL MEDICINE

## 2025-01-27 PROCEDURE — 80061 LIPID PANEL: CPT | Performed by: NURSE PRACTITIONER

## 2025-01-27 PROCEDURE — 93971 EXTREMITY STUDY: CPT

## 2025-01-27 PROCEDURE — 82248 BILIRUBIN DIRECT: CPT

## 2025-01-27 PROCEDURE — 80053 COMPREHEN METABOLIC PANEL: CPT

## 2025-01-27 PROCEDURE — 85025 COMPLETE CBC W/AUTO DIFF WBC: CPT

## 2025-01-27 NOTE — PROGRESS NOTES
Hematology and Oncology [place]  Office number [phone]    Fax number [phone]    Follow up     Date: 25    Patient Name: Christine Black  MRN: [id number]  : 1990    Primary care: Erika Curiel NP    Chief Complaint: VTE, polycythemia    History of Present Illness: Christine Black is a pleasant 35 y.o. female who presents today for evaluation of VTE.    In 2020 she presented with buttock pain and shoulder pain and was diagnosed with a DVT and PE.  She underwent thrombectomy with stent placement.  She had been recently initiated on OCPs in January of that year.  She had no preceding history of high risk travel or surgery.  She was initiated on Xarelto which she continues with good tolerance.  Her menses were heavy with a ParaGard, but are light since she had a Mirena placed in 2023.    She had a limited hypercoagulable panel obtained at the time of her presentationThis was notable for Antithrombin 3 levels which were normal; normal homocystine levels.  Protein C&S levels were normal.  Factor V Leiden gene mutation was negative.    Recently she was noted to have polycythemia with a hemoglobin of 18.  Notably, she had only had coffee on the morning of her blood draw.  Review of serial CBCs dating back to  show high normal hemoglobin on prior checks.    Notably she  has a family history of a provoked blood clot in her father at the age of 30 following a hip fracture with repair.  He also experienced a blood clot in his 60s with travel.  She also has a family history of early onset breast cancer in her paternal aunt at age 49.  No prior cancer genetic testing that she is aware of.    She provides additional family history that her dad was told he was polycythemic but this was ultimately determined to be due to living in [place].  He has not required phlebotomy.  Her grandmother, mother, and sister all have elevated liver function tests.  She is not aware of any specific  diagnosis.    Bone marrow biopsy from 12/20/2023 shows normocellular bone marrow for age with adequate trilineage hematopoiesis and no evidence of dysplasia, fibrosis, increased blasts, or and lymphoma.  Cytogenetics negative.    She has h/o mechanical thrombectomy with subsequent angioplasty and then stenting of the right iliac veins as well as the common femoral vein 3/11/2020. Dr. Yan (now retired)    Interval history:  She is here for follow up.   Has had some worsening varicosities with mild discomfort in the right lower extremity which is the side of her prior DVT. No calf pain, no persistent swelling. Declines compression garments. No bleeding symptoms. Migraines have been better on Nurtec PRN.   Continues Xarelto, no abnormal bleeding.   No CP, SOA.  No heavy menses on Mirena.    Past Medical History:   Past Medical History:   Diagnosis Date    Clotting disorder 3/7/2020    Blood clot in leg and lung. Removal of blood clot in leg    Cluster headache     Deep vein thrombosis 3/7/2020    Family history of blood clots     Headache, tension-type     History of blood clots     Migraine     Near syncope 7/24/2024    Ovarian cyst     Pulmonary embolism 03/07/2020    Blood clots in right leg and lungs    Vision loss     When migraines hit       Past Surgical History:   Past Surgical History:   Procedure Laterality Date    CARDIAC CATHETERIZATION Right 03/09/2020    Procedure: Peripheral angiography;  Surgeon: Margarito Yan MD;  Location: [facility] CATH INVASIVE LOCATION;  Service: Cardiovascular;  Laterality: Right;  Prone/ R. Popliteal Access    OTHER SURGICAL HISTORY      Blood clot removal- Right Leg      VASCULAR SURGERY      Blood clot removal   Has right iliac/femoral stent 2020    Family History:   Family History   Problem Relation Age of Onset    Breast cancer Paternal Aunt     Breast cancer Paternal Aunt        Social History:   Social History     Socioeconomic History    Marital status:  "Single   Tobacco Use    Smoking status: Never    Smokeless tobacco: Never   Vaping Use    Vaping status: Never Used   Substance and Sexual Activity    Alcohol use: Yes     Alcohol/week: 1.0 standard drink of alcohol    Drug use: Never    Sexual activity: Yes     Partners: Male     Birth control/protection: I.U.D.       Medications:     Current Outpatient Medications:     clindamycin 1 % gel, Apply 1 Application topically to the appropriate area as directed 2 (Two) Times a Day., Disp: , Rfl:     Dapsone 5 % topical gel, Apply  topically to the appropriate area as directed 2 (Two) Times a Day., Disp: , Rfl:     Levonorgestrel (MIRENA, 52 MG, IU), by Intrauterine route., Disp: , Rfl:     rimegepant sulfate (Nurtec) 75 MG tablet, Take 1 tablet by mouth Daily As Needed (migraine)., Disp: 8 tablet, Rfl: 6    spironolactone (ALDACTONE) 100 MG tablet, TAKE 1 AND 1/2 TABLET BY MOUTH DAILY, Disp: 135 tablet, Rfl: 3    Trifarotene (Aklief) 0.005 % cream, Apply  topically., Disp: , Rfl:     Xarelto 20 MG tablet, TAKE ONE TABLET BY MOUTH DAILY, Disp: 90 tablet, Rfl: 3    Allergies:   No Known Allergies    Objective     Vital Signs:   Vitals:    01/27/25 0917   BP: 135/68   Pulse: 94   Resp: 16   Temp: 97.7 °F (36.5 °C)   SpO2: 99%   Weight: 78.9 kg (174 lb)   Height: 170.2 cm (67.01\")   PainSc: 0-No pain    Body mass index is 27.25 kg/m².   Pain Score    01/27/25 0917   PainSc: 0-No pain       Physical Exam:   General: No acute distress. Well appearing   HEENT: Normocephalic, atraumatic. Sclera anicteric.   Neck: supple, no adenopathy.   Cardiovascular: regular rate and rhythm. No murmurs.   Respiratory: Normal rate. Clear to auscultation bilaterally.  Abdomen: Soft, nontender, non distended with normoactive bowel sounds.   Lymph: no cervical, supraclavicular or axillary adenopathy.  Neuro: Alert and oriented x 3. No focal deficits.   Ext: Symmetric, 0-trace swelling on right. Circumference similar bilateral calves. She is in " tights so skin was obscured   Accurate 1/27/25    Laboratory/Imaging Reviewed:                 Assessment / Plan      Assessment/Plan:     1.  Pulmonary embolism, provoked in the context of oral contraceptives  2.  Family history of recurrent VTE in her father  -She presented with a provoked VTE event in the context of oral contraceptive use.  Workup for primary causes of thrombophilia included negative factor V Leiden mutation, normal levels of protein C and protein S.  Prothrombin gene mutation was negative.  Antithrombin levels were normal.  Anticardiolipin antibodies and beta-2 glycoprotein antibodies are negative.  Despite no evidence of thrombophilia, she does have persistent polycythemia which may increase her risk for further thrombosis.  -Because of her family history she has been continued on indefinite anticoagulation prior to consultation.  Despite no evidence of thrombophilia and unprovoked cause, I recommended continuation of anticoagulation at least until etiology of her polycythemia can be determined. She has now undergone extensive workup as below and appears to have a high affinity hemoglobin as the cause of her polycythemia.  We discussed that management of high affinity hemoglobin as it relates to primary prevention of thrombosis is not clear but given that she has a history of VTE associated with polycythemia, I would err on the side of continuing indefinite anticoagulation.  She is in agreement with this plan.  -Continue Xarelto daily.  -Check labs today:   Orders Placed This Encounter   Procedures    US Venous Doppler Lower Extremity Right (duplex)    Comprehensive Metabolic Panel    Bilirubin, Direct    Ambulatory Referral to General Surgery    CBC & Differential     3.  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    -Negative JAK2, VANESSA-R, MPL, and exon 12 mutations.    -Erythropoietin is normal at 10.7.  -BCR-ABL was ordered but not collected  but this is unlikely to be of high yield in context of negative bone marrow biopsy.  -She has no history of cardiopulmonary disease.  -Bone marrow biopsy is normal and does not explain her persistent polycythemia.  -P50 testing performed at [facility] January 2024 notable for a low P50 of 17 (LLN 22) which is consistent with increased oxygen affinity of hemoglobin.   -We reviewed her [facility] laboratories testing which is negative for VHL related erythrocytosis and related conditions.  We discussed referral to genetic counseling or an academic center.  However there is an autosomal dominant pattern, and reduced P50 are highly suggestive of high affinity hemoglobin and I would recommend managing her as such.  -CBC pending    4. Abnormal LFTs  -Liver US was normal in 2022. Labs show normal iron saturation/ferritin/no evidence for iron overload.  -CMP shows improvement in AST which still remains slightly above the normal range in January 2024  -CMP pending    6.  Presence of vascular stents  7.  Worsening Varicose veins  -She will remain on chronic anticoagulation.   -Repeat duplex, but symptoms are not suggestive of acute DVT  -Referral to vascular surgery given worsening venous insufficiency symptoms, presence of vascular stent. Her prior vascular surgeon retired.   -Recommend compression socks, declines    Follow Up:   6 mo     Reshma Baig MD  Hematology and Oncology

## 2025-02-04 NOTE — TELEPHONE ENCOUNTER
Caller: Christine Black    Relationship: Self    Best call back number: [phone]     Requested Prescriptions:   Requested Prescriptions     Pending Prescriptions Disp Refills    rivaroxaban (Xarelto) 20 MG tablet 90 tablet 3     Sig: Take 1 tablet by mouth Daily.        Pharmacy where request should be sent: [facility] [id number] [address] AT [facility] [phone] [facility] [phone] FX     Last office visit with prescribing clinician: 12/23/2024   Last telemedicine visit with prescribing clinician: Visit date not found   Next office visit with prescribing clinician: 12/26/2025     Additional details provided by patient:     Does the patient have less than a 3 day supply:  [x] Yes  [] No    Would you like a call back once the refill request has been completed: [] Yes [x] No    If the office needs to give you a call back, can they leave a voicemail: [] Yes [x] No    Missael Blake Rep   02/04/25 08:35 EST

## 2025-02-24 ENCOUNTER — TELEPHONE (OUTPATIENT)
Dept: ONCOLOGY | Facility: CLINIC | Age: 35
End: 2025-02-24

## 2025-02-24 NOTE — TELEPHONE ENCOUNTER
Caller: Marjorie Black    Relationship: Self    Best call back number: [phone]    What is the best time to reach you: ANY    Who are you requesting to speak with (clinical staff, provider,  specific staff member): CLINICAL       What was the call regarding: MARJORIE HAS NEVER RECEIVED A CALL TO GET SCHEDULED FOR REFERRAL FOR VASCULAR SURGERY    I CALLED DR CALDERÓN'S OFFICE AND THEY STATED THEY NEVER RECEIVED A REFERRAL    THEY ARE ASKING FOR A REFERRAL TO BE FAXED [phone]      PLEASE ADVISE

## 2025-04-24 RX ORDER — SPIRONOLACTONE 100 MG/1
150 TABLET, FILM COATED ORAL DAILY
Qty: 135 TABLET | Refills: 3 | Status: SHIPPED | OUTPATIENT
Start: 2025-04-24

## 2025-07-08 ENCOUNTER — OFFICE VISIT (OUTPATIENT)
Dept: NEUROLOGY | Facility: CLINIC | Age: 35
End: 2025-07-08
Payer: COMMERCIAL

## 2025-07-08 VITALS
OXYGEN SATURATION: 99 % | HEART RATE: 70 BPM | TEMPERATURE: 97.5 F | HEIGHT: 67 IN | WEIGHT: 173 LBS | SYSTOLIC BLOOD PRESSURE: 110 MMHG | DIASTOLIC BLOOD PRESSURE: 68 MMHG | BODY MASS INDEX: 27.15 KG/M2

## 2025-07-08 DIAGNOSIS — G43.109 MIGRAINE WITH AURA AND WITHOUT STATUS MIGRAINOSUS, NOT INTRACTABLE: ICD-10-CM

## 2025-07-08 RX ORDER — CLASCOTERONE 1 G/100G
CREAM TOPICAL
COMMUNITY
Start: 2025-06-20

## 2025-07-08 NOTE — PROGRESS NOTES
"     Follow Up Office Visit      Patient Name: Christine Black  : 1990   MRN: [id number]     Chief Complaint:    Chief Complaint   Patient presents with    Follow-up     Migraine       History of Present Illness: Christine Black is a 35 y.o. female who is here today to follow up with migraine headaches.  She continues to do well and says she cannot remember the last time she had a migraine.  Nurtec is generally effective with aborting her migraines.  She still has throbbing pain that is generally in the left temporal with associated photophobia, phonophobia and nausea.  Her last eye exam was in November.  She says that she feels when she awakens up in the morning that her left pupil is slightly smaller than the right.  She will get a new eye exam.    History of Present Illness: Christine Black is a 35 y.o. female who is here today to follow up with migraine headaches via telehealth audio and video. She is doing well on Nurtec prn. Reports she has not had a migraine since Oct. But this was a more complex migraine where she says it messed with her speech and made things \"sound like I was not saying them right\". This migraine lasted 4-5 hours and she did not feel like the Nurtec was effective for it.  She has throbbing pain generally around the temporal area on the left. She has visual disturbances along with photophobia and phonophobia and nausea. She had a normal eye exam in November.         History of Present Illness has been carried forward from her 2024 office visit as follows: Christine Black is a 34 y.o. female who is here today to follow-up with neurology on migraine headaches.  At last office visit she was started on Nurtec as needed.  This is working well for her.  It will abort her migraine within 20 to 30 minutes.  She denies any changes in her migraines. She still has an aura that causes visual disturbances and blurred vision.  They are still located above her left eye with associated photophobia, " "phonophobia and nausea.  She describes her pain as a pulsating pain      She still is over due for her eye exam and will call to get this scheduled.         Pertinent Medical History: DVT, PE 2020, followed by Hematology for elevated H&H she is on blood thinners  - She has an IUD in place for birth control.    Subjective      Review of Systems:   Review of Systems   Eyes:  Positive for photophobia.   Neurological:  Positive for headache.       I have reviewed and the following portions of the patient's history were updated as appropriate: past family history, past medical history, past social history, past surgical history and problem list.    Medications:     Current Outpatient Medications:     Levonorgestrel (MIRENA, 52 MG, IU), by Intrauterine route., Disp: , Rfl:     rimegepant sulfate ODT (Nurtec) 75 MG disintegrating tablet, Place 1 tablet under the tongue Daily As Needed (migraine)., Disp: 8 tablet, Rfl: 11    rivaroxaban (Xarelto) 20 MG tablet, Take 1 tablet by mouth Daily., Disp: 90 tablet, Rfl: 3    Trifarotene (Aklief) 0.005 % cream, Apply  topically., Disp: , Rfl:     Winlevi 1 % cream, , Disp: , Rfl:     Allergies:   No Known Allergies    Objective     Physical Exam:  Vital Signs:   Vitals:    07/08/25 1019   BP: 110/68   Pulse: 70   Temp: 97.5 °F (36.4 °C)   SpO2: 99%   Weight: 78.5 kg (173 lb)   Height: 170.2 cm (67\")   PainSc: 0-No pain     Body mass index is 27.1 kg/m².    Physical Exam  Constitutional:       Appearance: Normal appearance.   HENT:      Head: Normocephalic.   Eyes:      General: Lids are normal.      Extraocular Movements: Extraocular movements intact.      Conjunctiva/sclera: Conjunctivae normal.      Pupils: Pupils are equal, round, and reactive to light.   Pulmonary:      Effort: Pulmonary effort is normal.   Musculoskeletal:         General: Normal range of motion.   Skin:     General: Skin is warm and dry.   Neurological:      General: No focal deficit present.      Mental " Status: She is alert and oriented to person, place, and time.      Cranial Nerves: Cranial nerves 2-12 are intact. No dysarthria.      Motor: Motor function is intact. No tremor or pronator drift.      Coordination: Romberg sign negative. Finger-Nose-Finger Test normal.      Deep Tendon Reflexes:      Reflex Scores:       Patellar reflexes are 2+ on the left side.  Psychiatric:         Attention and Perception: Attention normal.         Mood and Affect: Mood and affect normal.         Speech: Speech normal.         Behavior: Behavior normal. Behavior is cooperative.         Thought Content: Thought content normal.         Cognition and Memory: Cognition normal.         Judgment: Judgment normal.         Neurological Exam  Mental Status  Alert. Oriented to person, place, time and situation. Oriented to person, place, and time. Recent and remote memory are intact. Speech is normal. no dysarthria present. Language is fluent with no aphasia.    Cranial Nerves  CN III, IV, VI: Extraocular movements intact bilaterally. Normal lids and orbits bilaterally. Pupils equal round and reactive to light bilaterally. Bilateral pupil size 4 mm in dim light and 2 mm in bright light.  There was no variation noted between the 2..  CN V: Facial sensation is normal.  CN VII: Full and symmetric facial movement.  CN IX, X: Palate elevates symmetrically  CN XI: Shoulder shrug strength is normal.  CN XII: Tongue midline without atrophy or fasciculations.    Motor  Normal muscle bulk throughout. No fasciculations present. Normal muscle tone.                                               Right                     Left  Deltoid                                   5                          5   Biceps                                   5                            Triceps                                  5                          5   Quadriceps                           5                          5   Anterior tibialis                      5                           5   Posterior tibialis                    5                          5   Peroneal                               5                          5  Ankle dorsiflexor                   5                          5  Ankle plantar flexor              5                           5    Sensory  Sensation is intact to light touch, pinprick, vibration and proprioception in all four extremities.    Reflexes                                            Right                      Left  Patellar                                                        2+    Coordination  No tremorRight: Finger-to-nose normal. Rapid alternating movement normal.    Gait  Casual gait is normal including stance, stride, and arm swing.Normal toe walking. Normal heel walking. Normal tandem gait. Romberg is absent.      Assessment / Plan      Assessment/Plan:   Diagnoses and all orders for this visit:    1. Migraine with aura and without status migrainosus, not intractable  -     rimegepant sulfate ODT (Nurtec) 75 MG disintegrating tablet; Place 1 tablet under the tongue Daily As Needed (migraine).  Dispense: 8 tablet; Refill: 11         Neuroexam today was normal.  No deficits noted between pupil size, movement, no nystagmus.  She denies any blurred vision, double vision or tunnel vision.  She will get a comprehensive eye exam in the interim.  Nurtec refills given without change. Indications and side effects discussed with patient she verbalizes understanding.  Patient will call in the interim if she has any questions or concerns or changes.    Follow Up:   Return in about 1 year (around 7/8/2026).    MAVERICK Roberts, FNP-[facility] Neurology and Sleep Medicine

## 2025-07-28 ENCOUNTER — OFFICE VISIT (OUTPATIENT)
Dept: ONCOLOGY | Facility: CLINIC | Age: 35
End: 2025-07-28
Payer: COMMERCIAL

## 2025-07-28 ENCOUNTER — LAB (OUTPATIENT)
Dept: LAB | Facility: HOSPITAL | Age: 35
End: 2025-07-28
Payer: COMMERCIAL

## 2025-07-28 VITALS
HEART RATE: 78 BPM | HEIGHT: 67 IN | RESPIRATION RATE: 16 BRPM | TEMPERATURE: 98.4 F | WEIGHT: 169 LBS | DIASTOLIC BLOOD PRESSURE: 65 MMHG | SYSTOLIC BLOOD PRESSURE: 123 MMHG | BODY MASS INDEX: 26.53 KG/M2 | OXYGEN SATURATION: 99 %

## 2025-07-28 DIAGNOSIS — D75.1 POLYCYTHEMIA: Primary | ICD-10-CM

## 2025-07-28 DIAGNOSIS — I26.99 PULMONARY EMBOLISM WITHOUT ACUTE COR PULMONALE, UNSPECIFIED CHRONICITY, UNSPECIFIED PULMONARY EMBOLISM TYPE: ICD-10-CM

## 2025-07-28 LAB
ALBUMIN SERPL-MCNC: 4.2 G/DL (ref 3.5–5.2)
ALBUMIN/GLOB SERPL: 1.6 G/DL
ALP SERPL-CCNC: 64 U/L (ref 39–117)
ALT SERPL W P-5'-P-CCNC: 22 U/L (ref 1–33)
ANION GAP SERPL CALCULATED.3IONS-SCNC: 9.2 MMOL/L (ref 5–15)
AST SERPL-CCNC: 75 U/L (ref 1–32)
BASOPHILS # BLD AUTO: 0.05 10*3/MM3 (ref 0–0.2)
BASOPHILS NFR BLD AUTO: 0.7 % (ref 0–1.5)
BILIRUB SERPL-MCNC: 1.1 MG/DL (ref 0–1.2)
BUN SERPL-MCNC: 20 MG/DL (ref 6–20)
BUN/CREAT SERPL: 17.4 (ref 7–25)
CALCIUM SPEC-SCNC: 9.3 MG/DL (ref 8.6–10.5)
CHLORIDE SERPL-SCNC: 105 MMOL/L (ref 98–107)
CO2 SERPL-SCNC: 23.8 MMOL/L (ref 22–29)
CREAT SERPL-MCNC: 1.15 MG/DL (ref 0.57–1)
DEPRECATED RDW RBC AUTO: 46.1 FL (ref 37–54)
EGFRCR SERPLBLD CKD-EPI 2021: 64.6 ML/MIN/1.73
EOSINOPHIL # BLD AUTO: 0.03 10*3/MM3 (ref 0–0.4)
EOSINOPHIL NFR BLD AUTO: 0.4 % (ref 0.3–6.2)
ERYTHROCYTE [DISTWIDTH] IN BLOOD BY AUTOMATED COUNT: 13.3 % (ref 12.3–15.4)
GLOBULIN UR ELPH-MCNC: 2.7 GM/DL
GLUCOSE SERPL-MCNC: 83 MG/DL (ref 65–99)
HCT VFR BLD AUTO: 49.1 % (ref 34–46.6)
HGB BLD-MCNC: 16.8 G/DL (ref 12–15.9)
IMM GRANULOCYTES # BLD AUTO: 0.02 10*3/MM3 (ref 0–0.05)
IMM GRANULOCYTES NFR BLD AUTO: 0.3 % (ref 0–0.5)
LYMPHOCYTES # BLD AUTO: 1.64 10*3/MM3 (ref 0.7–3.1)
LYMPHOCYTES NFR BLD AUTO: 23 % (ref 19.6–45.3)
MCH RBC QN AUTO: 32 PG (ref 26.6–33)
MCHC RBC AUTO-ENTMCNC: 34.2 G/DL (ref 31.5–35.7)
MCV RBC AUTO: 93.5 FL (ref 79–97)
MONOCYTES # BLD AUTO: 0.48 10*3/MM3 (ref 0.1–0.9)
MONOCYTES NFR BLD AUTO: 6.7 % (ref 5–12)
NEUTROPHILS NFR BLD AUTO: 4.91 10*3/MM3 (ref 1.7–7)
NEUTROPHILS NFR BLD AUTO: 68.9 % (ref 42.7–76)
NRBC BLD AUTO-RTO: 0 /100 WBC (ref 0–0.2)
PLATELET # BLD AUTO: 177 10*3/MM3 (ref 140–450)
PMV BLD AUTO: 9.6 FL (ref 6–12)
POTASSIUM SERPL-SCNC: 4.4 MMOL/L (ref 3.5–5.2)
PROT SERPL-MCNC: 6.9 G/DL (ref 6–8.5)
RBC # BLD AUTO: 5.25 10*6/MM3 (ref 3.77–5.28)
SODIUM SERPL-SCNC: 138 MMOL/L (ref 136–145)
WBC NRBC COR # BLD AUTO: 7.13 10*3/MM3 (ref 3.4–10.8)

## 2025-07-28 PROCEDURE — 99214 OFFICE O/P EST MOD 30 MIN: CPT | Performed by: NURSE PRACTITIONER

## 2025-07-28 PROCEDURE — 85025 COMPLETE CBC W/AUTO DIFF WBC: CPT | Performed by: NURSE PRACTITIONER

## 2025-07-28 PROCEDURE — 36415 COLL VENOUS BLD VENIPUNCTURE: CPT | Performed by: NURSE PRACTITIONER

## 2025-07-28 PROCEDURE — 80053 COMPREHEN METABOLIC PANEL: CPT | Performed by: NURSE PRACTITIONER

## 2025-07-28 RX ORDER — MINOCYCLINE HYDROCHLORIDE 100 MG/1
CAPSULE ORAL
COMMUNITY
Start: 2025-07-24

## 2025-07-28 NOTE — PROGRESS NOTES
[facility] [place]  Office number [phone]    Fax number [phone]    Follow up     Date: 25    Patient Name: Christine Black  MRN: [id number]  : 1990    Primary care: Erika Curiel NP    Chief Complaint: VTE, polycythemia    History of Present Illness: Christine Black is a pleasant 35 y.o. female who presents today for evaluation of VTE.    In 2020 she presented with buttock pain and shoulder pain and was diagnosed with a DVT and PE.  She underwent thrombectomy with stent placement.  She had been recently initiated on OCPs in January of that year.  She had no preceding history of high risk travel or surgery.  She was initiated on Xarelto which she continues with good tolerance.  Her menses were heavy with a ParaGard, but are light since she had a Mirena placed in 2023.    She had a limited hypercoagulable panel obtained at the time of her presentationThis was notable for Antithrombin 3 levels which were normal; normal homocystine levels.  Protein C&S levels were normal.  Factor V Leiden gene mutation was negative.    Recently she was noted to have polycythemia with a hemoglobin of 18.  Notably, she had only had coffee on the morning of her blood draw.  Review of serial CBCs dating back to  show high normal hemoglobin on prior checks.    Notably she  has a family history of a provoked blood clot in her father at the age of 30 following a hip fracture with repair.  He also experienced a blood clot in his 60s with travel.  She also has a family history of early onset breast cancer in her paternal aunt at age 49.  No prior cancer genetic testing that she is aware of.    She provides additional family history that her dad was told he was polycythemic but this was ultimately determined to be due to living in [place].  He has not required phlebotomy.  Her grandmother, mother, and sister all have elevated liver function tests.  She is not aware of any specific  diagnosis.    Bone marrow biopsy from 12/20/2023 shows normocellular bone marrow for age with adequate trilineage hematopoiesis and no evidence of dysplasia, fibrosis, increased blasts, or and lymphoma.  Cytogenetics negative.    She has h/o mechanical thrombectomy with subsequent angioplasty and then stenting of the right iliac veins as well as the common femoral vein 3/11/2020. Dr. Yan (now retired)    Interval history:  She is here for follow-up.  She had had some worsening varicosities with mild discomfort in the right lower extremity which is the site of her prior DVT.  She did have some swelling after a long flight from [place] but that resolved.  No calf pain or persistent swelling.  She wear compression garments for long car rides as well as air travel.  No bleeding symptoms.  Migraines have been manageable.  Continues on Xarelto.  No abnormal bleeding.  No CP, SOA.  No heavy menses is on Mirena.  She is tentatively planning to try to get pregnant.  She has not decided fully yet.    Past Medical History:   Past Medical History:   Diagnosis Date    Clotting disorder 3/7/2020    Blood clot in leg and lung. Removal of blood clot in leg    Cluster headache     Deep vein thrombosis 3/7/2020    Family history of blood clots     Headache, tension-type     History of blood clots     Migraine     Near syncope 7/24/2024    Ovarian cyst     Pulmonary embolism 03/07/2020    Blood clots in right leg and lungs    Vision loss     When migraines hit       Past Surgical History:   Past Surgical History:   Procedure Laterality Date    CARDIAC CATHETERIZATION Right 03/09/2020    Procedure: Peripheral angiography;  Surgeon: Margraito Yan MD;  Location: [facility] CATH INVASIVE LOCATION;  Service: Cardiovascular;  Laterality: Right;  Prone/ R. Popliteal Access    OTHER SURGICAL HISTORY      Blood clot removal- Right Leg      VASCULAR SURGERY      Blood clot removal    VEIN SURGERY  3/2020    Placement of stent  "in leg   Has right iliac/femoral stent 2020    Family History:   Family History   Problem Relation Age of Onset    Breast cancer Paternal Aunt     Breast cancer Paternal Aunt        Social History:   Social History     Socioeconomic History    Marital status: Single   Tobacco Use    Smoking status: Never    Smokeless tobacco: Never   Vaping Use    Vaping status: Never Used   Substance and Sexual Activity    Alcohol use: Yes     Alcohol/week: 1.0 standard drink of alcohol     Types: 1 Glasses of wine per week    Drug use: Never    Sexual activity: Yes     Partners: Male     Birth control/protection: I.U.D.       Medications:     Current Outpatient Medications:     Levonorgestrel (MIRENA, 52 MG, IU), by Intrauterine route., Disp: , Rfl:     minocycline (MINOCIN,DYNACIN) 100 MG capsule, , Disp: , Rfl:     rimegepant sulfate ODT (Nurtec) 75 MG disintegrating tablet, Place 1 tablet under the tongue Daily As Needed (migraine)., Disp: 8 tablet, Rfl: 11    rivaroxaban (Xarelto) 20 MG tablet, Take 1 tablet by mouth Daily., Disp: 90 tablet, Rfl: 3    Winlevi 1 % cream, , Disp: , Rfl:     Allergies:   No Known Allergies    Objective     Vital Signs:   Vitals:    07/28/25 1131   BP: 123/65   Pulse: 78   Resp: 16   Temp: 98.4 °F (36.9 °C)   SpO2: 99%   Weight: 76.7 kg (169 lb)   Height: 170.2 cm (67.01\")   PainSc: 0-No pain    Body mass index is 26.46 kg/m².   Pain Score    07/28/25 1131   PainSc: 0-No pain       Physical Exam:   General: No acute distress. Well appearing   HEENT: Normocephalic, atraumatic. Sclera anicteric.   Neck: supple, no adenopathy.   Cardiovascular: regular rate and rhythm. No murmurs.   Respiratory: Normal rate. Clear to auscultation bilaterally.  Abdomen: Soft, nontender, non distended with normoactive bowel sounds.   Lymph: no cervical, supraclavicular or axillary adenopathy.  Neuro: Alert and oriented x 3. No focal deficits.   Ext: Symmetric, 0-trace swelling on right. Circumference similar bilateral " calves. She is in tights so skin was obscured   Accurate 1/27/25    Laboratory/Imaging Reviewed:                 Assessment / Plan      Assessment/Plan:     1.  Pulmonary embolism, provoked in the context of oral contraceptives  2.  Family history of recurrent VTE in her father  -She presented with a provoked VTE event in the context of oral contraceptive use.  Workup for primary causes of thrombophilia included negative factor V Leiden mutation, normal levels of protein C and protein S.  Prothrombin gene mutation was negative.  Antithrombin levels were normal.  Anticardiolipin antibodies and beta-2 glycoprotein antibodies are negative.  Despite no evidence of thrombophilia, she does have persistent polycythemia which may increase her risk for further thrombosis.  -Because of her family history she has been continued on indefinite anticoagulation prior to consultation.  Despite no evidence of thrombophilia and unprovoked cause, I recommended continuation of anticoagulation at least until etiology of her polycythemia can be determined. She has now undergone extensive workup as below and appears to have a high affinity hemoglobin as the cause of her polycythemia.  We discussed that management of high affinity hemoglobin as it relates to primary prevention of thrombosis is not clear but given that she has a history of VTE associated with polycythemia, I would err on the side of continuing indefinite anticoagulation.  She is in agreement with this plan.  -Continue Xarelto daily.  -Check labs today:   Orders Placed This Encounter   Procedures    Comprehensive Metabolic Panel    CBC & Differential   -I asked her to contact us if she becomes pregnant for further instructions on her anticoagulation.    3.  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    -Negative JAK2, VANESSA-R, MPL, and exon 12 mutations.    -Erythropoietin is normal at 10.7.  -BCR-ABL was ordered but not  collected but this is unlikely to be of high yield in context of negative bone marrow biopsy.  -She has no history of cardiopulmonary disease.  -Bone marrow biopsy is normal and does not explain her persistent polycythemia.  -P50 testing performed at [facility] January 2024 notable for a low P50 of 17 (LLN 22) which is consistent with increased oxygen affinity of hemoglobin.   -We reviewed her [facility] laboratories testing which is negative for VHL related erythrocytosis and related conditions.  We discussed referral to genetic counseling or an academic center.  However there is an autosomal dominant pattern, and reduced P50 are highly suggestive of high affinity hemoglobin and I would recommend managing her as such.  -CBC pending    4. Abnormal LFTs  -Liver US was normal in 2022. Labs show normal iron saturation/ferritin/no evidence for iron overload.  -CMP shows improvement in AST which still remains slightly above the normal range in January 2024  -CMP pending    6.  Presence of vascular stents  7.  Worsening Varicose veins  -She will remain on chronic anticoagulation.   -Repeat duplex, but symptoms are not suggestive of acute DVT  -Referral to vascular surgery given worsening venous insufficiency symptoms, presence of vascular stent. Her prior vascular surgeon retired.   -Recommend compression socks.  She uses as needed.  She has follow-up with vascular surgery in 1 month for repeat duplex.    Follow Up:   6 mo     MAVERICK Srinivasan    Hematology and Oncology

## 2025-08-26 ENCOUNTER — LAB (OUTPATIENT)
Dept: LAB | Facility: HOSPITAL | Age: 35
End: 2025-08-26
Payer: COMMERCIAL

## 2025-08-26 ENCOUNTER — TRANSCRIBE ORDERS (OUTPATIENT)
Dept: LAB | Facility: HOSPITAL | Age: 35
End: 2025-08-26
Payer: COMMERCIAL

## 2025-08-26 DIAGNOSIS — R60.0 LOWER EXTREMITY EDEMA: ICD-10-CM

## 2025-08-26 DIAGNOSIS — I87.1 VEIN COMPRESSION: ICD-10-CM

## 2025-08-26 DIAGNOSIS — Z86.718 HISTORY OF DVT (DEEP VEIN THROMBOSIS): ICD-10-CM

## 2025-08-26 DIAGNOSIS — I87.2 CHRONIC VENOUS INSUFFICIENCY: Primary | ICD-10-CM

## 2025-08-26 DIAGNOSIS — I87.2 CHRONIC VENOUS INSUFFICIENCY: ICD-10-CM

## 2025-08-26 LAB
ANION GAP SERPL CALCULATED.3IONS-SCNC: 13.1 MMOL/L (ref 5–15)
BASOPHILS # BLD AUTO: 0.04 10*3/MM3 (ref 0–0.2)
BASOPHILS NFR BLD AUTO: 0.7 % (ref 0–1.5)
BUN SERPL-MCNC: 22 MG/DL (ref 6–20)
BUN/CREAT SERPL: 22 (ref 7–25)
CALCIUM SPEC-SCNC: 9.4 MG/DL (ref 8.6–10.5)
CHLORIDE SERPL-SCNC: 103 MMOL/L (ref 98–107)
CO2 SERPL-SCNC: 22.9 MMOL/L (ref 22–29)
CREAT SERPL-MCNC: 1 MG/DL (ref 0.57–1)
DEPRECATED RDW RBC AUTO: 44.5 FL (ref 37–54)
EGFRCR SERPLBLD CKD-EPI 2021: 76.4 ML/MIN/1.73
EOSINOPHIL # BLD AUTO: 0.03 10*3/MM3 (ref 0–0.4)
EOSINOPHIL NFR BLD AUTO: 0.5 % (ref 0.3–6.2)
ERYTHROCYTE [DISTWIDTH] IN BLOOD BY AUTOMATED COUNT: 12.6 % (ref 12.3–15.4)
GLUCOSE SERPL-MCNC: 69 MG/DL (ref 65–99)
HCT VFR BLD AUTO: 50.9 % (ref 34–46.6)
HGB BLD-MCNC: 17 G/DL (ref 12–15.9)
IMM GRANULOCYTES # BLD AUTO: 0.03 10*3/MM3 (ref 0–0.05)
IMM GRANULOCYTES NFR BLD AUTO: 0.5 % (ref 0–0.5)
LYMPHOCYTES # BLD AUTO: 1.35 10*3/MM3 (ref 0.7–3.1)
LYMPHOCYTES NFR BLD AUTO: 22.5 % (ref 19.6–45.3)
MCH RBC QN AUTO: 32.4 PG (ref 26.6–33)
MCHC RBC AUTO-ENTMCNC: 33.4 G/DL (ref 31.5–35.7)
MCV RBC AUTO: 97 FL (ref 79–97)
MONOCYTES # BLD AUTO: 0.54 10*3/MM3 (ref 0.1–0.9)
MONOCYTES NFR BLD AUTO: 9 % (ref 5–12)
NEUTROPHILS NFR BLD AUTO: 4 10*3/MM3 (ref 1.7–7)
NEUTROPHILS NFR BLD AUTO: 66.8 % (ref 42.7–76)
NRBC BLD AUTO-RTO: 0 /100 WBC (ref 0–0.2)
PLATELET # BLD AUTO: 178 10*3/MM3 (ref 140–450)
PMV BLD AUTO: 10.3 FL (ref 6–12)
POTASSIUM SERPL-SCNC: 4.3 MMOL/L (ref 3.5–5.2)
RBC # BLD AUTO: 5.25 10*6/MM3 (ref 3.77–5.28)
SODIUM SERPL-SCNC: 139 MMOL/L (ref 136–145)
WBC NRBC COR # BLD AUTO: 5.99 10*3/MM3 (ref 3.4–10.8)

## 2025-08-26 PROCEDURE — 80048 BASIC METABOLIC PNL TOTAL CA: CPT

## 2025-08-26 PROCEDURE — 85025 COMPLETE CBC W/AUTO DIFF WBC: CPT

## 2025-08-26 PROCEDURE — 36415 COLL VENOUS BLD VENIPUNCTURE: CPT

## (undated) DEVICE — NDL ART WING 18G 7CM

## (undated) DEVICE — RADIFOCUS GLIDEWIRE ADVANTAGE GUIDEWIRE: Brand: GLIDEWIRE ADVANTAGE

## (undated) DEVICE — QUICK-CROSS™ SUPPORT CATHETER: Brand: QUICK-CROSS™

## (undated) DEVICE — CATH F6 ST JR 4 100CM: Brand: SUPERTORQUE

## (undated) DEVICE — CAP FLOW TRIEVER INARI MEDICAL

## (undated) DEVICE — CATH IMG IVUS VISIONS .035 DIG 8.2F

## (undated) DEVICE — CLOTTRIEVER SHEATH, 13 FR, 15 CM: Brand: CLOTTRIEVER SHEATH, 13 FR

## (undated) DEVICE — MYNXGRIP 6F/7F: Brand: MYNXGRIP

## (undated) DEVICE — INFLATION DEVICE KIT: Brand: ENCORE™ 26 ADVANTAGE KIT

## (undated) DEVICE — PINNACLE INTRODUCER SHEATH: Brand: PINNACLE

## (undated) DEVICE — CLOTTRIEVER CATHETER, 16 MM, 105 CM: Brand: CLOTTRIEVER CATHETER, 16 MM

## (undated) DEVICE — ATLAS®  PTA BALLOON DILATATION CATHETER 16 MM X 40 MM, 75 CM CATHETER: Brand: ATLAS®